# Patient Record
Sex: MALE | Race: BLACK OR AFRICAN AMERICAN | NOT HISPANIC OR LATINO | Employment: UNEMPLOYED | URBAN - METROPOLITAN AREA
[De-identification: names, ages, dates, MRNs, and addresses within clinical notes are randomized per-mention and may not be internally consistent; named-entity substitution may affect disease eponyms.]

---

## 2023-10-31 PROBLEM — R14.0 ABDOMINAL BLOATING: Status: ACTIVE | Noted: 2023-10-31

## 2023-10-31 PROBLEM — N52.9 MALE ERECTILE DISORDER OF ORGANIC ORIGIN: Status: ACTIVE | Noted: 2023-10-31

## 2023-11-01 ENCOUNTER — CLINICAL SUPPORT (OUTPATIENT)
Dept: ORTHOPEDIC SURGERY | Facility: CLINIC | Age: 53
End: 2023-11-01
Payer: COMMERCIAL

## 2023-11-01 ENCOUNTER — OFFICE VISIT (OUTPATIENT)
Dept: ORTHOPEDIC SURGERY | Facility: CLINIC | Age: 53
End: 2023-11-01
Payer: COMMERCIAL

## 2023-11-01 VITALS — BODY MASS INDEX: 34.16 KG/M2 | WEIGHT: 205 LBS | HEIGHT: 65 IN

## 2023-11-01 DIAGNOSIS — M25.552 BILATERAL HIP PAIN: ICD-10-CM

## 2023-11-01 DIAGNOSIS — M16.0 PRIMARY OSTEOARTHRITIS OF BOTH HIPS: Primary | ICD-10-CM

## 2023-11-01 DIAGNOSIS — M25.551 BILATERAL HIP PAIN: ICD-10-CM

## 2023-11-01 PROCEDURE — 73521 X-RAY EXAM HIPS BI 2 VIEWS: CPT | Mod: BILATERAL PROCEDURE | Performed by: STUDENT IN AN ORGANIZED HEALTH CARE EDUCATION/TRAINING PROGRAM

## 2023-11-01 PROCEDURE — 99204 OFFICE O/P NEW MOD 45 MIN: CPT | Performed by: STUDENT IN AN ORGANIZED HEALTH CARE EDUCATION/TRAINING PROGRAM

## 2023-11-01 RX ORDER — NAPROXEN 500 MG/1
500 TABLET ORAL 2 TIMES DAILY
Qty: 60 TABLET | Refills: 1 | Status: SHIPPED | OUTPATIENT
Start: 2023-11-01 | End: 2023-12-31

## 2023-11-01 ASSESSMENT — PAIN SCALES - GENERAL: PAINLEVEL_OUTOF10: 7

## 2023-11-01 ASSESSMENT — PAIN - FUNCTIONAL ASSESSMENT: PAIN_FUNCTIONAL_ASSESSMENT: 0-10

## 2023-11-01 NOTE — PROGRESS NOTES
History of Present Illness  Chief Complaint   Patient presents with    Right Hip - New Patient Visit     1. Bilateral Hip pain x 3 months   DOI- Fell in bather tub about a year ago   X-Rays today        Patient presents with side: bilateral hip pain for one year.  It has been worsening over the past  6 months.  The patient localizes the pain predominantly in the groin.  Recently there has been concern for falls and instability.  There is increasing difficulty with activities of daily living and significant disability related to the hip pain. He denies any past treatment methods for this.   Pain is described as aching.  Better with rest, worse with activity.   Numbness or tingling radiating to the lower extremities: No    History reviewed. No pertinent past medical history.    Medication Documentation Review Audit       Reviewed by Teresa Mcarthur MA (Medical Assistant) on 11/01/23 at 1429      Medication Order Taking? Sig Documenting Provider Last Dose Status            No Medications to Display                                   No Known Allergies    History reviewed. No pertinent surgical history.    No images are attached to the encounter.       Review of Systems   GENERAL: Negative for malaise, significant weight loss, fever  MUSCULOSKELETAL: see HPI  NEURO:  Negative     Exam  side: bilateral Hip:  Antalgic gait  Negative Trendelenburg sign  Skin healthy and intact  No tenderness to palpation over lumbar spine  Minimal tenderness over greater trochanter  Range of motion:  Flexion: 110 internal rotation: 40 external rotation: 30   Pain with: activity, climbing stairs, walking, and putting on shoes and socks  No weakness with resisted hip flexion, abduction or adduction  Negative straight leg raise  Neurovascular exam normal distally     Radiographs  XR hips bilateral 2 VW w or wo pelvis    Result Date: 11/1/2023  Interpreted By:  Vaughn Adler III, STUDY: XR HIPS BILATERAL 2 VW WITH OR WITHOUT PELVIS; ;   11/1/2023 2:44 pm   INDICATION: Signs/Symptoms:pain.   COMPARISON: None.   ACCESSION NUMBER(S): [id number]   ORDERING CLINICIAN: JAKE GRIGGS   FINDINGS: Bilateral hip x-rays: Severe bilateral joint space narrowing about the hip articulations there is osteophyte formation sclerosis subchondral cystic change consistent with severe hip arthritis.       Bilateral severe hip arthritis     MACRO: None   Signed by: Jake Griggs III 11/1/2023 3:10 PM Dictation workstation:   [id number]            Assessment  52-year-old male with severe Osteoarthrosis side: bilateral hip     Plan  We discussed with the patient the diagnosis of severe degenerative joint disease of the hip.  We reviewed an evidence-based approach to osteoarthritis of the hip.  We strongly encouraged low-impact aerobic activity and non-opioid analgesics.  We discussed the role of physical therapy to aid in strengthening and gait training.  We discussed temporary pain relief with corticosteroid injections and the associated risks.      The patient elected for oral Naproxen therapy    We discussed the use of nonsteroidal anti-inflammatory drugs as part of a treatment plan. We discussed that these may result in GI upset and/or bleeding. Any stomach pain or dark hard stools would be reason to discontinue use. We discussed that when used over the long-term these medications can result in altered renal or hepatic function, and that routine use beyond 3 months requires follow-up with their primary provider for monitoring.  They expressed their understanding and agree with the plan.        In a face to face encounter, I evaluated the patient and performed a physical examination, discussed pertinent diagnostic studies if indicated and discussed diagnosis and management strategies with both the patient and physician assistant / nurse practitioner.  I reviewed the PA/NP's note and agree with the documented findings and plan of care.     In summary 52-year-old male  with bilateral severe hip arthritis.  He has not attempted any forms of conservative treatment at this point time.  We will proceed with oral anti-inflammatory for the next 6 weeks and see if this provides him any relief.  Discussed that ultimate treatment would be a total hip replacement.  At next visit we will further discuss injection therapy versus hip replacement.  He was provided with a handout regarding a total hip replacement.    Vaughn Adler III, MD

## 2023-12-15 ENCOUNTER — OFFICE VISIT (OUTPATIENT)
Dept: ORTHOPEDIC SURGERY | Facility: CLINIC | Age: 53
End: 2023-12-15
Payer: COMMERCIAL

## 2023-12-15 DIAGNOSIS — M25.552 BILATERAL HIP PAIN: Primary | ICD-10-CM

## 2023-12-15 DIAGNOSIS — M25.551 BILATERAL HIP PAIN: Primary | ICD-10-CM

## 2023-12-15 PROCEDURE — 99213 OFFICE O/P EST LOW 20 MIN: CPT | Performed by: STUDENT IN AN ORGANIZED HEALTH CARE EDUCATION/TRAINING PROGRAM

## 2023-12-15 NOTE — PROGRESS NOTES
History of Present Illness  Patient presents with bilateral groin pain for several years.  The patient localizes the pain predominantly anterior.  Recently there has been concern for falls and instability.  There is increasing difficulty with activities of daily living and significant disability related to the hip pain.  The patient endorses the following failed non-operative treatments: Naproxen.   There is increasing frustration with persistent pain and discomfor and decreasing distance of ambulation.  Pain is moderate, achy, diffuse.  Better with rest, worse with activity.      Naproxen provided him short course of relief.  Of note though he does have upcoming dental surgery to remove some rotten teeth.  This is scheduled for 1/11/2024 patient is tired with the way he is feeling wishes to proceed likely with total hip r arthroplasty sometime this spring     Review of Systems   GENERAL: Negative for malaise, significant weight loss, fever  MUSCULOSKELETAL: see HPI  NEURO:  Negative     Exam  Bilateral hip:  Antalgic gait  Negative Trendelenburg sign  Skin healthy and intact  No tenderness to palpation over lumbar spine  Minimal tenderness over greater trochanter     Mild pain with extreme of forward flexion   Rotation: Moderate discomfort  No weakness with resisted hip flexion, abduction or adduction     Positive flexion/adduction/internal rotation  Negative flexion/abduction/external rotation test  Negative straight leg raise  Neurovascular exam normal distally     Radiographs  No results found.       Assessment  Patient with severe bilateral hip osteoarthritis     Plan  We discussed with the patient the diagnosis of severe bilateral degenerative joint disease of the hip.  We reviewed an evidence-based approach to osteoarthritis of the hip.  We strongly encouraged low-impact aerobic activity and non-opioid analgesics.     We discussed temporary pain relief with corticosteroid injections and the associated  risks.      The patient elected for continue use of naproxen will give him morphine refill for this today  Plan at this point in time is likely to proceed with total hip arthroplasty sometime this spring.  Discussed with patient that we need to have this dental work done and completely resolved prior to discussion of any type of total hip replacement.  Patient will return later January after this was addressed..

## 2024-01-24 ENCOUNTER — TELEPHONE (OUTPATIENT)
Dept: ORTHOPEDIC SURGERY | Facility: CLINIC | Age: 54
End: 2024-01-24
Payer: COMMERCIAL

## 2024-01-24 DIAGNOSIS — M16.0 PRIMARY OSTEOARTHRITIS OF BOTH HIPS: Primary | ICD-10-CM

## 2024-01-24 RX ORDER — NAPROXEN 500 MG/1
500 TABLET ORAL 2 TIMES DAILY
Qty: 60 TABLET | Refills: 1 | Status: SHIPPED | OUTPATIENT
Start: 2024-01-24 | End: 2024-03-29 | Stop reason: HOSPADM

## 2024-01-31 ENCOUNTER — OFFICE VISIT (OUTPATIENT)
Dept: ORTHOPEDIC SURGERY | Facility: CLINIC | Age: 54
End: 2024-01-31
Payer: COMMERCIAL

## 2024-01-31 VITALS — HEIGHT: 65 IN | BODY MASS INDEX: 33.32 KG/M2 | WEIGHT: 200 LBS

## 2024-01-31 DIAGNOSIS — M16.11 PRIMARY OSTEOARTHRITIS OF RIGHT HIP: Primary | ICD-10-CM

## 2024-01-31 DIAGNOSIS — M25.551 BILATERAL HIP PAIN: ICD-10-CM

## 2024-01-31 DIAGNOSIS — M25.552 BILATERAL HIP PAIN: ICD-10-CM

## 2024-01-31 PROCEDURE — 99214 OFFICE O/P EST MOD 30 MIN: CPT | Performed by: STUDENT IN AN ORGANIZED HEALTH CARE EDUCATION/TRAINING PROGRAM

## 2024-01-31 PROCEDURE — 4004F PT TOBACCO SCREEN RCVD TLK: CPT | Performed by: STUDENT IN AN ORGANIZED HEALTH CARE EDUCATION/TRAINING PROGRAM

## 2024-01-31 PROCEDURE — E0143 WALKER FOLDING WHEELED W/O S: HCPCS | Performed by: STUDENT IN AN ORGANIZED HEALTH CARE EDUCATION/TRAINING PROGRAM

## 2024-01-31 ASSESSMENT — PAIN SCALES - GENERAL: PAINLEVEL_OUTOF10: 8

## 2024-01-31 ASSESSMENT — PAIN - FUNCTIONAL ASSESSMENT: PAIN_FUNCTIONAL_ASSESSMENT: 0-10

## 2024-02-01 NOTE — PROGRESS NOTES
Chief Complaint   Patient presents with    Left Hip - Pain     B/L HIP OA  X-RAY TODAY    Right Hip - Follow-up, Pain       The patient is here for follow-up of their hip arthritis.  They complain of severe hip pain.  Thus far the patient has tried Rest, ice, elevation, Tylenol, NSAIDS, Injections, and physician directed exercises, exercise modifications .  The patient denies any recent trauma.  The patient denies any back pain, numbness or tingling in the lower extremity.  States that he has debilitating pain about the hip right.    History reviewed. No pertinent past medical history.    Medication Documentation Review Audit       Reviewed by Teresa Mcarthur MA (Medical Assistant) on 01/31/24 at 1532      Medication Order Taking? Sig Documenting Provider Last Dose Status   naproxen (Naprosyn) 500 mg tablet [id number]  Take 1 tablet (500 mg) by mouth 2 times a day. Vaughn Adler MD  Active                    No Known Allergies    Social History     Socioeconomic History    Marital status:      Spouse name: Not on file    Number of children: Not on file    Years of education: Not on file    Highest education level: Not on file   Occupational History    Not on file   Tobacco Use    Smoking status: Every Day     Packs/day: 1     Types: Cigarettes    Smokeless tobacco: Never   Substance and Sexual Activity    Alcohol use: Not on file    Drug use: Not on file    Sexual activity: Not on file   Other Topics Concern    Not on file   Social History Narrative    Not on file     Social Determinants of Health     Financial Resource Strain: Not on file   Food Insecurity: Not on file   Transportation Needs: Not on file   Physical Activity: Not on file   Stress: Not on file   Social Connections: Not on file   Intimate Partner Violence: Not on file   Housing Stability: Not on file       History reviewed. No pertinent surgical history.    Body mass index is 33.28 kg/m².    Physical examination  Right hip:  Antalgic  gait  Negative Trendelenburg sign  Skin healthy and intact  No tenderness to palpation over lumbar spine  Minimal tenderness over greater trochanter     Mild pain with extreme of forward flexion   Rotation: Pain with internal and external rotation  No weakness with resisted hip flexion, abduction or adduction     Positive flexion/adduction/internal rotation  Negative flexion/abduction/external rotation test  Negative straight leg raise  Neurovascular exam normal distally    Imaging:  XR hips bilateral 2 VW w or wo pelvis  Narrative: Interpreted By:  Jake Griggs III,   STUDY:  XR HIPS BILATERAL 2 VW WITH OR WITHOUT PELVIS; ;  11/1/2023 2:44 pm      INDICATION:  Signs/Symptoms:pain.      COMPARISON:  None.      ACCESSION NUMBER(S):  [id number]      ORDERING CLINICIAN:  JAKE GRIGGS      FINDINGS:  Bilateral hip x-rays: Severe bilateral joint space narrowing about  the hip articulations there is osteophyte formation sclerosis  subchondral cystic change consistent with severe hip arthritis.      Impression: Bilateral severe hip arthritis          MACRO:  None      Signed by: Jake Griggs III 11/1/2023 3:10 PM  Dictation workstation:   [id number]      Impression:  3-year-old male with bilateral hip arthritis right greater than left    Plan:  X-ray findings again discussed with patient in detail  Discussed importance of low impact aerobic type exercise  Discussed importance of nutrition and healthy diet to help offload the hip joint  Discussed activities to avoid  Patient wishes to proceed with total hip arthroplasty    History and physical exam as well as imaging findings discussed with patient in detail.  Patient has longstanding hip pain that has failed to improve with conservative treatment to include injections, activity modifications and anti-inflammatories.  Risk benefits and alternatives of treatment were discussed with the patient in detail.  Using shared informed decision making patient wishes to  proceed with surgical intervention to include right total hip arthroplasty with Dell assist.  Risks of the surgery which include but are not limited to infection, need for revision surgery, neurovascular compromise, instability, infection, periprosthetic fracture, continued pain and stiffness were discussed with this patient in detail.  They do wish to proceed.      We will obtain medical clearance prior to proceeding.  We will also obtain a CT scan for preoperative planning.  Patient will return to clinic after these are completed for presurgical visit and further discussion of details regarding surgery and day of surgery.    I have personally reviewed the OARRS report for this patient. This report is scanned into the electronic medical record. I have considered the risks of abuse, dependence, addiction, and diversion. They currently report a pain of 8.     The risks of surgery were discussed including but not limited to the risks of medications given for surgery, the risk of blood loss during and after surgery that can lead to the need for blood products in certain situations, infection, damage to normal structures that can lead to long term problems of pain or dysfunction, wound healing complications, the possibility of nonunion/malunion of any osteotomies and late or chronic pain as a result of the surgical intervention.  In addition potentially life threatening complications that can occur at the time of surgery and after surgery were discussed including but not limited to deep vein thrombosis, pulmonary embolism, myocardial infarction, stroke and death.

## 2024-02-28 ENCOUNTER — TELEPHONE (OUTPATIENT)
Dept: ORTHOPEDIC SURGERY | Facility: CLINIC | Age: 54
End: 2024-02-28
Payer: COMMERCIAL

## 2024-02-28 DIAGNOSIS — M16.0 PRIMARY OSTEOARTHRITIS OF BOTH HIPS: ICD-10-CM

## 2024-02-28 NOTE — TELEPHONE ENCOUNTER
Spoke with Dr. Adler, he said we can send in a steroid back, but he hold off on any narcotic medications for after surgery.  He also said we can give arlin  placard for 3 months.    Sent [employer] message to patient notifying of this.

## 2024-03-01 ENCOUNTER — TELEPHONE (OUTPATIENT)
Dept: ORTHOPEDIC SURGERY | Facility: CLINIC | Age: 54
End: 2024-03-01
Payer: COMMERCIAL

## 2024-03-01 PROBLEM — M16.11 UNILATERAL PRIMARY OSTEOARTHRITIS, RIGHT HIP: Status: ACTIVE | Noted: 2024-03-28

## 2024-03-01 RX ORDER — METHYLPREDNISOLONE 4 MG/1
TABLET ORAL
Qty: 21 TABLET | Refills: 0 | Status: SHIPPED | OUTPATIENT
Start: 2024-03-01 | End: 2024-03-14 | Stop reason: ENTERED-IN-ERROR

## 2024-03-01 NOTE — TELEPHONE ENCOUNTER
----- Message from Teresita Krause sent at 2/29/2024  6:06 AM EST -----  Regarding: RE: AXEL III surgery  Prior Authorization: APPROVED     Insurance Provider: [facility]     Test-Procedure-Medication-Equipment: CT TRISTAN RT HIP WO CONTRAST    Name of Insurance Personnel conferred with: ONLINE       Prior Authorization Number: [id number]        Expiration Date: 4/13/24     Comments: SCHEDULE PT AT [facility]    ----- Message -----  From: Laury Phillip  Sent: 2/28/2024  10:38 AM EST  To: Teresita Krause  Subject: AXEL III surgery                                   AXEL PT  CT SCAN   RIGHT HIP  SX 03/28/2024 AT [facility]

## 2024-03-04 ENCOUNTER — TRANSCRIBE ORDERS (OUTPATIENT)
Dept: ORTHOPEDIC SURGERY | Facility: CLINIC | Age: 54
End: 2024-03-04
Payer: COMMERCIAL

## 2024-03-04 ENCOUNTER — TELEPHONE (OUTPATIENT)
Dept: ORTHOPEDIC SURGERY | Facility: CLINIC | Age: 54
End: 2024-03-04
Payer: COMMERCIAL

## 2024-03-04 DIAGNOSIS — M16.11 PRIMARY OSTEOARTHRITIS OF RIGHT HIP: ICD-10-CM

## 2024-03-04 NOTE — TELEPHONE ENCOUNTER
CALLED AND MADE APPT FOR 03/14/2024 AT 8:15AM, CALLED PT AND MADE HIM AWARE OF THE APPT. SO THAT THIS GOES ALONG WITH PAT APPT     ----- Message from Teresita Krause sent at 2/29/2024  6:06 AM EST -----  Regarding: RE: AXEL III surgery  Prior Authorization: APPROVED     Insurance Provider: [facility]     Test-Procedure-Medication-Equipment: CT TRISTAN RT HIP WO CONTRAST    Name of Insurance Personnel conferred with: ONLINE       Prior Authorization Number: [id number]        Expiration Date: 4/13/24     Comments: SCHEDULE PT AT [facility]    ----- Message -----  From: Laury Phillip  Sent: 2/28/2024  10:38 AM EST  To: Teresita Krause  Subject: AXEL III surgery                                   AXEL PT  CT SCAN   RIGHT HIP  SX 03/28/2024 AT [facility]

## 2024-03-08 DIAGNOSIS — M16.0 PRIMARY OSTEOARTHRITIS OF BOTH HIPS: ICD-10-CM

## 2024-03-12 ENCOUNTER — OFFICE VISIT (OUTPATIENT)
Dept: PRIMARY CARE | Facility: CLINIC | Age: 54
End: 2024-03-12
Payer: COMMERCIAL

## 2024-03-12 VITALS
HEART RATE: 76 BPM | SYSTOLIC BLOOD PRESSURE: 134 MMHG | BODY MASS INDEX: 33.32 KG/M2 | TEMPERATURE: 98 F | HEIGHT: 65 IN | DIASTOLIC BLOOD PRESSURE: 80 MMHG | WEIGHT: 200 LBS

## 2024-03-12 DIAGNOSIS — F17.200 SMOKER: ICD-10-CM

## 2024-03-12 DIAGNOSIS — M16.11 UNILATERAL PRIMARY OSTEOARTHRITIS, RIGHT HIP: ICD-10-CM

## 2024-03-12 DIAGNOSIS — Z01.818 PREOP EXAMINATION: Primary | ICD-10-CM

## 2024-03-12 PROCEDURE — 99214 OFFICE O/P EST MOD 30 MIN: CPT | Performed by: INTERNAL MEDICINE

## 2024-03-12 ASSESSMENT — ENCOUNTER SYMPTOMS
CONSTITUTIONAL NEGATIVE: 1
HEMATOLOGIC/LYMPHATIC NEGATIVE: 1
EYES NEGATIVE: 1
PSYCHIATRIC NEGATIVE: 1
NEUROLOGICAL NEGATIVE: 1
RESPIRATORY NEGATIVE: 1
ALLERGIC/IMMUNOLOGIC NEGATIVE: 1
GASTROINTESTINAL NEGATIVE: 1
CARDIOVASCULAR NEGATIVE: 1

## 2024-03-12 NOTE — H&P (VIEW-ONLY)
Subjective   Patient ID: Farhat Julian is a 53 y.o. male who presents for No chief complaint on file..    HPI patient has not been here for a while now see here for preop clearance for his right hip he is limping and is in a lot of pain he never had any general anesthesia in the past he does not have diabetes CHF he does smoke half pack of cigarettes daily    Review of Systems   Constitutional: Negative.    HENT: Negative.     Eyes: Negative.    Respiratory: Negative.     Cardiovascular: Negative.    Gastrointestinal: Negative.    Genitourinary: Negative.    Musculoskeletal:         Rt hip arthritis and left hip arthritis   Skin: Negative.    Allergic/Immunologic: Negative.    Neurological: Negative.    Hematological: Negative.    Psychiatric/Behavioral: Negative.     All other systems reviewed and are negative.      Objective   There were no vitals taken for this visit.    Physical Exam  Vitals reviewed.   Constitutional:       Comments: Limping with the right leg and moderate pain   HENT:      Head: Normocephalic.      Nose: Nose normal.   Cardiovascular:      Rate and Rhythm: Normal rate and regular rhythm.   Pulmonary:      Effort: Pulmonary effort is normal.      Breath sounds: Normal breath sounds.   Musculoskeletal:      Right lower leg: No edema.      Left lower leg: No edema.      Comments: Limping with the right leg   Neurological:      General: No focal deficit present.      Mental Status: He is alert. Mental status is at baseline.   Psychiatric:         Mood and Affect: Mood normal.         Thought Content: Thought content normal.         Assessment/Plan   Problem List Items Addressed This Visit             ICD-10-CM    Unilateral primary osteoarthritis, right hip M16.11    Smoker F17.200     Other Visit Diagnoses         Codes    Preop examination    -  Primary Z01.818        Patient medically cleared for hip replacement to go for PAD on 14th.  Note regarding clearance will be shared with Dr. Adler  the requesting orthopedics via EMR.  Patient advised to quit smoking.  Also advised strongly to come back for wellness exam and advised about getting a screening colonoscopy done and LDCT for cancer screening.

## 2024-03-13 RX ORDER — ACETAMINOPHEN 500 MG
500 TABLET ORAL EVERY 6 HOURS PRN
COMMUNITY
End: 2024-03-14 | Stop reason: ENTERED-IN-ERROR

## 2024-03-13 ASSESSMENT — DUKE ACTIVITY SCORE INDEX (DASI)
CAN YOU DO MODERATE WORK AROUND THE HOUSE LIKE VACUUMING, SWEEPING FLOORS OR CARRYING GROCERIES: YES
CAN YOU WALK INDOORS, SUCH AS AROUND YOUR HOUSE: YES
CAN YOU PARTICIPATE IN MODERATE RECREATIONAL ACTIVITIES LIKE GOLF, BOWLING, DANCING, DOUBLES TENNIS OR THROWING A BASEBALL OR FOOTBALL: NO
CAN YOU DO HEAVY WORK AROUND THE HOUSE LIKE SCRUBBING FLOORS OR LIFTING AND MOVING HEAVY FURNITURE: NO
CAN YOU HAVE SEXUAL RELATIONS: YES
DASI METS SCORE: 5.7
CAN YOU PARTICIPATE IN STRENOUS SPORTS LIKE SWIMMING, SINGLES TENNIS, FOOTBALL, BASKETBALL, OR SKIING: NO
CAN YOU WALK A BLOCK OR TWO ON LEVEL GROUND: YES
CAN YOU RUN A SHORT DISTANCE: NO
TOTAL_SCORE: 24.2
CAN YOU CLIMB A FLIGHT OF STAIRS OR WALK UP A HILL: YES
CAN YOU DO LIGHT WORK AROUND THE HOUSE LIKE DUSTING OR WASHING DISHES: YES
CAN YOU DO YARD WORK LIKE RAKING LEAVES, WEEDING OR PUSHING A MOWER: NO
CAN YOU TAKE CARE OF YOURSELF (EAT, DRESS, BATHE, OR USE TOILET): YES

## 2024-03-13 ASSESSMENT — CHADS2 SCORE
PRIOR STROKE OR TIA OR THROMBOEMBOLISM: NO
CHF: NO
AGE GREATER THAN OR EQUAL TO 75: NO
DIABETES: NO
CHADS2 SCORE: 0
HYPERTENSION: NO

## 2024-03-13 ASSESSMENT — LIFESTYLE VARIABLES: SMOKING_STATUS: SMOKER

## 2024-03-13 ASSESSMENT — ACTIVITIES OF DAILY LIVING (ADL): ADL_SCORE: 1

## 2024-03-14 ENCOUNTER — PRE-ADMISSION TESTING (OUTPATIENT)
Dept: PREADMISSION TESTING | Facility: HOSPITAL | Age: 54
End: 2024-03-14
Payer: COMMERCIAL

## 2024-03-14 ENCOUNTER — APPOINTMENT (OUTPATIENT)
Dept: RADIOLOGY | Facility: HOSPITAL | Age: 54
End: 2024-03-14
Payer: COMMERCIAL

## 2024-03-14 ENCOUNTER — LAB (OUTPATIENT)
Dept: LAB | Facility: LAB | Age: 54
End: 2024-03-14
Payer: COMMERCIAL

## 2024-03-14 VITALS
WEIGHT: 198.85 LBS | BODY MASS INDEX: 33.13 KG/M2 | HEIGHT: 65 IN | OXYGEN SATURATION: 96 % | SYSTOLIC BLOOD PRESSURE: 173 MMHG | RESPIRATION RATE: 16 BRPM | DIASTOLIC BLOOD PRESSURE: 97 MMHG | HEART RATE: 85 BPM | TEMPERATURE: 98.2 F

## 2024-03-14 DIAGNOSIS — Z01.818 PRE-OP EXAMINATION: ICD-10-CM

## 2024-03-14 DIAGNOSIS — Z01.818 PRE-OP TESTING: Primary | ICD-10-CM

## 2024-03-14 DIAGNOSIS — Z01.818 PRE-OP TESTING: ICD-10-CM

## 2024-03-14 LAB
ABO GROUP (TYPE) IN BLOOD: NORMAL
ALBUMIN SERPL BCP-MCNC: 4.1 G/DL (ref 3.4–5)
ALP SERPL-CCNC: 82 U/L (ref 33–120)
ALT SERPL W P-5'-P-CCNC: 54 U/L (ref 10–52)
ANION GAP SERPL CALC-SCNC: 12 MMOL/L (ref 10–20)
ANTIBODY SCREEN: NORMAL
APTT PPP: 32 SECONDS (ref 27–38)
AST SERPL W P-5'-P-CCNC: 33 U/L (ref 9–39)
ATRIAL RATE: 79 BPM
BASOPHILS # BLD AUTO: 0.1 X10*3/UL (ref 0–0.1)
BASOPHILS NFR BLD AUTO: 1.3 %
BILIRUB SERPL-MCNC: 0.5 MG/DL (ref 0–1.2)
BUN SERPL-MCNC: 12 MG/DL (ref 6–23)
CALCIUM SERPL-MCNC: 9.3 MG/DL (ref 8.6–10.3)
CHLORIDE SERPL-SCNC: 108 MMOL/L (ref 98–107)
CO2 SERPL-SCNC: 26 MMOL/L (ref 21–32)
CREAT SERPL-MCNC: 0.85 MG/DL (ref 0.5–1.3)
EGFRCR SERPLBLD CKD-EPI 2021: >90 ML/MIN/1.73M*2
EOSINOPHIL # BLD AUTO: 0.38 X10*3/UL (ref 0–0.7)
EOSINOPHIL NFR BLD AUTO: 4.9 %
ERYTHROCYTE [DISTWIDTH] IN BLOOD BY AUTOMATED COUNT: 12 % (ref 11.5–14.5)
EST. AVERAGE GLUCOSE BLD GHB EST-MCNC: 111 MG/DL
GLUCOSE SERPL-MCNC: 114 MG/DL (ref 74–99)
HBA1C MFR BLD: 5.5 %
HCT VFR BLD AUTO: 38 % (ref 41–52)
HGB BLD-MCNC: 13.3 G/DL (ref 13.5–17.5)
IMM GRANULOCYTES # BLD AUTO: 0.02 X10*3/UL (ref 0–0.7)
IMM GRANULOCYTES NFR BLD AUTO: 0.3 % (ref 0–0.9)
INR PPP: 1 (ref 0.9–1.1)
LYMPHOCYTES # BLD AUTO: 1.74 X10*3/UL (ref 1.2–4.8)
LYMPHOCYTES NFR BLD AUTO: 22.4 %
MCH RBC QN AUTO: 32.4 PG (ref 26–34)
MCHC RBC AUTO-ENTMCNC: 35 G/DL (ref 32–36)
MCV RBC AUTO: 93 FL (ref 80–100)
MONOCYTES # BLD AUTO: 0.81 X10*3/UL (ref 0.1–1)
MONOCYTES NFR BLD AUTO: 10.4 %
NEUTROPHILS # BLD AUTO: 4.73 X10*3/UL (ref 1.2–7.7)
NEUTROPHILS NFR BLD AUTO: 60.7 %
NRBC BLD-RTO: 0 /100 WBCS (ref 0–0)
P AXIS: 41 DEGREES
P OFFSET: 199 MS
P ONSET: 142 MS
PLATELET # BLD AUTO: 311 X10*3/UL (ref 150–450)
POTASSIUM SERPL-SCNC: 4.1 MMOL/L (ref 3.5–5.3)
PR INTERVAL: 164 MS
PROT SERPL-MCNC: 7.3 G/DL (ref 6.4–8.2)
PROTHROMBIN TIME: 11.1 SECONDS (ref 9.8–12.8)
Q ONSET: 224 MS
QRS COUNT: 13 BEATS
QRS DURATION: 92 MS
QT INTERVAL: 380 MS
QTC CALCULATION(BAZETT): 435 MS
QTC FREDERICIA: 416 MS
R AXIS: 3 DEGREES
RBC # BLD AUTO: 4.1 X10*6/UL (ref 4.5–5.9)
RH FACTOR (ANTIGEN D): NORMAL
SODIUM SERPL-SCNC: 142 MMOL/L (ref 136–145)
T AXIS: 10 DEGREES
T OFFSET: 414 MS
VENTRICULAR RATE: 79 BPM
WBC # BLD AUTO: 7.8 X10*3/UL (ref 4.4–11.3)

## 2024-03-14 PROCEDURE — 80053 COMPREHEN METABOLIC PANEL: CPT

## 2024-03-14 PROCEDURE — 85610 PROTHROMBIN TIME: CPT

## 2024-03-14 PROCEDURE — 36415 COLL VENOUS BLD VENIPUNCTURE: CPT

## 2024-03-14 PROCEDURE — 85025 COMPLETE CBC W/AUTO DIFF WBC: CPT

## 2024-03-14 PROCEDURE — 87081 CULTURE SCREEN ONLY: CPT | Mod: STJLAB

## 2024-03-14 PROCEDURE — 85730 THROMBOPLASTIN TIME PARTIAL: CPT

## 2024-03-14 PROCEDURE — 83036 HEMOGLOBIN GLYCOSYLATED A1C: CPT

## 2024-03-14 PROCEDURE — 99204 OFFICE O/P NEW MOD 45 MIN: CPT | Performed by: NURSE PRACTITIONER

## 2024-03-14 PROCEDURE — 86901 BLOOD TYPING SEROLOGIC RH(D): CPT

## 2024-03-14 PROCEDURE — 86900 BLOOD TYPING SEROLOGIC ABO: CPT

## 2024-03-14 PROCEDURE — 86850 RBC ANTIBODY SCREEN: CPT

## 2024-03-14 PROCEDURE — 93005 ELECTROCARDIOGRAM TRACING: CPT

## 2024-03-14 RX ORDER — CHLORHEXIDINE GLUCONATE ORAL RINSE 1.2 MG/ML
SOLUTION DENTAL
Qty: 473 ML | Refills: 0 | Status: SHIPPED | OUTPATIENT
Start: 2024-03-14 | End: 2024-03-29 | Stop reason: HOSPADM

## 2024-03-14 RX ORDER — BISMUTH SUBSALICYLATE 262 MG
1 TABLET,CHEWABLE ORAL DAILY
COMMUNITY

## 2024-03-14 NOTE — PREPROCEDURE INSTRUCTIONS
Medication List            Accurate as of March 14, 2024 10:44 AM. Always use your most recent med list.                chlorhexidine 0.12 % solution  Commonly known as: Peridex  Swish and spit 15 ml for 2 doses, 15mL the night before surgery and 15 ml morning of surgery - swish for 30 seconds -DO NOT SWALLOW, SPIT OUT  Medication Adjustments for Surgery: Other (Comment)  Notes to patient: As indicated     [facility] ORAL  Medication Adjustments for Surgery: Stop 7 days before surgery     multivitamin tablet  Medication Adjustments for Surgery: Stop 7 days before surgery     naproxen 500 mg tablet  Commonly known as: Naprosyn  Take 1 tablet (500 mg) by mouth 2 times a day.  Medication Adjustments for Surgery: Stop 7 days before surgery                       PRE-OPERATIVE INSTRUCTIONS    You will receive notification one business day prior to your procedure to confirm your arrival time. It is important that you answer your phone and/or check your messages during this time. If you do not hear from the surgery center by 5 pm. the day before your procedure, please call [phone].     Please enter the building through the Outpatient entrance and take the elevator off the lobby to the 2nd floor then check in at the Outpatient Surgery desk on the 2nd floor.    INSTRUCTIONS:  Talk to your surgeon for instructions if you should stop your aspirin, blood thinner, or diabetes medicines.  DO NOT take any multivitamins or over the counter supplements for 7-10 days before surgery.  If not being admitted, you must have an adult immediately available to drive you home after surgery. We also highly recommend you have someone stay with you for the entire day and night of your surgery.  For children having surgery, a parent or legal guardian must accompany them to the surgery center. If this is not possible, please call [phone] to make additional arrangements.  For adults who are unable to consent or make  medical decisions for themselves, a legal guardian or Power of  must accompany them to the surgery center. If this is not possible, please call [phone] to make additional arrangements.  Wear comfortable, loose fitting clothing.  All jewelry and piercings must be removed. If you are unable to remove an item or have a dermal piercing, please be sure to tell the nurse when you arrive for surgery.  Nail polish and make-up must be removed.  Avoid smoking or consuming alcohol for 24 hours before surgery.  To help prevent infection, please take a shower/bath and wash your hair the night before and/or morning of surgery (or follow other specific bathing instructions provided).    Preoperative Fasting Guidelines    Why must I stop eating and drinking near surgery time?  With sedation, food or liquid in your stomach can enter your lungs causing serious complications  Increases nausea and vomiting    When do I need to stop eating and drinking before my surgery?  Do not eat any solid food after midnight the night before your surgery/procedure.  You may have up to TEN ounces of clear liquid until TWO hours before your instructed arrival time to the hospital.  This includes water, black tea/coffee, (no milk or cream) apple juice, and electrolyte drinks (Gatorade).   You may chew gum until TWO hours before your surgery/procedure    If you have any questions or concerns, please call Pre-Admission Testing at [phone].

## 2024-03-14 NOTE — CPM/PAT H&P
CPM/PAT Evaluation       Name: Farhat Julian (Farhat Julian)  /Age: 1970/53 y.o.     In-Person       Chief Complaint: right hip pains    HPI    PARADISE is a 54 yo male who has been having bilateral hip pains for few years now and right feels worse than left. Imaging has shown bilateral OA- subsequently scheduled for right THR first, left may follow in the future. Denies any recent steroids- hd did note take recent oral medrol dose pack. Skin intact to surgical limb. No recent falls either. Otherwise denies any recent illness, fever/chills, chest pains or shortness of breath.     Past Medical History:   Diagnosis Date    Arthritis     Dental disease     Joint pain     OA (osteoarthritis)     Smoker     Smoker      PCP: Dr. Miller (med clearance on 3/12/2024)    Past Surgical History:   Procedure Laterality Date    WISDOM TOOTH EXTRACTION         Patient  reports being sexually active.    Family History   Problem Relation Name Age of Onset    Heart failure Mother      Cancer Sibling         No Known Allergies    Prior to Admission medications    Medication Sig Start Date End Date Taking? Authorizing Provider   naproxen (Naprosyn) 500 mg tablet Take 1 tablet (500 mg) by mouth 2 times a day. 1/24/24 3/24/24 Yes Vaughn Adler MD   acetaminophen (Tylenol) 500 mg tablet Take 1 tablet (500 mg) by mouth every 6 hours if needed for mild pain (1 - 3).    Historical Provider, MD   chlorhexidine (Peridex) 0.12 % solution Swish and spit 15 ml for 2 doses, 15mL the night before surgery and 15 ml morning of surgery - swish for 30 seconds -DO NOT SWALLOW, SPIT OUT 3/14/24   SAKSHI Bermudez-CNP   methylPREDNISolone (Medrol Dospak) 4 mg tablets Use as directed by package instructions 3/1/24   Vaughn Adler MD        PAT ROS   Constitutional: Negative for fever, chills, or sweats     ENMT: Negative for nasal discharge, congestion, ear pain, mouth pain, throat pain; positive for recent gum laser procedure- denies  pain of drainage     Respiratory: Negative for cough, wheezing, shortness of breath   Cardiac: Negative for chest pain, dyspnea on exertion, palpitations   Gastrointestinal: Negative for nausea, vomiting, diarrhea, constipation, abdominal pain  Genitourinary: Negative for dysuria, flank pain, frequency, hematuria   Musculoskeletal: Positive for decreased ROM, pain, weakness in bilateral hips- soreness in right groin    Neurological: Negative for dizziness, confusion, headache  Psychiatric: Negative for mood changes   Skin: Negative for itching, rash, ulcer    Hematologic/Lymph: Negative for bruising, easy bleeding  Allergic/Immunologic: Negative itching, sneezing, swelling      Physical Exam  HENT:      Head: Normocephalic.      Mouth/Throat:      Mouth: Mucous membranes are moist.   Eyes:      Extraocular Movements: Extraocular movements intact.   Cardiovascular:      Rate and Rhythm: Normal rate and regular rhythm.   Pulmonary:      Effort: Pulmonary effort is normal.      Breath sounds: Normal breath sounds.   Abdominal:      General: Abdomen is flat.      Palpations: Abdomen is soft.   Musculoskeletal:      Cervical back: Normal range of motion.      Right hip: Decreased range of motion.      Left hip: Decreased range of motion.   Skin:     General: Skin is warm and dry.   Neurological:      General: No focal deficit present.      Mental Status: He is alert.   Psychiatric:         Mood and Affect: Mood normal.          PAT AIRWAY:   Airway:     Neck ROM::  Full   One missing tooth from extraction and wisdom teeth missing    Anesthesia:  Patient has not been under general anesthesia.    -recent gum laser procedure- no swelling or drainge    Visit Vitals  BP (!) 173/97   Pulse 85   Temp 36.8 °C (98.2 °F) (Temporal)   Resp 16       DASI Risk Score      Flowsheet Row Most Recent Value   DASI SCORE 24.2   METS Score (Will be calculated only when all the questions are answered) 5.7          Caprini DVT Assessment       Flowsheet Row Most Recent Value   DVT Score 11   Current Status Major surgery planned, including arthroscopic and laproscopic (1-2 hours), Elective major lower extremity arthroplasty   Age 40-59 years   BMI 31-40 (Obesity)          Modified Frailty Index      Flowsheet Row Most Recent Value   Modified Frailty Index Calculator 0          CHADS2 Stroke Risk         N/A 3 - 100%: High Risk   2 - 3%: Medium Risk   0 - 2%: Low Risk     Last Change: N/A          This score determines the patient's risk of having a stroke if the patient has atrial fibrillation.        This score is not applicable to this patient. Components are not calculated.          Revised Cardiac Risk Index    No data to display       Apfel Simplified Score    No data to display       Risk Analysis Index Results This Encounter         3/13/2024  1116             AMIN Cancer History: Patient does not indicate history of cancer    Total Risk Analysis Index Score Without Cancer: 18    Total Risk Analysis Index Score: 18          Stop Bang Score      Flowsheet Row Most Recent Value   Do you snore loudly? 1   Do you often feel tired or fatigued after your sleep? 0   Has anyone ever observed you stop breathing in your sleep? 1   Do you have or are you being treated for high blood pressure? 0   Recent BMI (Calculated) 33.3   Is BMI greater than 35 kg/m2? 0=No   Age older than 50 years old? 1=Yes   Is your neck circumference greater than 17 inches (Male) or 16 inches (Female)? 0   Gender - Male 1=Yes   STOP-BANG Total Score 4            Assessment and Plan:     54 yo male scheduled for right total hip replacement on 3/28/2024 with Dr. Adler.  Blood work with MRSA ordered-oral chlorhexidine prescribed. EKG shows normal sinus rhythm with ventricular rate 79 bpm, T wave abnormalities in inferior and anterolateral leads-no comparable EKG on file- admits that he has never had one.  Considering that patient was medically cleared by PCP this EKG will be  forwarded to primary care for review.  Patient is overall healthy and high functioning other than hip pains and  being a smoker- plans to quit in the future and understanding the importance of smoking cessation. Otherwise no further orders indicated.     Medical clearance from Dr. Miller on 3/12/2024 on file- will be sent EKG for review. Patient is asymptomatic of chest pains and shortness of breath.   CHADS2 0  METS 5.7    See risk scores as previously documented.

## 2024-03-15 ENCOUNTER — HOSPITAL ENCOUNTER (OUTPATIENT)
Dept: RADIOLOGY | Facility: HOSPITAL | Age: 54
Discharge: HOME | End: 2024-03-15
Payer: COMMERCIAL

## 2024-03-15 ENCOUNTER — APPOINTMENT (OUTPATIENT)
Dept: RADIOLOGY | Facility: HOSPITAL | Age: 54
End: 2024-03-15
Payer: COMMERCIAL

## 2024-03-15 DIAGNOSIS — M16.11 PRIMARY OSTEOARTHRITIS OF RIGHT HIP: ICD-10-CM

## 2024-03-15 PROCEDURE — 73700 CT LOWER EXTREMITY W/O DYE: CPT | Mod: RT

## 2024-03-16 LAB — STAPHYLOCOCCUS SPEC CULT: NORMAL

## 2024-03-21 ENCOUNTER — TELEPHONE (OUTPATIENT)
Dept: ORTHOPEDIC SURGERY | Facility: CLINIC | Age: 54
End: 2024-03-21
Payer: COMMERCIAL

## 2024-03-21 NOTE — TELEPHONE ENCOUNTER
03/21/24  Spoke w/ pt and he does have a wheeled walker to use following sx.  Instructed to take it with him to hospital so it can be used with PT.

## 2024-03-25 ENCOUNTER — OFFICE VISIT (OUTPATIENT)
Dept: ORTHOPEDIC SURGERY | Facility: CLINIC | Age: 54
End: 2024-03-25
Payer: COMMERCIAL

## 2024-03-25 DIAGNOSIS — M16.11 PRIMARY OSTEOARTHRITIS OF RIGHT HIP: Primary | ICD-10-CM

## 2024-03-25 PROCEDURE — 4004F PT TOBACCO SCREEN RCVD TLK: CPT | Performed by: STUDENT IN AN ORGANIZED HEALTH CARE EDUCATION/TRAINING PROGRAM

## 2024-03-25 PROCEDURE — 99024 POSTOP FOLLOW-UP VISIT: CPT | Performed by: STUDENT IN AN ORGANIZED HEALTH CARE EDUCATION/TRAINING PROGRAM

## 2024-03-26 NOTE — PROGRESS NOTES
This patient has pain in the hip that is increased with activity and weightbearing, the patient walks with an antalgic gait at this time.  The pain is interfering with activities of daily living.  The patient has limited range of motion of the hip and pain with passive range of motion.  This x-ray demonstrates joint space narrowing, subchondral sclerosis, and osteophyte formation.  The patient has failed to respond to conservative treatment with medication therapy and supportive devices for period of at least 3 months.     This is the preop visit to discuss the risks and benefits of the total hip replacement surgery.  These risks were fully explained to the patient.  With this, and as any surgery, infection is a risk.  The risk for infection is usually between 1 and 2%.  This risk is even higher in diabetics, persons with rheumatoid arthritis, patients with previous surgery, patients on oral steroid medication, and obesity.  All of these issues were properly discussed.  We always assure all sterile techniques will be followed during the procedure.  Patient is also need to be on antibiotics for the next 2 years for any minor surgical procedure, even dental work.  For high risk patients this will be for lifetime.  Severe infections may require removal of the prosthesis.     It was also explained to the patient that there will be some blood loss during the procedure.  Transfusions although rare will only be given when absolutely medically necessary.     Pulmonary embolism and blood clots were also discussed with the patient.  We discussed that these can be fatal complications of the procedure.  Is explained to the patient that the use of thromboembolic stockings, foot pumps, incentive spirometer, early mobility, and the use of blood thinners for a period of time is the standard of care.     Dislocations are discussed with the patient.  It is explained that the highest risk for dislocating the hip happens during the  first 3 months after surgery.  These risks tend to decrease with time.  This risk is higher and revisions.  It is explained to the patient that hip precautions are very important and that these will be carried out throughout the lifetime with her prosthesis.  Intraoperatively, we will adjust the length of the leg to tighten the joint to help prevent dislocation.  This leg lengthening to stabilize the joint is usually no more than 1/4 inch but may be more or less to improve stability.     Loosening and wear of the prosthesis is also discussed.  The prosthesis normally lasts between 12 and 15 years on the average.  Revisions may be more complicated.     Fractures, though rare, they also occur intraoperatively.  These fractures may occur in the pelvis or the femur.  There may be nerve or arterial injuries as well and these are discussed in detail.  Lastly the benefits of spinal anesthesia were explained to the patient.     All of the patient's questions and concerns were answered in detail.     This note was prepared using voice recognition software.  The details of this note are correct and have been reviewed, and corrected to the best of my ability.  Some grammatical areas may persist related to the Dragon software

## 2024-03-28 ENCOUNTER — HOSPITAL ENCOUNTER (OUTPATIENT)
Facility: HOSPITAL | Age: 54
Discharge: HOME | End: 2024-03-29
Attending: STUDENT IN AN ORGANIZED HEALTH CARE EDUCATION/TRAINING PROGRAM | Admitting: STUDENT IN AN ORGANIZED HEALTH CARE EDUCATION/TRAINING PROGRAM
Payer: COMMERCIAL

## 2024-03-28 ENCOUNTER — ANESTHESIA EVENT (OUTPATIENT)
Dept: OPERATING ROOM | Facility: HOSPITAL | Age: 54
End: 2024-03-28
Payer: COMMERCIAL

## 2024-03-28 ENCOUNTER — APPOINTMENT (OUTPATIENT)
Dept: RADIOLOGY | Facility: HOSPITAL | Age: 54
End: 2024-03-28
Payer: COMMERCIAL

## 2024-03-28 ENCOUNTER — ANESTHESIA (OUTPATIENT)
Dept: OPERATING ROOM | Facility: HOSPITAL | Age: 54
End: 2024-03-28
Payer: COMMERCIAL

## 2024-03-28 DIAGNOSIS — Z96.641 S/P TOTAL RIGHT HIP ARTHROPLASTY: ICD-10-CM

## 2024-03-28 DIAGNOSIS — M16.0 PRIMARY OSTEOARTHRITIS OF BOTH HIPS: ICD-10-CM

## 2024-03-28 DIAGNOSIS — M16.11 UNILATERAL PRIMARY OSTEOARTHRITIS, RIGHT HIP: Primary | ICD-10-CM

## 2024-03-28 PROCEDURE — 7100000001 HC RECOVERY ROOM TIME - INITIAL BASE CHARGE: Performed by: STUDENT IN AN ORGANIZED HEALTH CARE EDUCATION/TRAINING PROGRAM

## 2024-03-28 PROCEDURE — A27130 PR TOTAL HIP ARTHROPLASTY: Performed by: ANESTHESIOLOGY

## 2024-03-28 PROCEDURE — 27130 TOTAL HIP ARTHROPLASTY: CPT | Performed by: STUDENT IN AN ORGANIZED HEALTH CARE EDUCATION/TRAINING PROGRAM

## 2024-03-28 PROCEDURE — 3700000002 HC GENERAL ANESTHESIA TIME - EACH INCREMENTAL 1 MINUTE: Performed by: STUDENT IN AN ORGANIZED HEALTH CARE EDUCATION/TRAINING PROGRAM

## 2024-03-28 PROCEDURE — A27130 PR TOTAL HIP ARTHROPLASTY: Performed by: ANESTHESIOLOGIST ASSISTANT

## 2024-03-28 PROCEDURE — 2500000002 HC RX 250 W HCPCS SELF ADMINISTERED DRUGS (ALT 637 FOR MEDICARE OP, ALT 636 FOR OP/ED): Performed by: STUDENT IN AN ORGANIZED HEALTH CARE EDUCATION/TRAINING PROGRAM

## 2024-03-28 PROCEDURE — 7100000002 HC RECOVERY ROOM TIME - EACH INCREMENTAL 1 MINUTE: Performed by: STUDENT IN AN ORGANIZED HEALTH CARE EDUCATION/TRAINING PROGRAM

## 2024-03-28 PROCEDURE — 2500000004 HC RX 250 GENERAL PHARMACY W/ HCPCS (ALT 636 FOR OP/ED): Performed by: ANESTHESIOLOGIST ASSISTANT

## 2024-03-28 PROCEDURE — 2500000005 HC RX 250 GENERAL PHARMACY W/O HCPCS: Performed by: STUDENT IN AN ORGANIZED HEALTH CARE EDUCATION/TRAINING PROGRAM

## 2024-03-28 PROCEDURE — 2500000001 HC RX 250 WO HCPCS SELF ADMINISTERED DRUGS (ALT 637 FOR MEDICARE OP): Performed by: STUDENT IN AN ORGANIZED HEALTH CARE EDUCATION/TRAINING PROGRAM

## 2024-03-28 PROCEDURE — 2500000004 HC RX 250 GENERAL PHARMACY W/ HCPCS (ALT 636 FOR OP/ED): Performed by: STUDENT IN AN ORGANIZED HEALTH CARE EDUCATION/TRAINING PROGRAM

## 2024-03-28 PROCEDURE — 3700000001 HC GENERAL ANESTHESIA TIME - INITIAL BASE CHARGE: Performed by: STUDENT IN AN ORGANIZED HEALTH CARE EDUCATION/TRAINING PROGRAM

## 2024-03-28 PROCEDURE — 2780000003 HC OR 278 NO HCPCS: Performed by: STUDENT IN AN ORGANIZED HEALTH CARE EDUCATION/TRAINING PROGRAM

## 2024-03-28 PROCEDURE — 7100000011 HC EXTENDED STAY RECOVERY HOURLY - NURSING UNIT

## 2024-03-28 PROCEDURE — 2720000007 HC OR 272 NO HCPCS: Performed by: STUDENT IN AN ORGANIZED HEALTH CARE EDUCATION/TRAINING PROGRAM

## 2024-03-28 PROCEDURE — A4217 STERILE WATER/SALINE, 500 ML: HCPCS | Performed by: STUDENT IN AN ORGANIZED HEALTH CARE EDUCATION/TRAINING PROGRAM

## 2024-03-28 PROCEDURE — C1776 JOINT DEVICE (IMPLANTABLE): HCPCS | Performed by: STUDENT IN AN ORGANIZED HEALTH CARE EDUCATION/TRAINING PROGRAM

## 2024-03-28 PROCEDURE — 73501 X-RAY EXAM HIP UNI 1 VIEW: CPT | Mod: RT

## 2024-03-28 PROCEDURE — 3600000017 HC OR TIME - EACH INCREMENTAL 1 MINUTE - PROCEDURE LEVEL SIX: Performed by: STUDENT IN AN ORGANIZED HEALTH CARE EDUCATION/TRAINING PROGRAM

## 2024-03-28 PROCEDURE — C1713 ANCHOR/SCREW BN/BN,TIS/BN: HCPCS | Performed by: STUDENT IN AN ORGANIZED HEALTH CARE EDUCATION/TRAINING PROGRAM

## 2024-03-28 PROCEDURE — 3600000018 HC OR TIME - INITIAL BASE CHARGE - PROCEDURE LEVEL SIX: Performed by: STUDENT IN AN ORGANIZED HEALTH CARE EDUCATION/TRAINING PROGRAM

## 2024-03-28 PROCEDURE — 73502 X-RAY EXAM HIP UNI 2-3 VIEWS: CPT | Mod: RIGHT SIDE | Performed by: RADIOLOGY

## 2024-03-28 PROCEDURE — 2500000005 HC RX 250 GENERAL PHARMACY W/O HCPCS: Performed by: ANESTHESIOLOGIST ASSISTANT

## 2024-03-28 DEVICE — 6.5MM LOW PROFILE HEX SCREW 20MM
Type: IMPLANTABLE DEVICE | Site: HIP | Status: FUNCTIONAL
Brand: TRIDENT II

## 2024-03-28 DEVICE — 6.5MM LOW PROFILE HEX SCREW 25MM
Type: IMPLANTABLE DEVICE | Site: HIP | Status: NON-FUNCTIONAL
Brand: TRIDENT II

## 2024-03-28 DEVICE — KNOTLESS TISSUE CONTROL DEVICE, UNDYED UNIDIRECTIONAL (ANTIBACTERIAL) SYNTHETIC ABSORBABLE DEVICE
Type: IMPLANTABLE DEVICE | Site: HIP | Status: NON-FUNCTIONAL
Brand: STRATAFIX

## 2024-03-28 DEVICE — 127 DEGREE NECK ANGLE HIP STEM
Type: IMPLANTABLE DEVICE | Site: HIP | Status: FUNCTIONAL
Brand: ACCOLADE

## 2024-03-28 DEVICE — TRIDENT X3 0 DEGREE POLYETHYLENE INSERT
Type: IMPLANTABLE DEVICE | Site: HIP | Status: FUNCTIONAL
Brand: TRIDENT X3 INSERT

## 2024-03-28 DEVICE — TRIDENT II TRITANIUM CLUSTER 50D
Type: IMPLANTABLE DEVICE | Site: HIP | Status: FUNCTIONAL
Brand: TRIDENT II

## 2024-03-28 DEVICE — CERAMIC V40 FEMORAL HEAD
Type: IMPLANTABLE DEVICE | Site: HIP | Status: FUNCTIONAL
Brand: BIOLOX

## 2024-03-28 RX ORDER — CYCLOBENZAPRINE HCL 10 MG
10 TABLET ORAL 3 TIMES DAILY PRN
Status: DISCONTINUED | OUTPATIENT
Start: 2024-03-28 | End: 2024-03-29 | Stop reason: HOSPADM

## 2024-03-28 RX ORDER — LIDOCAINE HYDROCHLORIDE 20 MG/ML
INJECTION, SOLUTION EPIDURAL; INFILTRATION; INTRACAUDAL; PERINEURAL AS NEEDED
Status: DISCONTINUED | OUTPATIENT
Start: 2024-03-28 | End: 2024-03-28

## 2024-03-28 RX ORDER — BISACODYL 5 MG
10 TABLET, DELAYED RELEASE (ENTERIC COATED) ORAL DAILY PRN
Status: DISCONTINUED | OUTPATIENT
Start: 2024-03-28 | End: 2024-03-29 | Stop reason: HOSPADM

## 2024-03-28 RX ORDER — HYDROMORPHONE HYDROCHLORIDE 1 MG/ML
1 INJECTION, SOLUTION INTRAMUSCULAR; INTRAVENOUS; SUBCUTANEOUS EVERY 5 MIN PRN
Status: DISCONTINUED | OUTPATIENT
Start: 2024-03-28 | End: 2024-03-28 | Stop reason: HOSPADM

## 2024-03-28 RX ORDER — CELECOXIB 200 MG/1
400 CAPSULE ORAL ONCE
Status: COMPLETED | OUTPATIENT
Start: 2024-03-28 | End: 2024-03-28

## 2024-03-28 RX ORDER — SODIUM CHLORIDE, SODIUM LACTATE, POTASSIUM CHLORIDE, CALCIUM CHLORIDE 600; 310; 30; 20 MG/100ML; MG/100ML; MG/100ML; MG/100ML
100 INJECTION, SOLUTION INTRAVENOUS CONTINUOUS
Status: DISCONTINUED | OUTPATIENT
Start: 2024-03-28 | End: 2024-03-29 | Stop reason: HOSPADM

## 2024-03-28 RX ORDER — SODIUM CHLORIDE, SODIUM LACTATE, POTASSIUM CHLORIDE, CALCIUM CHLORIDE 600; 310; 30; 20 MG/100ML; MG/100ML; MG/100ML; MG/100ML
50 INJECTION, SOLUTION INTRAVENOUS CONTINUOUS
Status: DISCONTINUED | OUTPATIENT
Start: 2024-03-28 | End: 2024-03-29 | Stop reason: HOSPADM

## 2024-03-28 RX ORDER — ALBUTEROL SULFATE 0.83 MG/ML
2.5 SOLUTION RESPIRATORY (INHALATION)
Status: DISCONTINUED | OUTPATIENT
Start: 2024-03-28 | End: 2024-03-28 | Stop reason: HOSPADM

## 2024-03-28 RX ORDER — TRANEXAMIC ACID 650 MG/1
1950 TABLET ORAL ONCE
Status: COMPLETED | OUTPATIENT
Start: 2024-03-28 | End: 2024-03-28

## 2024-03-28 RX ORDER — DIPHENHYDRAMINE HYDROCHLORIDE 50 MG/ML
12.5 INJECTION INTRAMUSCULAR; INTRAVENOUS ONCE AS NEEDED
Status: DISCONTINUED | OUTPATIENT
Start: 2024-03-28 | End: 2024-03-28 | Stop reason: HOSPADM

## 2024-03-28 RX ORDER — HYDRALAZINE HYDROCHLORIDE 20 MG/ML
5 INJECTION INTRAMUSCULAR; INTRAVENOUS EVERY 30 MIN PRN
Status: DISCONTINUED | OUTPATIENT
Start: 2024-03-28 | End: 2024-03-28 | Stop reason: HOSPADM

## 2024-03-28 RX ORDER — ROPIVACAINE/EPI/CLONIDINE/KET 2.46-0.005
SYRINGE (ML) INJECTION AS NEEDED
Status: DISCONTINUED | OUTPATIENT
Start: 2024-03-28 | End: 2024-03-28 | Stop reason: HOSPADM

## 2024-03-28 RX ORDER — PROPOFOL 10 MG/ML
INJECTION, EMULSION INTRAVENOUS AS NEEDED
Status: DISCONTINUED | OUTPATIENT
Start: 2024-03-28 | End: 2024-03-28

## 2024-03-28 RX ORDER — ASPIRIN 81 MG/1
81 TABLET ORAL 2 TIMES DAILY
Status: DISCONTINUED | OUTPATIENT
Start: 2024-03-28 | End: 2024-03-29 | Stop reason: HOSPADM

## 2024-03-28 RX ORDER — MIDAZOLAM HYDROCHLORIDE 1 MG/ML
INJECTION, SOLUTION INTRAMUSCULAR; INTRAVENOUS AS NEEDED
Status: DISCONTINUED | OUTPATIENT
Start: 2024-03-28 | End: 2024-03-28

## 2024-03-28 RX ORDER — POVIDONE-IODINE 5 %
SOLUTION, NON-ORAL OPHTHALMIC (EYE) AS NEEDED
Status: DISCONTINUED | OUTPATIENT
Start: 2024-03-28 | End: 2024-03-28 | Stop reason: HOSPADM

## 2024-03-28 RX ORDER — PROCHLORPERAZINE 25 MG/1
25 SUPPOSITORY RECTAL EVERY 12 HOURS PRN
Status: DISCONTINUED | OUTPATIENT
Start: 2024-03-28 | End: 2024-03-29 | Stop reason: HOSPADM

## 2024-03-28 RX ORDER — CEFAZOLIN SODIUM 2 G/100ML
2 INJECTION, SOLUTION INTRAVENOUS EVERY 8 HOURS
Status: COMPLETED | OUTPATIENT
Start: 2024-03-28 | End: 2024-03-29

## 2024-03-28 RX ORDER — SODIUM CHLORIDE, SODIUM LACTATE, POTASSIUM CHLORIDE, CALCIUM CHLORIDE 600; 310; 30; 20 MG/100ML; MG/100ML; MG/100ML; MG/100ML
100 INJECTION, SOLUTION INTRAVENOUS CONTINUOUS
Status: DISCONTINUED | OUTPATIENT
Start: 2024-03-28 | End: 2024-03-28 | Stop reason: HOSPADM

## 2024-03-28 RX ORDER — DEXAMETHASONE SODIUM PHOSPHATE 100 MG/10ML
INJECTION INTRAMUSCULAR; INTRAVENOUS AS NEEDED
Status: DISCONTINUED | OUTPATIENT
Start: 2024-03-28 | End: 2024-03-28

## 2024-03-28 RX ORDER — HYDROCODONE BITARTRATE AND ACETAMINOPHEN 5; 325 MG/1; MG/1
1 TABLET ORAL EVERY 4 HOURS PRN
Status: DISCONTINUED | OUTPATIENT
Start: 2024-03-28 | End: 2024-03-28 | Stop reason: HOSPADM

## 2024-03-28 RX ORDER — ONDANSETRON HYDROCHLORIDE 2 MG/ML
4 INJECTION, SOLUTION INTRAVENOUS EVERY 8 HOURS PRN
Status: DISCONTINUED | OUTPATIENT
Start: 2024-03-28 | End: 2024-03-29 | Stop reason: HOSPADM

## 2024-03-28 RX ORDER — DIPHENHYDRAMINE HCL 12.5MG/5ML
12.5 LIQUID (ML) ORAL EVERY 6 HOURS PRN
Status: DISCONTINUED | OUTPATIENT
Start: 2024-03-28 | End: 2024-03-29 | Stop reason: HOSPADM

## 2024-03-28 RX ORDER — METOCLOPRAMIDE HYDROCHLORIDE 5 MG/ML
10 INJECTION INTRAMUSCULAR; INTRAVENOUS ONCE AS NEEDED
Status: DISCONTINUED | OUTPATIENT
Start: 2024-03-28 | End: 2024-03-28 | Stop reason: HOSPADM

## 2024-03-28 RX ORDER — PROCHLORPERAZINE EDISYLATE 5 MG/ML
10 INJECTION INTRAMUSCULAR; INTRAVENOUS EVERY 6 HOURS PRN
Status: DISCONTINUED | OUTPATIENT
Start: 2024-03-28 | End: 2024-03-29 | Stop reason: HOSPADM

## 2024-03-28 RX ORDER — GLYCOPYRROLATE 0.2 MG/ML
INJECTION INTRAMUSCULAR; INTRAVENOUS AS NEEDED
Status: DISCONTINUED | OUTPATIENT
Start: 2024-03-28 | End: 2024-03-28

## 2024-03-28 RX ORDER — ACETAMINOPHEN 325 MG/1
650 TABLET ORAL EVERY 6 HOURS SCHEDULED
Status: DISCONTINUED | OUTPATIENT
Start: 2024-03-28 | End: 2024-03-29 | Stop reason: HOSPADM

## 2024-03-28 RX ORDER — HYDROMORPHONE HYDROCHLORIDE 1 MG/ML
INJECTION, SOLUTION INTRAMUSCULAR; INTRAVENOUS; SUBCUTANEOUS AS NEEDED
Status: DISCONTINUED | OUTPATIENT
Start: 2024-03-28 | End: 2024-03-28

## 2024-03-28 RX ORDER — ONDANSETRON 4 MG/1
4 TABLET, ORALLY DISINTEGRATING ORAL EVERY 8 HOURS PRN
Status: DISCONTINUED | OUTPATIENT
Start: 2024-03-28 | End: 2024-03-29 | Stop reason: HOSPADM

## 2024-03-28 RX ORDER — TRANEXAMIC ACID 650 MG/1
1950 TABLET ORAL ONCE
Status: COMPLETED | OUTPATIENT
Start: 2024-03-29 | End: 2024-03-29

## 2024-03-28 RX ORDER — MORPHINE SULFATE 2 MG/ML
2 INJECTION, SOLUTION INTRAMUSCULAR; INTRAVENOUS EVERY 2 HOUR PRN
Status: DISCONTINUED | OUTPATIENT
Start: 2024-03-28 | End: 2024-03-29 | Stop reason: HOSPADM

## 2024-03-28 RX ORDER — FENTANYL CITRATE 50 UG/ML
INJECTION, SOLUTION INTRAMUSCULAR; INTRAVENOUS AS NEEDED
Status: DISCONTINUED | OUTPATIENT
Start: 2024-03-28 | End: 2024-03-28

## 2024-03-28 RX ORDER — CEFAZOLIN SODIUM 2 G/100ML
2 INJECTION, SOLUTION INTRAVENOUS ONCE
Status: COMPLETED | OUTPATIENT
Start: 2024-03-28 | End: 2024-03-28

## 2024-03-28 RX ORDER — ACETAMINOPHEN 325 MG/1
975 TABLET ORAL ONCE
Status: COMPLETED | OUTPATIENT
Start: 2024-03-28 | End: 2024-03-28

## 2024-03-28 RX ORDER — GABAPENTIN 600 MG/1
600 TABLET ORAL ONCE
Status: COMPLETED | OUTPATIENT
Start: 2024-03-28 | End: 2024-03-28

## 2024-03-28 RX ORDER — OXYCODONE HYDROCHLORIDE 10 MG/1
10 TABLET ORAL EVERY 4 HOURS PRN
Status: DISCONTINUED | OUTPATIENT
Start: 2024-03-28 | End: 2024-03-29 | Stop reason: HOSPADM

## 2024-03-28 RX ORDER — ROCURONIUM BROMIDE 10 MG/ML
INJECTION, SOLUTION INTRAVENOUS AS NEEDED
Status: DISCONTINUED | OUTPATIENT
Start: 2024-03-28 | End: 2024-03-28

## 2024-03-28 RX ORDER — SODIUM CHLORIDE 0.9 G/100ML
IRRIGANT IRRIGATION AS NEEDED
Status: DISCONTINUED | OUTPATIENT
Start: 2024-03-28 | End: 2024-03-28 | Stop reason: HOSPADM

## 2024-03-28 RX ORDER — OXYCODONE HYDROCHLORIDE 5 MG/1
5 TABLET ORAL EVERY 4 HOURS PRN
Status: DISCONTINUED | OUTPATIENT
Start: 2024-03-28 | End: 2024-03-29 | Stop reason: HOSPADM

## 2024-03-28 RX ORDER — ONDANSETRON HYDROCHLORIDE 2 MG/ML
INJECTION, SOLUTION INTRAVENOUS AS NEEDED
Status: DISCONTINUED | OUTPATIENT
Start: 2024-03-28 | End: 2024-03-28

## 2024-03-28 RX ORDER — KETOROLAC TROMETHAMINE 15 MG/ML
15 INJECTION, SOLUTION INTRAMUSCULAR; INTRAVENOUS EVERY 6 HOURS
Status: DISCONTINUED | OUTPATIENT
Start: 2024-03-28 | End: 2024-03-29 | Stop reason: HOSPADM

## 2024-03-28 RX ORDER — MIDAZOLAM HYDROCHLORIDE 1 MG/ML
1 INJECTION, SOLUTION INTRAMUSCULAR; INTRAVENOUS ONCE AS NEEDED
Status: DISCONTINUED | OUTPATIENT
Start: 2024-03-28 | End: 2024-03-28 | Stop reason: HOSPADM

## 2024-03-28 RX ORDER — LABETALOL HYDROCHLORIDE 5 MG/ML
5 INJECTION, SOLUTION INTRAVENOUS
Status: DISCONTINUED | OUTPATIENT
Start: 2024-03-28 | End: 2024-03-28 | Stop reason: HOSPADM

## 2024-03-28 RX ORDER — PROCHLORPERAZINE MALEATE 10 MG
10 TABLET ORAL EVERY 6 HOURS PRN
Status: DISCONTINUED | OUTPATIENT
Start: 2024-03-28 | End: 2024-03-29 | Stop reason: HOSPADM

## 2024-03-28 RX ORDER — DOCUSATE SODIUM 100 MG/1
100 CAPSULE, LIQUID FILLED ORAL 2 TIMES DAILY
Status: DISCONTINUED | OUTPATIENT
Start: 2024-03-28 | End: 2024-03-29 | Stop reason: HOSPADM

## 2024-03-28 RX ADMIN — SODIUM CHLORIDE, POTASSIUM CHLORIDE, SODIUM LACTATE AND CALCIUM CHLORIDE: 600; 310; 30; 20 INJECTION, SOLUTION INTRAVENOUS at 15:03

## 2024-03-28 RX ADMIN — SUGAMMADEX 200 MG: 100 INJECTION, SOLUTION INTRAVENOUS at 16:12

## 2024-03-28 RX ADMIN — TRANEXAMIC ACID 1950 MG: 650 TABLET ORAL at 13:25

## 2024-03-28 RX ADMIN — ROCURONIUM BROMIDE 50 MG: 10 INJECTION INTRAVENOUS at 14:05

## 2024-03-28 RX ADMIN — HYDROMORPHONE HYDROCHLORIDE 0.5 MG: 1 INJECTION, SOLUTION INTRAMUSCULAR; INTRAVENOUS; SUBCUTANEOUS at 15:05

## 2024-03-28 RX ADMIN — KETOROLAC TROMETHAMINE 15 MG: 15 INJECTION, SOLUTION INTRAMUSCULAR; INTRAVENOUS at 21:20

## 2024-03-28 RX ADMIN — POVIDONE-IODINE 1 APPLICATION: 5 SOLUTION TOPICAL at 13:29

## 2024-03-28 RX ADMIN — ROCURONIUM BROMIDE 10 MG: 10 INJECTION INTRAVENOUS at 15:11

## 2024-03-28 RX ADMIN — GLYCOPYRROLATE 0.2 MG: 0.2 INJECTION, SOLUTION INTRAMUSCULAR; INTRAVENOUS at 13:59

## 2024-03-28 RX ADMIN — SODIUM CHLORIDE, POTASSIUM CHLORIDE, SODIUM LACTATE AND CALCIUM CHLORIDE: 600; 310; 30; 20 INJECTION, SOLUTION INTRAVENOUS at 13:59

## 2024-03-28 RX ADMIN — MIDAZOLAM 2 MG: 1 INJECTION INTRAMUSCULAR; INTRAVENOUS at 13:58

## 2024-03-28 RX ADMIN — ACETAMINOPHEN 650 MG: 325 TABLET ORAL at 21:21

## 2024-03-28 RX ADMIN — ROCURONIUM BROMIDE 10 MG: 10 INJECTION INTRAVENOUS at 15:36

## 2024-03-28 RX ADMIN — ASPIRIN 81 MG: 81 TABLET, COATED ORAL at 21:21

## 2024-03-28 RX ADMIN — CEFAZOLIN SODIUM 2 G: 2 INJECTION, SOLUTION INTRAVENOUS at 21:21

## 2024-03-28 RX ADMIN — CEFAZOLIN SODIUM 2 G: 2 INJECTION, SOLUTION INTRAVENOUS at 14:13

## 2024-03-28 RX ADMIN — PROPOFOL 300 MG: 10 INJECTION, EMULSION INTRAVENOUS at 14:12

## 2024-03-28 RX ADMIN — LIDOCAINE HYDROCHLORIDE 100 MG: 20 INJECTION, SOLUTION EPIDURAL; INFILTRATION; INTRACAUDAL; PERINEURAL at 14:05

## 2024-03-28 RX ADMIN — SODIUM CHLORIDE, POTASSIUM CHLORIDE, SODIUM LACTATE AND CALCIUM CHLORIDE 100 ML/HR: 600; 310; 30; 20 INJECTION, SOLUTION INTRAVENOUS at 13:29

## 2024-03-28 RX ADMIN — ROCURONIUM BROMIDE 20 MG: 10 INJECTION INTRAVENOUS at 14:41

## 2024-03-28 RX ADMIN — PROPOFOL 200 MG: 10 INJECTION, EMULSION INTRAVENOUS at 14:05

## 2024-03-28 RX ADMIN — ONDANSETRON 4 MG: 2 INJECTION, SOLUTION INTRAMUSCULAR; INTRAVENOUS at 16:09

## 2024-03-28 RX ADMIN — ACETAMINOPHEN 975 MG: 325 TABLET ORAL at 13:25

## 2024-03-28 RX ADMIN — SODIUM CHLORIDE, POTASSIUM CHLORIDE, SODIUM LACTATE AND CALCIUM CHLORIDE 50 ML/HR: 600; 310; 30; 20 INJECTION, SOLUTION INTRAVENOUS at 18:25

## 2024-03-28 RX ADMIN — DEXAMETHASONE SODIUM PHOSPHATE 8 MG: 10 INJECTION INTRAMUSCULAR; INTRAVENOUS at 14:13

## 2024-03-28 RX ADMIN — TRANEXAMIC ACID 1950 MG: 650 TABLET ORAL at 21:30

## 2024-03-28 RX ADMIN — HYDROMORPHONE HYDROCHLORIDE 0.5 MG: 1 INJECTION, SOLUTION INTRAMUSCULAR; INTRAVENOUS; SUBCUTANEOUS at 14:36

## 2024-03-28 RX ADMIN — HYDROMORPHONE HYDROCHLORIDE 0.5 MG: 1 INJECTION, SOLUTION INTRAMUSCULAR; INTRAVENOUS; SUBCUTANEOUS at 14:41

## 2024-03-28 RX ADMIN — FENTANYL CITRATE 50 MCG: 50 INJECTION, SOLUTION INTRAMUSCULAR; INTRAVENOUS at 14:17

## 2024-03-28 RX ADMIN — HYDROMORPHONE HYDROCHLORIDE 0.5 MG: 1 INJECTION, SOLUTION INTRAMUSCULAR; INTRAVENOUS; SUBCUTANEOUS at 16:12

## 2024-03-28 RX ADMIN — GABAPENTIN 600 MG: 600 TABLET, FILM COATED ORAL at 13:25

## 2024-03-28 RX ADMIN — CELECOXIB 400 MG: 200 CAPSULE ORAL at 13:25

## 2024-03-28 RX ADMIN — FENTANYL CITRATE 50 MCG: 50 INJECTION, SOLUTION INTRAMUSCULAR; INTRAVENOUS at 14:05

## 2024-03-28 SDOH — SOCIAL STABILITY: SOCIAL INSECURITY: DO YOU FEEL UNSAFE GOING BACK TO THE PLACE WHERE YOU ARE LIVING?: NO

## 2024-03-28 SDOH — SOCIAL STABILITY: SOCIAL INSECURITY: HAS ANYONE EVER THREATENED TO HURT YOUR FAMILY OR YOUR PETS?: NO

## 2024-03-28 SDOH — SOCIAL STABILITY: SOCIAL INSECURITY: DOES ANYONE TRY TO KEEP YOU FROM HAVING/CONTACTING OTHER FRIENDS OR DOING THINGS OUTSIDE YOUR HOME?: NO

## 2024-03-28 SDOH — SOCIAL STABILITY: SOCIAL INSECURITY: ABUSE: ADULT

## 2024-03-28 SDOH — HEALTH STABILITY: MENTAL HEALTH: CURRENT SMOKER: 1

## 2024-03-28 SDOH — SOCIAL STABILITY: SOCIAL INSECURITY: WERE YOU ABLE TO COMPLETE ALL THE BEHAVIORAL HEALTH SCREENINGS?: YES

## 2024-03-28 SDOH — SOCIAL STABILITY: SOCIAL INSECURITY: DO YOU FEEL ANYONE HAS EXPLOITED OR TAKEN ADVANTAGE OF YOU FINANCIALLY OR OF YOUR PERSONAL PROPERTY?: NO

## 2024-03-28 SDOH — SOCIAL STABILITY: SOCIAL INSECURITY: ARE THERE ANY APPARENT SIGNS OF INJURIES/BEHAVIORS THAT COULD BE RELATED TO ABUSE/NEGLECT?: NO

## 2024-03-28 SDOH — SOCIAL STABILITY: SOCIAL INSECURITY: HAVE YOU HAD THOUGHTS OF HARMING ANYONE ELSE?: NO

## 2024-03-28 SDOH — SOCIAL STABILITY: SOCIAL INSECURITY: ARE YOU OR HAVE YOU BEEN THREATENED OR ABUSED PHYSICALLY, EMOTIONALLY, OR SEXUALLY BY ANYONE?: NO

## 2024-03-28 ASSESSMENT — PATIENT HEALTH QUESTIONNAIRE - PHQ9
1. LITTLE INTEREST OR PLEASURE IN DOING THINGS: NOT AT ALL
2. FEELING DOWN, DEPRESSED OR HOPELESS: NOT AT ALL
SUM OF ALL RESPONSES TO PHQ9 QUESTIONS 1 & 2: 0

## 2024-03-28 ASSESSMENT — COLUMBIA-SUICIDE SEVERITY RATING SCALE - C-SSRS
6. HAVE YOU EVER DONE ANYTHING, STARTED TO DO ANYTHING, OR PREPARED TO DO ANYTHING TO END YOUR LIFE?: NO
1. IN THE PAST MONTH, HAVE YOU WISHED YOU WERE DEAD OR WISHED YOU COULD GO TO SLEEP AND NOT WAKE UP?: NO
1. IN THE PAST MONTH, HAVE YOU WISHED YOU WERE DEAD OR WISHED YOU COULD GO TO SLEEP AND NOT WAKE UP?: NO
2. HAVE YOU ACTUALLY HAD ANY THOUGHTS OF KILLING YOURSELF?: NO
6. HAVE YOU EVER DONE ANYTHING, STARTED TO DO ANYTHING, OR PREPARED TO DO ANYTHING TO END YOUR LIFE?: NO
2. HAVE YOU ACTUALLY HAD ANY THOUGHTS OF KILLING YOURSELF?: NO

## 2024-03-28 ASSESSMENT — COGNITIVE AND FUNCTIONAL STATUS - GENERAL
MOVING FROM LYING ON BACK TO SITTING ON SIDE OF FLAT BED WITH BEDRAILS: A LITTLE
MOBILITY SCORE: 18
WALKING IN HOSPITAL ROOM: A LITTLE
DRESSING REGULAR LOWER BODY CLOTHING: A LITTLE
MOVING FROM LYING ON BACK TO SITTING ON SIDE OF FLAT BED WITH BEDRAILS: A LITTLE
MOVING TO AND FROM BED TO CHAIR: A LITTLE
MOBILITY SCORE: 18
PATIENT BASELINE BEDBOUND: NO
CLIMB 3 TO 5 STEPS WITH RAILING: A LITTLE
PERSONAL GROOMING: A LITTLE
TURNING FROM BACK TO SIDE WHILE IN FLAT BAD: A LITTLE
DRESSING REGULAR UPPER BODY CLOTHING: A LITTLE
CLIMB 3 TO 5 STEPS WITH RAILING: A LITTLE
WALKING IN HOSPITAL ROOM: A LITTLE
STANDING UP FROM CHAIR USING ARMS: A LITTLE
TOILETING: A LITTLE
TURNING FROM BACK TO SIDE WHILE IN FLAT BAD: A LITTLE
HELP NEEDED FOR BATHING: A LITTLE
DRESSING REGULAR LOWER BODY CLOTHING: A LITTLE
HELP NEEDED FOR BATHING: A LITTLE
EATING MEALS: A LITTLE
DAILY ACTIVITIY SCORE: 18
STANDING UP FROM CHAIR USING ARMS: A LITTLE
MOVING TO AND FROM BED TO CHAIR: A LITTLE
DAILY ACTIVITIY SCORE: 21
TOILETING: A LITTLE

## 2024-03-28 ASSESSMENT — PAIN SCALES - GENERAL
PAINLEVEL_OUTOF10: 9
PAINLEVEL_OUTOF10: 0 - NO PAIN
PAIN_LEVEL: 0

## 2024-03-28 ASSESSMENT — ACTIVITIES OF DAILY LIVING (ADL)
GROOMING: INDEPENDENT
HEARING - RIGHT EAR: FUNCTIONAL
JUDGMENT_ADEQUATE_SAFELY_COMPLETE_DAILY_ACTIVITIES: YES
DRESSING YOURSELF: NEEDS ASSISTANCE
ADEQUATE_TO_COMPLETE_ADL: YES
BATHING: INDEPENDENT
HEARING - LEFT EAR: FUNCTIONAL
LACK_OF_TRANSPORTATION: PATIENT DECLINED
PATIENT'S MEMORY ADEQUATE TO SAFELY COMPLETE DAILY ACTIVITIES?: YES
LACK_OF_TRANSPORTATION: PATIENT DECLINED
WALKS IN HOME: INDEPENDENT
FEEDING YOURSELF: INDEPENDENT
ASSISTIVE_DEVICE: EYEGLASSES
TOILETING: INDEPENDENT

## 2024-03-28 ASSESSMENT — PAIN - FUNCTIONAL ASSESSMENT
PAIN_FUNCTIONAL_ASSESSMENT: 0-10
PAIN_FUNCTIONAL_ASSESSMENT: 0-10
PAIN_FUNCTIONAL_ASSESSMENT: UNABLE TO SELF-REPORT
PAIN_FUNCTIONAL_ASSESSMENT: 0-10

## 2024-03-28 ASSESSMENT — LIFESTYLE VARIABLES
HOW OFTEN DO YOU HAVE 6 OR MORE DRINKS ON ONE OCCASION: NEVER
PRESCIPTION_ABUSE_PAST_12_MONTHS: NO
AUDIT-C TOTAL SCORE: 4
AUDIT-C TOTAL SCORE: 4
HOW MANY STANDARD DRINKS CONTAINING ALCOHOL DO YOU HAVE ON A TYPICAL DAY: 1 OR 2
SUBSTANCE_ABUSE_PAST_12_MONTHS: NO
HOW OFTEN DO YOU HAVE A DRINK CONTAINING ALCOHOL: 4 OR MORE TIMES A WEEK
SKIP TO QUESTIONS 9-10: 1

## 2024-03-28 NOTE — ANESTHESIA PREPROCEDURE EVALUATION
Patient: Ramesh Julian    Procedure Information       Date/Time: 03/28/24 1430    Procedure: ARTHROPLASTY RIGHT HIP TRISTAN (Right: Hip) - 23 HR. OBS.BEDDED OP    Location: [facility] OR  / [facility] OR    Surgeons: Vaughn Adler MD            Relevant Problems   Musculoskeletal   (+) Unilateral primary osteoarthritis, right hip       Clinical information reviewed:   Tobacco  Allergies  Meds   Med Hx  Surg Hx   Fam Hx  Soc Hx        NPO Detail:  No data recorded     Physical Exam    Airway  Mallampati: III  TM distance: >3 FB  Neck ROM: full  Comments: Short neck   Cardiovascular - normal exam     Dental - normal exam     Pulmonary - normal exam     Abdominal - normal exam         There were no vitals filed for this visit.    Past Surgical History:   Procedure Laterality Date    WISDOM TOOTH EXTRACTION       Past Medical History:   Diagnosis Date    Arthritis     Dental disease     Joint pain     OA (osteoarthritis)     Smoker     Smoker        Current Facility-Administered Medications:     acetaminophen (Tylenol) tablet 975 mg, 975 mg, oral, Once, Vaughn Adler MD    ceFAZolin in dextrose (iso-os) (Ancef) IVPB 2 g, 2 g, intravenous, Once, Vaughn Adler MD    celecoxib (CeleBREX) capsule 400 mg, 400 mg, oral, Once, Vaughn Adler MD    gabapentin (Neurontin) tablet 600 mg, 600 mg, oral, Once, Vaughn Adler MD    lactated Ringer's infusion, 100 mL/hr, intravenous, Continuous, Vaughn Adler MD    povidone-iodine 5 % kit kit, , Topical, Once, Vaughn Adler MD    tranexamic acid (Lysteda) tablet 1,950 mg, 1,950 mg, oral, Once, Vaughn Adler MD  Prior to Admission medications    Medication Sig Start Date End Date Taking? Authorizing Provider   chlorhexidine (Peridex) 0.12 % solution Swish and spit 15 ml for 2 doses, 15mL the night before surgery and 15 ml morning of surgery - swish for 30 seconds -DO NOT SWALLOW, SPIT OUT 3/14/24   SAKSHI Bermudez-CNP   L. rhamnosus GG/inulin (CULTURELLE  "DIGESTIVE HEALTH ORAL) Take 1 tablet by mouth once daily.    Historical Provider, MD   multivitamin tablet Take 1 tablet by mouth once daily.    Historical Provider, MD   naproxen (Naprosyn) 500 mg tablet Take 1 tablet (500 mg) by mouth 2 times a day. 1/24/24 3/24/24  Vaughn Adler MD     No Known Allergies  Social History     Tobacco Use    Smoking status: Every Day     Packs/day: .5     Types: Cigarettes    Smokeless tobacco: Never   Substance Use Topics    Alcohol use: Yes     Comment: social         Chemistry    Lab Results   Component Value Date/Time     03/14/2024 1051    K 4.1 03/14/2024 1051     (H) 03/14/2024 1051    CO2 26 03/14/2024 1051    BUN 12 03/14/2024 1051    CREATININE 0.85 03/14/2024 1051    Lab Results   Component Value Date/Time    CALCIUM 9.3 03/14/2024 1051    ALKPHOS 82 03/14/2024 1051    AST 33 03/14/2024 1051    ALT 54 (H) 03/14/2024 1051    BILITOT 0.5 03/14/2024 1051          Lab Results   Component Value Date/Time    WBC 7.8 03/14/2024 1051    HGB 13.3 (L) 03/14/2024 1051    HCT 38.0 (L) 03/14/2024 1051     03/14/2024 1051     Lab Results   Component Value Date/Time    PROTIME 11.1 03/14/2024 1051    INR 1.0 03/14/2024 1051     Encounter Date: 03/14/24   ECG 12 Lead   Result Value    Ventricular Rate 79    Atrial Rate 79    CT Interval 164    QRS Duration 92    QT Interval 380    QTC Calculation(Bazett) 435    P Axis 41    R Axis 3    T Axis 10    QRS Count 13    Q Onset 224    P Onset 142    P Offset 199    T Offset 414    QTC Fredericia 416    Narrative    Normal sinus rhythm  T wave abnormality, consider anterolateral ischemia  Abnormal ECG  No previous ECGs available  Confirmed by Cipriano Buenrostro ([id number]) on 3/14/2024 2:57:21 PM        Anesthesia Plan    History of general anesthesia?: yes  History of complications of general anesthesia?: no    ASA 2     general   (Absolutely refuses spinal anesthetic.Concerned about \"needle in the back\".)  The patient is a current " smoker.  Patient did not smoke on day of procedure.    intravenous induction   Anesthetic plan and risks discussed with patient.    Plan discussed with CAA.

## 2024-03-28 NOTE — OP NOTE
ARTHROPLASTY RIGHT HIP TRISTAN (R) Operative Note      Date: 3/28/2024  OR Location: [facility] OR    Name: Ramesh Julian, : 1970, Age: 53 y.o., MRN: [id number], Sex: male    Diagnosis  Pre-op Diagnosis     * Unilateral primary osteoarthritis, right hip [M16.11] Post-op Diagnosis     * Unilateral primary osteoarthritis, right hip [M16.11]     Procedures  ARTHROPLASTY RIGHT HIP TRISTAN  30257 - IN ARTHRP ACETBLR/PROX FEM PROSTC AGRFT/ALGRFT    Surgeons      * Vaughn Adler - Primary    Resident/Fellow/Other Assistant:  Surgeon(s) and Role:     * Graham Sheppard PA-C - Assisting    Procedure Summary  Anesthesia: Spinal  ASA: II  Anesthesia Staff: Anesthesiologist: Ale Hearn MD  C-AA: GABRIEL Turner  Estimated Blood Loss: 100mL  Intra-op Medications:   Administrations occurring from 1430 to 1705 on 24:   Medication Name Total Dose   ropivacaine-epinephrine-clonidine-ketorolac 2.46-0.005- 0.0008-0.3mg/mL periarticular syringe 50 mL   sodium chloride 0.9 % irrigation solution 3,000 mL   povidone-iodine 5 % ophthalmic solution 1 Application   lactated Ringer's infusion Cannot be calculated              Anesthesia Record               Intraprocedure I/O Totals          Intake    lactated Ringer's infusion 1000.00 mL    ceFAZolin in dextrose (iso-os) (Ancef) IVPB 2 g 100.00 mL    Total Intake 1100 mL          Specimen: No specimens collected     Staff:   Circulator: Garret Neri RN; Jacob Calvillo Jr., RN  Relief Circulator: Humaira Lo RN  Scrub Person: Jenny Rodrigues         Drains and/or Catheters: * None in log *    Tourniquet Times:         Implants:  Implants       Type Name Action Serial No.      Screw SCREW, LOW PROFILE HEX, 6.5 X 25 MM - S000 - [id number] Used, Not Implanted 000     Joint SHELL, TRIDENT II, CLUSTERHOLE, 50D -  - [id number] Implanted 0000     Joint INSERT, TRIDENT X3 POLYETHYLENE, 0 DEG, 36MM D - S00 - [id number] Implanted 00     Screw SCREW, LOW  PROFILE HEX, 6.5 X 20 MM - S00 - [id number] Implanted 00     Joint HEAD, FEMUR V40 36MM +7.5MM BIOLOX DELTA - S000 - [id number] Implanted 000     Joint STEM, FEM ACCOLADE + TMZF SYS 127D SZ4 - S0 - [id number] Implanted 0      70CM STRATAFIX SPIRAL SUTURE, 3-0, MONO, PS-1, UNDYED Implanted               Findings: see procedure details    Operative Report:   Preoperative diagnosis DJD hip severe right hip  Postoperative diagnosis same  Procedure total hip replacement using the [employer] computer assisted robotic-assisted total hip replacement system    Primary surgeon Vaughn Adler III, MD    Implants Foss  Cup 50 mm Trident hemispherical  Liner neutral polyethylene liner  Stem Accolade 2 uncemented size 4.  127 degree neck angle  Head Biolox delta ceramic 36 mm neck length +7.5    Anesthesia [spinal]  EBL [100]      Patient is suffering from chronic hip pain that has been refractory to conservative treatment and now presents for total hip replacement.  The risks benefits outcomes and postoperative course were fully explained to the patient.  We discussed  loosening, infection, blood clots, loss of life, loss of limb, nerve damage, numbness, leg length discrepancy, failure of the procedure, progressive arthritis, and the potential need for revision surgery.  The patient understands these risks and consents to the surgical intervention.    The patient has pain in the hip that is increased with activity and weightbearing, and walks with an antalgic gait.  The pain is interfering with activities of daily living.  Patient has limited range of motion and pain with passive range of motion.  X-rays demonstrate joint space narrowing, subchondral sclerosis and osteophyte formation.  Symptoms are not improving with medication, physical therapy or supportive device for a period of at least 3 months.    The physician assistant was present through the entire case.  Given the nature of the disease process and the procedure to be  performed skilled surgical first assistant was necessary during the case.  The assistant was necessary to hold retractors and manipulate the extremity during the procedure.  A certified scrub tech was at the back table managing the instruments and supplies for the surgical case.      Patient was brought to the operating room and placed on the surgical table in the supine position where adequate anesthesia was then obtained.  A surgical timeout was then performed.  The patient was positioned in the lateral decubitus position with the affected hip up being careful to pad all pressure points.  The patient was then prepped and draped in usual sterile manner.     A pelvic array was placed along the anterior iliac crest through 3 percutaneous puncture wounds being careful to protect the lateral femoral cutaneous nerve       A standard posterolateral approach to the hip was made and electrocauterization was used for hemostasis.  The IT band was split in line with its fibers and anterior and posterior retractors were then inserted.    A checkpoint was placed in the greater trochanter and the leg length was measured between the checkpoint and the greater trochanter and the array as well as the markers were placed along the distal femur and the array    The hip was maximally internally rotated and the short external rotators and piriformis tendon were taken down in 1 layer along with the joint capsule using the Bovie electrocautery device.  The capsule was teed in line with the piriformis tendon.  The hip was then dislocated and the femoral neck resection was made at a  distance that was pre-determined with the preoperative CT scan and 3-D reconstruction in order to reestablish femoral offset and leg length  The femoral head showed signs of severe arthritic changes with joint calcification and loss of articular cartilage.    Anterior and posterior acetabular retractors were then inserted and the acetabular labrum was sharply  excised using a bovie cautery.  Next acetabulum was curetted to remove any remaining soft tissues and sequential reaming was performed.  Acetabular checkpoint was placed in the superior dome and land marking was performed and confirmed     We then proceeded to ream with the acetabular reamers and the Colondee robotic arm assisted in the appropriate abduction and anteversion we reamed to the appropriate depth which had been predetermined as well based on the preoperative CT scan. Once final reaming was complete this had excellent circumferential fit and good bleeding bone.  Porous acetabular shell was then inserted in exactly 40 degrees of abduction and 25 degrees of anteversion using the robotic arm.  The acetabular shell was probed and stable and was completely seated.  The acetabular liner was then inserted and completely seated as well.  The liner was probed and stable.    At this point femoral preparation was begun.  A femoral neck retractor was inserted and anterior and posterior femoral retractors were inserted as well.  A box osteotome was used to start the femoral canal and a Charnley awl was inserted down the canal.  Sequential broaching was then performed being careful to keep the broaches in 10-15 degrees of anteversion.  With the final broach in position and completely seated calcar planing was performed.  A trial reduction was then performed and the hip was stable to an anatomic range of motion.  Leg lengths were confirmed using the TRISTAN system.  Trial components were then removed the final femoral stem was inserted and completely seated.  Femoral head and liner were applied to reduced.  Range of motion was again assessed and felt to be satisfactory leg lengths were assessed using the MAOK system as well.  The joint was then irrigated with a copious amount of pulsatile irrigation.    Closure was begun using #2 Ethibond sutures through the joint capsule short external rotators and piriformis tendon that were  tied through drill holes in the piriformis fossa of the femur.  The IT band was closed using a #1 Vicryl suture.  2-0 vicrylsuture was used for the underlying subcutaneous tissues and 4-0 monoycryl .  Skin glue was used for the skin and a dry sterile Aquacell dressing was used for bandage.   Pelvic array was removed and wounds closed with 4-0 nylon suture.  Aquacell bandage applied.    The patient tolerated the procedure well and was taken to the postanesthesia care unit in satisfactory condition.  Postoperative x-rays were reviewed and surgical findings were discussed with the patient's family at the conclusion of the procedure.    This note was prepared using voice recognition software.  The details of this note are correct and have been reviewed, and corrected to the best of my ability.  Some grammatical areas may persist related to the Dragon software    Postoperative plan  Weight-bear as tolerated operative extremity  Posterior hip precautions 6 weeks  DVT prophylaxis Aspirin 81mg PO Bid  PT OT  Follow-up 2 weeks    Complications:  None; patient tolerated the procedure well.    Disposition: PACU - hemodynamically stable.  Condition: stable         Vaughn Adler  Phone Number: [phone]

## 2024-03-28 NOTE — ANESTHESIA PROCEDURE NOTES
Airway  Date/Time: 3/28/2024 2:09 PM  Urgency: elective    Airway not difficult    Staffing  Performed: GABRIEL   Authorized by: Ale Hearn MD    Performed by: GABRIEL Turner  Patient location during procedure: OR    Indications and Patient Condition  Indications for airway management: anesthesia  Spontaneous ventilation: present  Preoxygenated: yes  Patient position: sniffing  Mask difficulty assessment: 1 - vent by mask    Final Airway Details  Final airway type: endotracheal airway      Successful airway: ETT     Successful intubation technique: direct laryngoscopy  Facilitating devices/methods: intubating stylet  Endotracheal tube insertion site: oral  Blade: Kena  Blade size: #4  ETT size (mm): 7.5  Cormack-Lehane Classification: grade I - full view of glottis  Placement verified by: chest auscultation and capnometry   Measured from: gums  ETT to gums (cm): 24  Number of attempts at approach: 1    Additional Comments  Grade 1

## 2024-03-28 NOTE — ANESTHESIA POSTPROCEDURE EVALUATION
Patient: Ramesh Julian    Procedure Summary       Date: 03/28/24 Room / Location: [facility] OR 04 / Virtual [facility]    Anesthesia Start: 1358 Anesthesia Stop: 1646    Procedure: ARTHROPLASTY RIGHT HIP TRISTNA (Right: Hip) Diagnosis:       Unilateral primary osteoarthritis, right hip      (Unilateral primary osteoarthritis, right hip [M16.11])    Surgeons: Vaughn Adler MD Responsible Provider: Ale Hearn MD    Anesthesia Type: general ASA Status: 2            Anesthesia Type: general    Vitals Value Taken Time   /72 03/28/24 1645   Temp 36 °C (96.8 °F) 03/28/24 1645   Pulse 80 03/28/24 1645   Resp 11 03/28/24 1645   SpO2 100 % 03/28/24 1645       Anesthesia Post Evaluation    Patient location during evaluation: PACU  Patient participation: complete - patient participated  Level of consciousness: sleepy but conscious  Pain score: 0  Pain management: adequate  Airway patency: patent  Cardiovascular status: acceptable  Respiratory status: acceptable  Hydration status: acceptable  Postoperative Nausea and Vomiting: none    No notable events documented.

## 2024-03-29 ENCOUNTER — DOCUMENTATION (OUTPATIENT)
Dept: HOME HEALTH SERVICES | Facility: HOME HEALTH | Age: 54
End: 2024-03-29
Payer: COMMERCIAL

## 2024-03-29 ENCOUNTER — HOME HEALTH ADMISSION (OUTPATIENT)
Dept: HOME HEALTH SERVICES | Facility: HOME HEALTH | Age: 54
End: 2024-03-29
Payer: COMMERCIAL

## 2024-03-29 VITALS
TEMPERATURE: 97.5 F | DIASTOLIC BLOOD PRESSURE: 77 MMHG | HEIGHT: 65 IN | WEIGHT: 199 LBS | SYSTOLIC BLOOD PRESSURE: 131 MMHG | RESPIRATION RATE: 16 BRPM | BODY MASS INDEX: 33.15 KG/M2 | OXYGEN SATURATION: 96 % | HEART RATE: 75 BPM

## 2024-03-29 LAB
ANION GAP SERPL CALC-SCNC: 14 MMOL/L (ref 10–20)
BUN SERPL-MCNC: 10 MG/DL (ref 6–23)
CALCIUM SERPL-MCNC: 8.4 MG/DL (ref 8.6–10.3)
CHLORIDE SERPL-SCNC: 103 MMOL/L (ref 98–107)
CO2 SERPL-SCNC: 22 MMOL/L (ref 21–32)
CREAT SERPL-MCNC: 0.92 MG/DL (ref 0.5–1.3)
EGFRCR SERPLBLD CKD-EPI 2021: >90 ML/MIN/1.73M*2
ERYTHROCYTE [DISTWIDTH] IN BLOOD BY AUTOMATED COUNT: 12 % (ref 11.5–14.5)
GLUCOSE SERPL-MCNC: 115 MG/DL (ref 74–99)
HCT VFR BLD AUTO: 30.3 % (ref 41–52)
HGB BLD-MCNC: 10.4 G/DL (ref 13.5–17.5)
MCH RBC QN AUTO: 32.1 PG (ref 26–34)
MCHC RBC AUTO-ENTMCNC: 34.3 G/DL (ref 32–36)
MCV RBC AUTO: 94 FL (ref 80–100)
NRBC BLD-RTO: 0 /100 WBCS (ref 0–0)
PLATELET # BLD AUTO: 199 X10*3/UL (ref 150–450)
POTASSIUM SERPL-SCNC: 4 MMOL/L (ref 3.5–5.3)
RBC # BLD AUTO: 3.24 X10*6/UL (ref 4.5–5.9)
SODIUM SERPL-SCNC: 135 MMOL/L (ref 136–145)
WBC # BLD AUTO: 14.5 X10*3/UL (ref 4.4–11.3)

## 2024-03-29 PROCEDURE — 36415 COLL VENOUS BLD VENIPUNCTURE: CPT | Performed by: STUDENT IN AN ORGANIZED HEALTH CARE EDUCATION/TRAINING PROGRAM

## 2024-03-29 PROCEDURE — 2500000004 HC RX 250 GENERAL PHARMACY W/ HCPCS (ALT 636 FOR OP/ED): Performed by: STUDENT IN AN ORGANIZED HEALTH CARE EDUCATION/TRAINING PROGRAM

## 2024-03-29 PROCEDURE — 80048 BASIC METABOLIC PNL TOTAL CA: CPT | Performed by: STUDENT IN AN ORGANIZED HEALTH CARE EDUCATION/TRAINING PROGRAM

## 2024-03-29 PROCEDURE — 97116 GAIT TRAINING THERAPY: CPT | Mod: GP

## 2024-03-29 PROCEDURE — 97165 OT EVAL LOW COMPLEX 30 MIN: CPT | Mod: GO | Performed by: OCCUPATIONAL THERAPIST

## 2024-03-29 PROCEDURE — 7100000011 HC EXTENDED STAY RECOVERY HOURLY - NURSING UNIT

## 2024-03-29 PROCEDURE — 2500000002 HC RX 250 W HCPCS SELF ADMINISTERED DRUGS (ALT 637 FOR MEDICARE OP, ALT 636 FOR OP/ED): Performed by: STUDENT IN AN ORGANIZED HEALTH CARE EDUCATION/TRAINING PROGRAM

## 2024-03-29 PROCEDURE — 97161 PT EVAL LOW COMPLEX 20 MIN: CPT | Mod: GP

## 2024-03-29 PROCEDURE — 2500000001 HC RX 250 WO HCPCS SELF ADMINISTERED DRUGS (ALT 637 FOR MEDICARE OP): Performed by: STUDENT IN AN ORGANIZED HEALTH CARE EDUCATION/TRAINING PROGRAM

## 2024-03-29 PROCEDURE — 85027 COMPLETE CBC AUTOMATED: CPT | Performed by: STUDENT IN AN ORGANIZED HEALTH CARE EDUCATION/TRAINING PROGRAM

## 2024-03-29 PROCEDURE — 97535 SELF CARE MNGMENT TRAINING: CPT | Mod: GO | Performed by: OCCUPATIONAL THERAPIST

## 2024-03-29 PROCEDURE — 99232 SBSQ HOSP IP/OBS MODERATE 35: CPT | Performed by: PHYSICIAN ASSISTANT

## 2024-03-29 RX ORDER — ASPIRIN 81 MG/1
81 TABLET ORAL 2 TIMES DAILY
Qty: 60 TABLET | Refills: 0 | Status: SHIPPED | OUTPATIENT
Start: 2024-03-29 | End: 2024-04-28

## 2024-03-29 RX ORDER — DOCUSATE SODIUM 100 MG/1
100 CAPSULE, LIQUID FILLED ORAL 2 TIMES DAILY
Qty: 20 CAPSULE | Refills: 0 | Status: SHIPPED | OUTPATIENT
Start: 2024-03-29 | End: 2024-04-08

## 2024-03-29 RX ORDER — CYCLOBENZAPRINE HCL 10 MG
10 TABLET ORAL 3 TIMES DAILY PRN
Qty: 10 TABLET | Refills: 0 | Status: SHIPPED | OUTPATIENT
Start: 2024-03-29 | End: 2024-04-05 | Stop reason: SDUPTHER

## 2024-03-29 RX ORDER — OXYCODONE HYDROCHLORIDE 5 MG/1
5 TABLET ORAL EVERY 6 HOURS PRN
Qty: 28 TABLET | Refills: 0 | Status: SHIPPED | OUTPATIENT
Start: 2024-03-29 | End: 2024-04-05 | Stop reason: SDUPTHER

## 2024-03-29 RX ADMIN — TRANEXAMIC ACID 1950 MG: 650 TABLET ORAL at 06:07

## 2024-03-29 RX ADMIN — DOCUSATE SODIUM 100 MG: 100 CAPSULE, LIQUID FILLED ORAL at 08:16

## 2024-03-29 RX ADMIN — ACETAMINOPHEN 650 MG: 325 TABLET ORAL at 06:07

## 2024-03-29 RX ADMIN — ACETAMINOPHEN 650 MG: 325 TABLET ORAL at 12:14

## 2024-03-29 RX ADMIN — CYCLOBENZAPRINE 10 MG: 10 TABLET, FILM COATED ORAL at 08:16

## 2024-03-29 RX ADMIN — ASPIRIN 81 MG: 81 TABLET, COATED ORAL at 08:16

## 2024-03-29 RX ADMIN — CEFAZOLIN SODIUM 2 G: 2 INJECTION, SOLUTION INTRAVENOUS at 06:07

## 2024-03-29 RX ADMIN — KETOROLAC TROMETHAMINE 15 MG: 15 INJECTION, SOLUTION INTRAMUSCULAR; INTRAVENOUS at 12:14

## 2024-03-29 RX ADMIN — KETOROLAC TROMETHAMINE 15 MG: 15 INJECTION, SOLUTION INTRAMUSCULAR; INTRAVENOUS at 06:07

## 2024-03-29 ASSESSMENT — COGNITIVE AND FUNCTIONAL STATUS - GENERAL
MOVING TO AND FROM BED TO CHAIR: A LITTLE
DRESSING REGULAR LOWER BODY CLOTHING: A LOT
STANDING UP FROM CHAIR USING ARMS: A LITTLE
HELP NEEDED FOR BATHING: A LITTLE
TOILETING: A LITTLE
MOVING FROM LYING ON BACK TO SITTING ON SIDE OF FLAT BED WITH BEDRAILS: A LITTLE
TURNING FROM BACK TO SIDE WHILE IN FLAT BAD: A LITTLE
WALKING IN HOSPITAL ROOM: A LITTLE
DRESSING REGULAR UPPER BODY CLOTHING: A LITTLE
WALKING IN HOSPITAL ROOM: A LITTLE
MOVING TO AND FROM BED TO CHAIR: A LITTLE
MOBILITY SCORE: 18
CLIMB 3 TO 5 STEPS WITH RAILING: A LITTLE
MOVING FROM LYING ON BACK TO SITTING ON SIDE OF FLAT BED WITH BEDRAILS: A LITTLE
TURNING FROM BACK TO SIDE WHILE IN FLAT BAD: A LITTLE
MOBILITY SCORE: 18
HELP NEEDED FOR BATHING: A LITTLE
DAILY ACTIVITIY SCORE: 21
STANDING UP FROM CHAIR USING ARMS: A LITTLE
DAILY ACTIVITIY SCORE: 19
TOILETING: A LITTLE
DRESSING REGULAR LOWER BODY CLOTHING: A LITTLE
CLIMB 3 TO 5 STEPS WITH RAILING: A LITTLE

## 2024-03-29 ASSESSMENT — PAIN SCALES - GENERAL
PAINLEVEL_OUTOF10: 2
PAINLEVEL_OUTOF10: 5
PAINLEVEL_OUTOF10: 2
PAINLEVEL_OUTOF10: 6
PAINLEVEL_OUTOF10: 6

## 2024-03-29 ASSESSMENT — ACTIVITIES OF DAILY LIVING (ADL)
HOME_MANAGEMENT_TIME_ENTRY: 28
ADL_ASSISTANCE: INDEPENDENT

## 2024-03-29 ASSESSMENT — PAIN - FUNCTIONAL ASSESSMENT
PAIN_FUNCTIONAL_ASSESSMENT: 0-10

## 2024-03-29 NOTE — CARE PLAN
Problem: Hip Replacement Intial Post Op  Goal: Activity/Mobility Safety  Outcome: Progressing  Goal: Treatment Immediate Post Op  Outcome: Progressing  Goal: Treatment  Outcome: Progressing     Problem: Hip Replacement Post Op Day 1  Goal: Activity/Mobility Safety  Outcome: Progressing  Goal: Treatment  Outcome: Progressing     Problem: Hip Replacement Post Op Day 2  Goal: Activity/Mobility Safety  Outcome: Progressing  Goal: Treatment  Outcome: Progressing     Problem: Hip Replacement Post Op Day 3  Goal: Activity/Mobility Safety  Outcome: Progressing  Goal: Treatment  Outcome: Progressing     Problem: Pain  Goal: My pain/discomfort is manageable  Outcome: Progressing     Problem: Safety  Goal: Patient will be injury free during hospitalization  Outcome: Progressing  Goal: I will remain free of falls  Outcome: Progressing     Problem: Daily Care  Goal: Daily care needs are met  Outcome: Progressing     Problem: Psychosocial Needs  Goal: Demonstrates ability to cope with hospitalization/illness  Outcome: Progressing  Goal: Collaborate with me, my family, and caregiver to identify my specific goals  Outcome: Progressing     Problem: Discharge Barriers  Goal: My discharge needs are met  Outcome: Progressing   The patient's goals for the shift include      The clinical goals for the shift include Pain control    Patient remains safe this shift. Ambulating in room and to bathroom with 1 assist and walker. Neurovascular intact. Pain managed with routine pain meds.

## 2024-03-29 NOTE — CARE PLAN
..AO x4, VSS on RA. Adequate output and PO intake. Denies sob, ha, vomiting, cp, n/v/d, numbness and tingling. Reports pain manageable w/PRN meds. Plan of care and AVS reviewed, all questions answered. RLE dressing cdi. IV removed. Pt denies needs ATT, frequent rounding for safety. Adequate for DC home w/wife.

## 2024-03-29 NOTE — PROGRESS NOTES
Physical Therapy    Physical Therapy Evaluation and Treatment    Patient Name: Ramesh Julian  MRN: [id number]  Today's Date: 3/29/2024   Time Calculation  Start Time: 1102  Stop Time: 1125  Time Calculation (min): 23 min    Assessment/Plan   PT Assessment  PT Assessment Results: Decreased strength, Decreased range of motion, Decreased endurance, Impaired balance, Decreased mobility, Pain, Orthopedic restrictions  Rehab Prognosis: Good  Evaluation/Treatment Tolerance: Patient tolerated treatment well  Medical Staff Made Aware: Yes  End of Session Communication: Bedside nurse  Assessment Comment: Pt presents today below baseline level of function and would benefit from continued PT during hospital stay. Pt requires PT at a low intensity level at discharge to maximize functional mobility and safety.    End of Session Patient Position: Up in chair, Alarm on  IP OR SWING BED PT PLAN  Inpatient or Swing Bed: Inpatient  PT Plan  Treatment/Interventions: Bed mobility, Transfer training, Gait training, Balance training, Neuromuscular re-education, Strengthening, Endurance training, Range of motion, Therapeutic exercise, Therapeutic activity, Home exercise program, Stair training  PT Plan: Skilled PT  PT Frequency: BID  PT Discharge Recommendations: Low intensity level of continued care  Equipment Recommended upon Discharge: Wheeled walker  PT Recommended Transfer Status: Assist x1 (FWW, gait belt)  PT - OK to Discharge: Yes (To next level of care when cleared by medical team   )    Subjective       General Visit Information:  General  Reason for Referral: right ARIANNA  Referred By: Vaughn Adler MD  Past Medical History Relevant to Rehab: Admitted 3/28/24 following completion of right ARIANNA. PMH: right hip OA  Family/Caregiver Present: Yes (spouse present)  Prior to Session Communication: Bedside nurse  Patient Position Received: Bed, 3 rail up, Alarm on  Preferred Learning Style: verbal  General Comment: Pt agreeable to PT,  nursing cleared for treatment.    Home Living:  Home Living  Type of Home: House  Lives With: Spouse  Home Adaptive Equipment: Walker rolling or standard, Cane  Home Layout: One level  Home Access: Stairs to enter with rails  Entrance Stairs-Number of Steps: 3  Bathroom Shower/Tub: Tub/shower unit    Prior Level of Function:  Prior Function Per Pt/Caregiver Report  ADL Assistance: Independent  Homemaking Assistance: Independent  Ambulatory Assistance:  (mod I with cane last few weeks)    Precautions:  Precautions  LE Weight Bearing Status: Weight Bearing as Tolerated (right LE)  Medical Precautions: Fall precautions  Post-Surgical Precautions:  (right posterior hip precautions)    Vital Signs:     Objective     Pain:  Pain Assessment  Pain Assessment: 0-10  Pain Score: 6  Pain Type: Surgical pain  Pain Location: Hip  Pain Orientation: Right    Cognition:  Cognition  Overall Cognitive Status: Within Functional Limits  Orientation Level: Oriented X4    General Assessments:      Activity Tolerance  Endurance: Endurance does not limit participation in activity  Sensation  Sensation Comment: denies numbness and tingling              Static Sitting Balance  Static Sitting-Comment/Number of Minutes: good  Dynamic Sitting Balance  Dynamic Sitting-Comments: good  Static Standing Balance  Static Standing-Comment/Number of Minutes: fair  Dynamic Standing Balance  Dynamic Standing-Comments: fair    Functional Assessments:     Bed Mobility  Bed Mobility: No  Transfers  Transfer: Yes  Transfer 1  Transfer From 1: Sit to  Transfer to 1: Stand  Transfer Device 1: Walker  Transfer Level of Assistance 1: Close supervision  Noted that pt favors positioning right LE in abduction in sitting and when transitioning from sit<>stand  Transfers 2  Transfer From 2: Stand to  Transfer to 2: Sit  Transfer Device 2: Walker  Transfer Level of Assistance 2: Close supervision  Ambulation/Gait Training  Ambulation/Gait Training Performed:  Yes  Ambulation/Gait Training 1  Device 1: Rolling walker  Gait Support Devices: Gait belt  Assistance 1: Close supervision  Quality of Gait 1: Inconsistent stride length, Decreased step length (step through gait)  Comments/Distance (ft) 1: 150 feet  Stairs  Stairs: Yes  Stairs  Device 1: Railing  Support Devices 1: Gait belt  Assistance 1: Contact guard  Comment/Number of Steps 1: 3 stairs x 2, first trial with bilateral rails and second trial with rail and cane     Treatment    Cues for safe hand placement with use of FWW for sit<>stand. Instruction provided in gait pattern with cues given for sequencing with FWW. Cues given for turning strategy to avoid pivoting on right LE. Instruction provided in safe stair climbing strategy.     Instruction provided in 10 ankle pumps, quad sets, glute sets, and LAQ's right LE in order to maximize strength and circulation. Cues given for proper technique and parameters.     Extremity/Trunk Assessments:        RLE   RLE : Exceptions to WFL  AROM RLE (degrees)  RLE AROM Comment: WFl except limited at hip  Strength RLE  RLE Overall Strength: Greater than or equal to 3/5 as evidenced by functional mobility  LLE   LLE : Within Functional Limits    Outcome Measures:  [facility] Basic Mobility  Turning from your back to your side while in a flat bed without using bedrails: A little  Moving from lying on your back to sitting on the side of a flat bed without using bedrails: A little  Moving to and from bed to chair (including a wheelchair): A little  Standing up from a chair using your arms (e.g. wheelchair or bedside chair): A little  To walk in hospital room: A little  Climbing 3-5 steps with railing: A little  Basic Mobility - Total Score: 18                            Goals:  Encounter Problems       Encounter Problems (Active)       PT Problem       Pt will perform bed mobility with mod I.        Start:  03/29/24    Expected End:  04/12/24            Pt will complete sit <> stand and  bed <> chair transfers with mod I.        Start:  03/29/24    Expected End:  04/12/24            Pt will ambulate 300 feet mod I using FWW with no significant gait deviations.         Start:  03/29/24    Expected End:  04/12/24            Pt will ascend/descend at least 3 stairs using rail and cane SBA in order to navigate home environment.         Start:  03/29/24    Expected End:  04/12/24            Pt will verbalize and demonstrate understanding of ARIANNA supine/seated exercises.        Start:  03/29/24    Expected End:  04/12/24                     Education Documentation  Precautions, taught by Negin Mcguire, PT at 3/29/2024  2:51 PM.  Learner: Patient  Readiness: Acceptance  Method: Explanation  Response: Verbalizes Understanding, Needs Reinforcement  Comment: Cues for proper use of walker for transfers and ambulation, HEP, safe stair climbing technique    Body Mechanics, taught by Negin Mcguire, PT at 3/29/2024  2:51 PM.  Learner: Patient  Readiness: Acceptance  Method: Explanation  Response: Verbalizes Understanding, Needs Reinforcement  Comment: Cues for proper use of walker for transfers and ambulation, HEP, safe stair climbing technique    Mobility Training, taught by Negin Mcguire, PT at 3/29/2024  2:51 PM.  Learner: Patient  Readiness: Acceptance  Method: Explanation  Response: Verbalizes Understanding, Needs Reinforcement  Comment: Cues for proper use of walker for transfers and ambulation, HEP, safe stair climbing technique    Education Comments  No comments found.

## 2024-03-29 NOTE — PROGRESS NOTES
03/29/24 1039   Discharge Planning   Living Arrangements Spouse/significant other   Support Systems Spouse/significant other   Type of Residence Private residence   Home or Post Acute Services In home services   Type of Home Care Services Home OT;Home PT   Patient expects to be discharged to: Home with [facility]   Patient Choice   Patient / Family choosing to utilize agency / facility established prior to hospitalization Yes     Met with patient at bedside. Admitted for right hip replacement. Pt lives with spouse and was independent PTA with no HHC. Pt has a walker. Pt was able to obtain medications. PCP is Dr Miller. Therapy evals pending. Pt plans to return home with [facility]. Requested internal referral. Family will provide transport home.

## 2024-03-29 NOTE — PROGRESS NOTES
"Ramesh Julian is a 53 y.o. male presenting with Unilateral primary osteoarthritis, right hip.    Subjective   Mr Julian denies chest pain, shortness of breath, nausea.  He is voiding without any issues and is tolerating oral intake.  He reports mild right hip discomfort.  He is looking forward to getting up and working with therapy this morning.       Objective     Physical Exam    Last Recorded Vitals  Blood pressure 143/86, pulse 83, temperature 36.8 °C (98.2 °F), temperature source Temporal, resp. rate 18, height 1.651 m (5' 5\"), weight 90.3 kg (199 lb), SpO2 97 %.  Intake/Output last 3 Shifts:  I/O last 3 completed shifts:  In: 2586.7 (28.7 mL/kg) [P.O.:480; I.V.:1906.7 (21.1 mL/kg); IV Piggyback:200]  Out: 900 (10 mL/kg) [Urine:900 (0.3 mL/kg/hr)]  Weight: 90.3 kg     Relevant Results      Scheduled medications  acetaminophen, 650 mg, oral, q6h TAURUS  aspirin, 81 mg, oral, BID  docusate sodium, 100 mg, oral, BID  ketorolac, 15 mg, intravenous, q6h      Continuous medications  lactated Ringer's, 100 mL/hr, Last Rate: Stopped (03/28/24 1630)  lactated Ringer's, 50 mL/hr, Last Rate: 50 mL/hr (03/29/24 0951)  oxygen, 2 L/min, Last Rate: Stopped (03/28/24 1830)      PRN medications  PRN medications: benzocaine-menthol, bisacodyl, cyclobenzaprine, diphenhydrAMINE, HYDROmorphone, morphine, ondansetron ODT **OR** ondansetron, oxyCODONE, oxyCODONE, prochlorperazine **OR** prochlorperazine **OR** prochlorperazine, sodium phosphates  Results for orders placed or performed during the hospital encounter of 03/28/24 (from the past 24 hour(s))   CBC   Result Value Ref Range    WBC 14.5 (H) 4.4 - 11.3 x10*3/uL    nRBC 0.0 0.0 - 0.0 /100 WBCs    RBC 3.24 (L) 4.50 - 5.90 x10*6/uL    Hemoglobin 10.4 (L) 13.5 - 17.5 g/dL    Hematocrit 30.3 (L) 41.0 - 52.0 %    MCV 94 80 - 100 fL    MCH 32.1 26.0 - 34.0 pg    MCHC 34.3 32.0 - 36.0 g/dL    RDW 12.0 11.5 - 14.5 %    Platelets 199 150 - 450 x10*3/uL   Basic metabolic panel   Result " Value Ref Range    Glucose 115 (H) 74 - 99 mg/dL    Sodium 135 (L) 136 - 145 mmol/L    Potassium 4.0 3.5 - 5.3 mmol/L    Chloride 103 98 - 107 mmol/L    Bicarbonate 22 21 - 32 mmol/L    Anion Gap 14 10 - 20 mmol/L    Urea Nitrogen 10 6 - 23 mg/dL    Creatinine 0.92 0.50 - 1.30 mg/dL    eGFR >90 >60 mL/min/1.73m*2    Calcium 8.4 (L) 8.6 - 10.3 mg/dL               XR hip right with pelvis when performed 1 view    Result Date: 3/28/2024  Interpreted By:  Kyle Nam, STUDY: XR HIP RIGHT WITH PELVIS WHEN PERFORMED 1 VIEW   INDICATION: Signs/Symptoms:Post op hip.   COMPARISON: November 1, 2023   ACCESSION NUMBER(S): [id number]   ORDERING CLINICIAN: JAKE GRIGGS   FINDINGS: Interval right total hip arthroplasty without fracture or periprosthetic lucency. No dislocation.       Satisfactory appearance status post right hip arthroplasty.   Signed by: Kyle Nam 3/28/2024 6:09 PM Dictation workstation:   [id number]                Assessment/Plan   Principal Problem:    Unilateral primary osteoarthritis, right hip  Active Problems:    S/P total right hip arthroplasty    POD #1 s/p 1 ARIANNA  Continue PT/OT, WBAT left  LE; posterior hip precautions  Using incentive spirometer   Reviewed am labs  Multimodal pain regimen  Surgical hip dressing to be removed on post op day #7  VTE prophylaxis: Aspirin 81mg BID X 30 days  When appropriate, will discharge home with [facility]  Follow up with Dr. Griggs in 2 weeks         I spent 25 minutes in the professional and overall care of this patient.      Galilea Ozuna PA-C

## 2024-03-29 NOTE — DISCHARGE SUMMARY
Discharge summary      This patient Ramesh Julian was admitted to the hospital on 3/28/2024  after undergoing Procedure(s) (LRB):  ARTHROPLASTY RIGHT HIP TRISTAN (Right) without complications.    No significant or unexpected findings or abnormal ortho imaging were noted during the hospital stay    Hospital course      Patient tolerated surgical procedure well and there was no complications. Patient progressed adequately through their recovery during hospital stay including PT and rehabilitation.    Patient was then D/C on 3/29/2024 to home  in stable condition.  Patient was instructed on the use of pain medications, the signs and symptoms of infection, and was given our number to call should they have any questions or concerns following discharge.    Based on my clinical judgment, the patient was provided with a 7-day prescription for opioid medication at 30 MED, indicated for treatment of acute pain in the setting of recent right ARIANNA. OARRS report was run and has demonstrated an appropriate time course.  The patient has been provided with counseling pertaining to safe use of opioid medication.    Pertinent Physical Exam At Time of Discharge  Alert, oriented x 3, cooperative with examination.     Examination of the right lower   extremity reveals right hip  dressing is intact without drainage.  No paco-incisional ecchymosis.  EHL, plantarflexion, dorsiflexion are 4+/5.  .  Sensory intact light touch in the deep/superficial/common peroneal, saphenous, tibial, sural nerve distributions.  DP and popliteal pulses are 2+ with capillary refill less than 2 seconds.  Negative Homans' sign.      Home Medications     Medication List      START taking these medications     aspirin 81 mg EC tablet; Take 1 tablet (81 mg) by mouth 2 times a day.   cyclobenzaprine 10 mg tablet; Commonly known as: Flexeril; Take 1 tablet   (10 mg) by mouth 3 times a day as needed for muscle spasms for up to 3   days.   docusate sodium 100 mg  capsule; Commonly known as: Colace; Take 1   capsule (100 mg) by mouth 2 times a day for 10 days.   oxyCODONE 5 mg immediate release tablet; Commonly known as: Roxicodone;   Take 1 tablet (5 mg) by mouth every 6 hours if needed for moderate pain (4   - 6) or severe pain (7 - 10) for up to 7 days.     CONTINUE taking these medications     [facility]   multivitamin tablet     STOP taking these medications     chlorhexidine 0.12 % solution; Commonly known as: Peridex   naproxen 500 mg tablet; Commonly known as: Naprosyn           Patient may bear weight as tolerated to operative extremity with use of walker for assistance with ambulation   Aquacel dressing to be removed pod7 and incision left open to air  Aspirin 81mg BID for DVT prophylaxis started on 3/29 and to be taken for 30  days  Follow up with surgeon in 2 weeks    Radiology images XR hip right with pelvis when performed 1 view    Result Date: 3/28/2024  Interpreted By:  Kyle Nam, STUDY: XR HIP RIGHT WITH PELVIS WHEN PERFORMED 1 VIEW   INDICATION: Signs/Symptoms:Post op hip.   COMPARISON: November 1, 2023   ACCESSION NUMBER(S): [id number]   ORDERING CLINICIAN: JAKE GRIGGS   FINDINGS: Interval right total hip arthroplasty without fracture or periprosthetic lucency. No dislocation.       Satisfactory appearance status post right hip arthroplasty.   Signed by: Kyle Nam 3/28/2024 6:09 PM Dictation workstation:   [id number]       Outpatient Follow-Up  Future Appointments   Date Time Provider Department Center   3/31/2024 To Be Determined Yuliet Jamison, PT [facility]   4/2/2024 To Be Determined Chioma Mckinley, OT [facility]   4/5/2024  3:15 PM Jake Griggs MD [id number] [place]   6/18/2024  3:00 PM Sarah Miller MD [id number] [place]               Galilea Ozuna PA-C

## 2024-03-29 NOTE — PROGRESS NOTES
Occupational Therapy    Evaluation/Treatment    Patient Name: Ramesh Julian  MRN: [id number]  Today's Date: 3/29/2024  Time Calculation  Start Time: 1023  Stop Time: 1106  Time Calculation (min): 43 min  Eval + 2 self care    Assessment  IP OT Assessment  Prognosis: Good  End of Session Communication: Bedside nurse  End of Session Patient Position: Up in chair, Alarm on  Patient presents with decline in ADLs, functional transfers, functional mobility and would benefit from OT during acute stay to improve functional independence and safety. Recommend low intensity OT to maximize functional independence and safety.     Plan:  Treatment Interventions: ADL retraining, Functional transfer training, Patient/family training, Equipment evaluation/education, Compensatory technique education  OT Frequency: Daily  OT Discharge Recommendations: Low intensity level of continued care  Equipment Recommended upon Discharge: Wheeled walker (shower chair, reacher, sock aide, LH shoe horn)  OT - OK to Discharge: Yes from acute care OT services to the next level of care when cleared by medical team      Subjective     Current Problem:  1. Unilateral primary osteoarthritis, right hip        2. Primary osteoarthritis of both hips        3. S/P total right hip arthroplasty  aspirin 81 mg EC tablet    cyclobenzaprine (Flexeril) 10 mg tablet    docusate sodium (Colace) 100 mg capsule    oxyCODONE (Roxicodone) 5 mg immediate release tablet    Referral to Home Health          General:  General  Reason for Referral: right ARIANNA  Referred By: Vaughn Adler MD  Past Medical History Relevant to Rehab: Admitted 3/28/2024 after having right ARIANNA completed by Dr Adler.  Family/Caregiver Present: Yes (spouse present when obtaining PLOF but exited room prior to functional assessment)  Prior to Session Communication: Bedside nurse  Patient Position Received: Bed, 3 rail up, Alarm on  Preferred Learning Style: verbal  General Comment: Patient in long  legged sitting in bed and agreeable to participate in OT evaluation and treatment.    Precautions:  LE Weight Bearing Status: Weight Bearing as Tolerated  Medical Precautions: Fall precautions  Post-Surgical Precautions: Right hip precautions    Pain:  Pain Assessment  Pain Assessment: 0-10  Pain Score: 6  Pain Type: Surgical pain  Pain Location: Hip  Pain Orientation: Right  Pain Interventions: Rest    Objective   Cognition:  Overall Cognitive Status: Within Functional Limits  Orientation Level: Oriented X4    Home Living:  Home Living Comments: Lives in 1 story home with spouse with 3 MONICA with railing.  Has tub shower with no DME.     Prior Function:  Prior Function Comments: Was independent with all ADLs.  Used cane or WW for functional mobility tasks. Spouse is an RN.    ADL:  UE Dressing Assistance: Independent  LE Dressing Assistance: Moderate (don underwear and pants with use of reacher)  ADL Comments: Educated patient on safety and comp strategies for lower body dressing and patient verbalized understanding    Activity Tolerance:  Endurance: Endurance does not limit participation in activity    Bed Mobility/Transfers:   Bed Mobility  Bed Mobility:  (Mod assist supine to sit at edge of bed.)  Transfers  Transfer:  (CGA sit <> stand with min verbal cues for proper hand placement and technique.)  Educated patient on safety with car transfers and patient verbalized understanding.    Educated patient on safety and use of shower chair for safety and patient verbalized understanding. Educated patient on having [facility] assess shower safety prior to completing first shower and patient verbalized understanding.     Ambulation/Gait Training:  Functional Mobility  Functional Mobility Performed:  (CGA with WW functional mobility task in room)    Extremities: RUE   RUE : Within Functional Limits and LUE   LUE: Within Functional Limits    Outcome Measures: [facility] Daily Activity  Putting on and taking off regular lower body  clothing: A lot  Bathing (including washing, rinsing, drying): A little  Putting on and taking off regular upper body clothing: A little  Toileting, which includes using toilet, bedpan or urinal: A little  Taking care of personal grooming such as brushing teeth: None  Eating Meals: None  Daily Activity - Total Score: 19    EDUCATION:  Education  Individual(s) Educated: Patient  Education Provided: Fall precautons (hip precautions, AE for lower body dressing, safety with shower transfers, safety with car transfers)  Patient Response to Education: Patient/Caregiver Verbalized Understanding of Information    Treatment:   Patient in long legged sitting in bed, with spouse at bedside and agreeable to participate in OT evaluation and treatment. Spouse exited room after obtaining PLOF information and before completing functional assessment. Educated patient on hip precautions and patient verbalized understanding.  Mod assist supine to sit at edge of bed. Min assist to complete lower body dressing (don underwear and pants) with use of reacher and comp strategies.  Independent to don pull over shirt. CGA sit <>stand with min verbal cues for proper hand placement and technique. CGA with WW functional mobility task in room. Educated patient on safety and use of shower chair for safety and patient verbalized understanding. Educated patient on having [facility] assess shower safety prior to completing first shower and patient verbalized understanding. Educated patient on safety with car transfers and patient verbalized understanding.  Patient remained seated in bedside chair with bilateral LE's elevated. Call light, phone and tray table in reach. Chair check engaged for safety.       Goals:   Encounter Problems       Encounter Problems (Active)       Dressings Lower Extremities       STG - Patient will complete lower body dressing independently with AE and comp strategies  (Progressing)       Start:  03/29/24    Expected End:  04/12/24                Functional Balance       STG-Patient will be independent with assistive device dynamic stand task >5 minutes for ADL completion   (Progressing)       Start:  03/29/24    Expected End:  04/12/24               Functional Mobility       STG-Patient will be independent with assistive device functional mobility tasks   (Progressing)       Start:  03/29/24    Expected End:  04/12/24               OT Transfers       STG - Patient will perform car transfers independently demonstrating good safety  (Progressing)       Start:  03/29/24    Expected End:  04/12/24            STG-Patient will be independent with functional transfers demonstrating good safety   (Progressing)       Start:  03/29/24    Expected End:  04/12/24               Safety       STG-Patient will independently verbalize hip precautions with all functional tasks   (Progressing)       Start:  03/29/24    Expected End:  04/12/24

## 2024-03-29 NOTE — HH CARE COORDINATION
Home Care received a Referral for Physical Therapy and Occupational Therapy. We have processed the referral for a Start of Care on 03/31/2024.     If you have any questions or concerns, please feel free to contact us at [phone]. Follow the prompts, enter your five digit zip code, and you will be directed to your care team on WE[facility] 1.

## 2024-04-01 ENCOUNTER — HOME CARE VISIT (OUTPATIENT)
Dept: HOME HEALTH SERVICES | Facility: HOME HEALTH | Age: 54
End: 2024-04-01
Payer: COMMERCIAL

## 2024-04-01 VITALS — HEIGHT: 65 IN | WEIGHT: 195 LBS | BODY MASS INDEX: 32.49 KG/M2

## 2024-04-01 PROCEDURE — 0023 HH SOC

## 2024-04-01 PROCEDURE — G0151 HHCP-SERV OF PT,EA 15 MIN: HCPCS

## 2024-04-01 PROCEDURE — G0152 HHCP-SERV OF OT,EA 15 MIN: HCPCS

## 2024-04-01 ASSESSMENT — ENCOUNTER SYMPTOMS
HIGHEST PAIN SEVERITY IN PAST 24 HOURS: 9/10
SUBJECTIVE PAIN PROGRESSION: WAXING AND WANING
PAIN LOCATION - PAIN QUALITY: ACHING
PAIN LOCATION - PAIN FREQUENCY: CONSTANT
LOWEST PAIN SEVERITY IN PAST 24 HOURS: 6/10
PAIN LOCATION - PAIN DURATION: CONSTANT
PAIN: 1
PAIN LOCATION - RELIEVING FACTORS: ICE, MEDS
PERSON REPORTING PAIN: PATIENT
PAIN LOCATION - PAIN QUALITY: ACHE
LOWEST PAIN SEVERITY IN PAST 24 HOURS: 5/10
PAIN LOCATION: RIGHT HIP
PAIN: 1
PAIN LOCATION - PAIN SEVERITY: 7/10
PAIN LOCATION - PAIN SEVERITY: 8/10
PAIN LOCATION - PAIN FREQUENCY: CONSTANT
PAIN SEVERITY GOAL: 2/10
PAIN SEVERITY GOAL: 0/10
PAIN LOCATION - RELIEVING FACTORS: MEDS, ICE
PAIN LOCATION: RIGHT HIP
PERSON REPORTING PAIN: PATIENT
HIGHEST PAIN SEVERITY IN PAST 24 HOURS: 8/10

## 2024-04-01 ASSESSMENT — ACTIVITIES OF DAILY LIVING (ADL)
TOILETING: 1
TOILETING: INDEPENDENT
DRESSING_LB_CURRENT_FUNCTION: MINIMUM ASSIST
CURRENT_FUNCTION: INDEPENDENT
AMBULATION ASSISTANCE: 1
PHYSICAL TRANSFERS ASSESSED: 1
AMBULATION ASSISTANCE: INDEPENDENT

## 2024-04-02 ENCOUNTER — TELEPHONE (OUTPATIENT)
Dept: PHYSICAL THERAPY | Facility: CLINIC | Age: 54
End: 2024-04-02
Payer: COMMERCIAL

## 2024-04-02 SDOH — HEALTH STABILITY: PHYSICAL HEALTH: EXERCISE TYPE: SUPINE, SEATED AND STANDING THR EX'S

## 2024-04-02 ASSESSMENT — GAIT ASSESSMENTS
GAIT SCORE: 4
PATH: 1 - MILD/MODERATE DEVIATION OR USES WALKING AID
WALKING STANCE: 0 - HEELS APART
BALANCE AND GAIT SCORE: 9
STEP SYMMETRY: 0 - RIGHT AND LEFT STEP LENGTH NOT EQUAL
PATH SCORE: 1
TRUNK SCORE: 0
TRUNK: 0 - MARKED SWAY OR USES WALKING AID
INITIATION OF GAIT IMMEDIATELY AFTER GO: 0 - ANY HESITANCY OR MULTIPLE ATTEMPTS TO START
STEP CONTINUITY: 0 - STOPPING OR DISCONTINUITY BETWEEN STEPS

## 2024-04-02 ASSESSMENT — BALANCE ASSESSMENTS
ATTEMPTS TO ARISE: 0 - UNABLE WITHOUT HELP
NUDGED: 0 - BEGINS TO FALL
SITTING DOWN: 1 - USES ARMS OR NOT SMOOTH MOTION
IMMEDIATE STANDING BALANCE FIRST 5 SECONDS: 1 - STEADY BUT USES WALKER OR OTHER SUPPORT
ARISES: 0 - UNABLE WITHOUT HELP
NUDGED SCORE: 0
SITTING BALANCE: 1 - STEADY, SAFE
STANDING BALANCE: 1 - STEADY BUT WIDE STANCE AND USES CANE OR OTHER SUPPORT
TURNING 360 DEGREES STEPS: 0 - DISCONTINUOUS STEPS
BALANCE SCORE: 5
EYES CLOSED AT MAXIMUM POSITION NUDGED: 0 - UNSTEADY
ARISING SCORE: 0

## 2024-04-02 ASSESSMENT — ACTIVITIES OF DAILY LIVING (ADL)
AMBULATION ASSISTANCE: STAND BY ASSIST
ENTERING_EXITING_HOME: ONE PERSON
AMBULATION_DISTANCE/DURATION_TOLERATED: 25'
OASIS_M1830: 04
CURRENT_FUNCTION: STAND BY ASSIST

## 2024-04-02 ASSESSMENT — ENCOUNTER SYMPTOMS
MUSCLE WEAKNESS: 1
LIMITED RANGE OF MOTION: 1
ARTHRALGIAS: 1

## 2024-04-02 NOTE — PROGRESS NOTES
Called patient to discuss outpatient PT. Pt stating he will call back to schedule after he has appointment with Dr. Adler on 4/5/24

## 2024-04-04 ENCOUNTER — HOME CARE VISIT (OUTPATIENT)
Dept: HOME HEALTH SERVICES | Facility: HOME HEALTH | Age: 54
End: 2024-04-04
Payer: COMMERCIAL

## 2024-04-04 PROCEDURE — G0157 HHC PT ASSISTANT EA 15: HCPCS

## 2024-04-04 SDOH — HEALTH STABILITY: PHYSICAL HEALTH: EXERCISE ACTIVITIES SETS: 1

## 2024-04-04 SDOH — HEALTH STABILITY: PHYSICAL HEALTH: PHYSICAL EXERCISE: STANDING

## 2024-04-04 SDOH — HEALTH STABILITY: PHYSICAL HEALTH: PHYSICAL EXERCISE: 15

## 2024-04-04 SDOH — HEALTH STABILITY: PHYSICAL HEALTH: EXERCISE COMMENTS: ALTERNATING LES 10-15 REPS EACH

## 2024-04-04 SDOH — HEALTH STABILITY: PHYSICAL HEALTH: EXERCISE TYPE: STANDING LE STRENGTHENING

## 2024-04-04 SDOH — HEALTH STABILITY: PHYSICAL HEALTH: EXERCISE ACTIVITY: HRTR, MARCHING, HIP ABD, EXT, HAM CURLS, LATERAL WT SHIFTS

## 2024-04-04 ASSESSMENT — ENCOUNTER SYMPTOMS
LOWEST PAIN SEVERITY IN PAST 24 HOURS: 5/10
PAIN LOCATION - PAIN SEVERITY: 8/10
PERSON REPORTING PAIN: PATIENT
PAIN: 1
PAIN LOCATION: RIGHT HIP
HIGHEST PAIN SEVERITY IN PAST 24 HOURS: 8/10

## 2024-04-04 ASSESSMENT — ACTIVITIES OF DAILY LIVING (ADL)
AMBULATION_DISTANCE/DURATION_TOLERATED: 50 FT X2
AMBULATION ASSISTANCE ON FLAT SURFACES: 1

## 2024-04-05 ENCOUNTER — OFFICE VISIT (OUTPATIENT)
Dept: ORTHOPEDIC SURGERY | Facility: CLINIC | Age: 54
End: 2024-04-05
Payer: COMMERCIAL

## 2024-04-05 ENCOUNTER — HOSPITAL ENCOUNTER (OUTPATIENT)
Dept: RADIOLOGY | Facility: CLINIC | Age: 54
Discharge: HOME | End: 2024-04-05
Payer: COMMERCIAL

## 2024-04-05 DIAGNOSIS — Z96.641 S/P TOTAL RIGHT HIP ARTHROPLASTY: ICD-10-CM

## 2024-04-05 DIAGNOSIS — M25.561 RIGHT KNEE PAIN, UNSPECIFIED CHRONICITY: ICD-10-CM

## 2024-04-05 DIAGNOSIS — M25.551 PAIN OF RIGHT HIP: ICD-10-CM

## 2024-04-05 PROCEDURE — 99024 POSTOP FOLLOW-UP VISIT: CPT | Performed by: STUDENT IN AN ORGANIZED HEALTH CARE EDUCATION/TRAINING PROGRAM

## 2024-04-05 PROCEDURE — 73502 X-RAY EXAM HIP UNI 2-3 VIEWS: CPT | Mod: RT

## 2024-04-05 PROCEDURE — 73502 X-RAY EXAM HIP UNI 2-3 VIEWS: CPT | Mod: RIGHT SIDE | Performed by: STUDENT IN AN ORGANIZED HEALTH CARE EDUCATION/TRAINING PROGRAM

## 2024-04-06 RX ORDER — CYCLOBENZAPRINE HCL 10 MG
10 TABLET ORAL 3 TIMES DAILY PRN
Qty: 10 TABLET | Refills: 0 | Status: SHIPPED | OUTPATIENT
Start: 2024-04-06 | End: 2024-04-09

## 2024-04-06 RX ORDER — OXYCODONE HYDROCHLORIDE 5 MG/1
5 TABLET ORAL EVERY 6 HOURS PRN
Qty: 28 TABLET | Refills: 0 | Status: SHIPPED | OUTPATIENT
Start: 2024-04-06 | End: 2024-04-13

## 2024-04-06 NOTE — PROGRESS NOTES
Chief Complaint   Patient presents with    Right Hip - Post-op     Right michelle hip   DOS- 3/28/24 8 days   xray       History of Present Illness  Patient returns today endorsing mild pain.  Denies any numbness or tingling.  No calf pain, No chest pain or shortness of breath.     Exam  Mild swelling thigh  Healthy incision, no erythema or drainage  Tolerates ROM and gentle log roll of hip without issues  + Plantar/dorsiflexion  Negative Stanislav test     X-Ray    XR hip right with pelvis when performed 2 or 3 views    Result Date: 4/5/2024  Interpreted By:  Jake Griggs III, STUDY: XR HIP RIGHT WITH PELVIS WHEN PERFORMED 2 OR 3 VIEWS; ;  4/5/2024 3:54 pm   INDICATION: Signs/Symptoms:pain.   COMPARISON: None.   ACCESSION NUMBER(S): [id number]   ORDERING CLINICIAN: JAKE GRIGGS   FINDINGS: AP pelvis, lateral right hip: Status post right total hip arthroplasty appropriately placed no paco-implant lucency or fracture.       Status post right total hip arthroplasty     MACRO: None   Signed by: Jake Griggs III 4/5/2024 4:11 PM Dictation workstation:   [id number]    XR hip right with pelvis when performed 1 view    Result Date: 3/28/2024  Interpreted By:  Kyle Nam, STUDY: XR HIP RIGHT WITH PELVIS WHEN PERFORMED 1 VIEW   INDICATION: Signs/Symptoms:Post op hip.   COMPARISON: November 1, 2023   ACCESSION NUMBER(S): [id number]   ORDERING CLINICIAN: JAKE GRIGGS   FINDINGS: Interval right total hip arthroplasty without fracture or periprosthetic lucency. No dislocation.       Satisfactory appearance status post right hip arthroplasty.   Signed by: Kyle Nam 3/28/2024 6:09 PM Dictation workstation:   [id number]           Assessment  Patient status post total hip arthroplasty doing well     Plan  Discussed analgesics, encouraging non-opioid modalities.  Encouraged ice, rest.  Discussed hip precautions, DVT prophylaxis, and rehab  Follow-up 6 weeks   XR at follow up 2 views operative hip

## 2024-04-09 ENCOUNTER — HOME CARE VISIT (OUTPATIENT)
Dept: HOME HEALTH SERVICES | Facility: HOME HEALTH | Age: 54
End: 2024-04-09
Payer: COMMERCIAL

## 2024-04-09 ENCOUNTER — TELEPHONE (OUTPATIENT)
Dept: ORTHOPEDIC SURGERY | Facility: CLINIC | Age: 54
End: 2024-04-09
Payer: COMMERCIAL

## 2024-04-09 DIAGNOSIS — M25.561 RIGHT KNEE PAIN, UNSPECIFIED CHRONICITY: ICD-10-CM

## 2024-04-09 DIAGNOSIS — M16.11 PRIMARY OSTEOARTHRITIS OF RIGHT HIP: ICD-10-CM

## 2024-04-09 DIAGNOSIS — Z96.641 S/P TOTAL RIGHT HIP ARTHROPLASTY: ICD-10-CM

## 2024-04-09 PROCEDURE — G0157 HHC PT ASSISTANT EA 15: HCPCS

## 2024-04-09 SDOH — HEALTH STABILITY: PHYSICAL HEALTH: PHYSICAL EXERCISE: STANDING

## 2024-04-09 SDOH — HEALTH STABILITY: PHYSICAL HEALTH: EXERCISE ACTIVITIES SETS: 1

## 2024-04-09 SDOH — HEALTH STABILITY: PHYSICAL HEALTH: PHYSICAL EXERCISE: 15

## 2024-04-09 SDOH — HEALTH STABILITY: PHYSICAL HEALTH: EXERCISE ACTIVITY: HRTR, MARCHING, HIP ABD, EXT, HAM CURLS, SHALLOW SQUATS

## 2024-04-09 SDOH — HEALTH STABILITY: PHYSICAL HEALTH: EXERCISE TYPE: STANDING LE STRENGTHENING

## 2024-04-09 ASSESSMENT — ENCOUNTER SYMPTOMS
HIGHEST PAIN SEVERITY IN PAST 24 HOURS: 9/10
PAIN: 1
LOWEST PAIN SEVERITY IN PAST 24 HOURS: 7/10
PAIN LOCATION - PAIN SEVERITY: 7/10
PERSON REPORTING PAIN: PATIENT
PAIN LOCATION: RIGHT HIP

## 2024-04-09 ASSESSMENT — ACTIVITIES OF DAILY LIVING (ADL): AMBULATION_DISTANCE/DURATION_TOLERATED: 75 FT X2

## 2024-04-11 ENCOUNTER — HOME CARE VISIT (OUTPATIENT)
Dept: HOME HEALTH SERVICES | Facility: HOME HEALTH | Age: 54
End: 2024-04-11
Payer: COMMERCIAL

## 2024-04-11 PROCEDURE — G0157 HHC PT ASSISTANT EA 15: HCPCS | Mod: CQ

## 2024-04-11 SDOH — HEALTH STABILITY: PHYSICAL HEALTH: EXERCISE ACTIVITY: HRTR, MARCHING, HIP ABD, EXT, HAM CURLS, SHALLOW SQUATS

## 2024-04-11 SDOH — HEALTH STABILITY: PHYSICAL HEALTH: PHYSICAL EXERCISE: STANDING

## 2024-04-11 SDOH — HEALTH STABILITY: PHYSICAL HEALTH: EXERCISE TYPE: STANDING LE STRENGTHENING

## 2024-04-11 SDOH — HEALTH STABILITY: PHYSICAL HEALTH: EXERCISE ACTIVITIES SETS: 1

## 2024-04-11 ASSESSMENT — ENCOUNTER SYMPTOMS
SUBJECTIVE PAIN PROGRESSION: GRADUALLY IMPROVING
PAIN LOCATION: RIGHT HIP
PAIN LOCATION - PAIN QUALITY: ACHY
PAIN LOCATION - PAIN SEVERITY: 6/10
PAIN: 1
PERSON REPORTING PAIN: PATIENT

## 2024-04-11 ASSESSMENT — ACTIVITIES OF DAILY LIVING (ADL)
AMBULATION_DISTANCE/DURATION_TOLERATED: 50 FT
AMBULATION ASSISTANCE ON FLAT SURFACES: 1

## 2024-04-15 ENCOUNTER — HOME CARE VISIT (OUTPATIENT)
Dept: HOME HEALTH SERVICES | Facility: HOME HEALTH | Age: 54
End: 2024-04-15
Payer: COMMERCIAL

## 2024-04-15 PROCEDURE — G0157 HHC PT ASSISTANT EA 15: HCPCS | Mod: CQ

## 2024-04-15 SDOH — HEALTH STABILITY: PHYSICAL HEALTH: EXERCISE TYPE: FINALIZED HEP FOR STANDING LE STRENGTHENING

## 2024-04-15 ASSESSMENT — ACTIVITIES OF DAILY LIVING (ADL): AMBULATION ASSISTANCE ON FLAT SURFACES: 1

## 2024-04-15 ASSESSMENT — ENCOUNTER SYMPTOMS
PAIN: 1
PERSON REPORTING PAIN: PATIENT
PAIN LOCATION - PAIN SEVERITY: 7/10
PAIN LOCATION: RIGHT HIP

## 2024-04-18 PROCEDURE — G0180 MD CERTIFICATION HHA PATIENT: HCPCS | Performed by: STUDENT IN AN ORGANIZED HEALTH CARE EDUCATION/TRAINING PROGRAM

## 2024-04-19 ENCOUNTER — HOME CARE VISIT (OUTPATIENT)
Dept: HOME HEALTH SERVICES | Facility: HOME HEALTH | Age: 54
End: 2024-04-19
Payer: COMMERCIAL

## 2024-04-19 VITALS
SYSTOLIC BLOOD PRESSURE: 118 MMHG | TEMPERATURE: 97.4 F | HEART RATE: 81 BPM | OXYGEN SATURATION: 100 % | DIASTOLIC BLOOD PRESSURE: 84 MMHG

## 2024-04-19 PROCEDURE — G0151 HHCP-SERV OF PT,EA 15 MIN: HCPCS

## 2024-04-19 ASSESSMENT — GAIT ASSESSMENTS
WALKING STANCE: 1 - HEELS ALMOST TOUCHING WHILE WALKING
TRUNK SCORE: 0
BALANCE AND GAIT SCORE: 24
TRUNK: 0 - MARKED SWAY OR USES WALKING AID
PATH: 1 - MILD/MODERATE DEVIATION OR USES WALKING AID
STEP CONTINUITY: 1 - STEPS APPEAR CONTINUOUS
GAIT SCORE: 9
INITIATION OF GAIT IMMEDIATELY AFTER GO: 1 - NO HESITANCY
PATH SCORE: 1
STEP SYMMETRY: 1 - RIGHT AND LEFT STEP LENGTH APPEAR EQUAL

## 2024-04-19 ASSESSMENT — BALANCE ASSESSMENTS
STANDING BALANCE: 2 - NARROW STANCE WITHOUT SUPPORT
ATTEMPTS TO ARISE: 2 - ABLE TO RISE, ONE ATTEMPT
TURNING 360 DEGREES STEPS: 1 - CONTINUOUS STEPS
NUDGED: 2 - STEADY
BALANCE SCORE: 15
SITTING DOWN: 2 - SAFE, SMOOTH MOTION
EYES CLOSED AT MAXIMUM POSITION NUDGED: 1 - STEADY
ARISING SCORE: 1
SITTING BALANCE: 1 - STEADY, SAFE
ARISES: 1 - ABLE, USES ARMS TO HELP
NUDGED SCORE: 2
IMMEDIATE STANDING BALANCE FIRST 5 SECONDS: 2 - STEADY WITHOUT WALKER OR OTHER SUPPORT

## 2024-04-19 ASSESSMENT — ACTIVITIES OF DAILY LIVING (ADL)
OASIS_M1830: 01
HOME_HEALTH_OASIS: 00

## 2024-04-19 ASSESSMENT — ENCOUNTER SYMPTOMS
PAIN LOCATION: RIGHT HIP
PAIN LOCATION - PAIN SEVERITY: 6/10
PAIN: 1
PERSON REPORTING PAIN: PATIENT

## 2024-04-23 ENCOUNTER — EVALUATION (OUTPATIENT)
Dept: PHYSICAL THERAPY | Facility: CLINIC | Age: 54
End: 2024-04-23
Payer: COMMERCIAL

## 2024-04-23 DIAGNOSIS — M16.11 PRIMARY OSTEOARTHRITIS OF RIGHT HIP: ICD-10-CM

## 2024-04-23 DIAGNOSIS — Z47.1 AFTERCARE FOLLOWING RIGHT HIP JOINT REPLACEMENT SURGERY: ICD-10-CM

## 2024-04-23 DIAGNOSIS — Z96.641 AFTERCARE FOLLOWING RIGHT HIP JOINT REPLACEMENT SURGERY: ICD-10-CM

## 2024-04-23 PROCEDURE — 97161 PT EVAL LOW COMPLEX 20 MIN: CPT | Mod: GP | Performed by: PHYSICAL THERAPIST

## 2024-04-23 ASSESSMENT — PAIN - FUNCTIONAL ASSESSMENT: PAIN_FUNCTIONAL_ASSESSMENT: 0-10

## 2024-04-23 ASSESSMENT — PAIN SCALES - GENERAL: PAINLEVEL_OUTOF10: 5 - MODERATE PAIN

## 2024-04-23 NOTE — PROGRESS NOTES
Physical Therapy    Physical Therapy Evaluation and Treatment      Patient Name: Ramesh Julian  MRN: [id number]  Today's Date: 4/23/2024  Time Calculation  Start Time: 1305  Stop Time: 1340  Time Calculation (min): 35 min  Insurance:  [facility]  $0 copay, 20% coinsurance  100 V/Y  Visit: 1  POC: 12    Assessment:  54 y/o M presents to outpatient physical therapy with reports of R hip pain approximately 3 1/2 weeks s/p R ARIANNA. The patient presents with the current impairments of pain, decreased ROM, weakness, impaired mobility, impaired balance, and swelling. These impairments currently limit their ability to stand for extended periods of time, ambulate, negotiate stairs, and lift/carry objects. Due to the limitations listed above, the patient is currently at a decreased functional level compared to baseline, and they would benefit from skilled physical therapy to improve pain intensity, strength, flexibility, mobility, and facilitate a safe and efficient return to functional baseline. Patient's prognosis for improvement with therapy is good at this time.  PT Assessment  PT Assessment Results: Decreased strength, Decreased range of motion, Decreased endurance, Decreased mobility, Impaired balance, Pain  Rehab Prognosis: Good  Evaluation/Treatment Tolerance: Patient tolerated treatment well     Plan:  OP PT Plan  Treatment/Interventions: Aquatic therapy, Cryotherapy, Dry needling, Education/ Instruction, Electrical stimulation, Gait training, Hot pack, Manual therapy, Neuromuscular re-education, Self care/ home management, Taping techniques, Therapeutic activities, Therapeutic exercises, Vasopneumatic device  PT Plan: Skilled PT  PT Frequency:  (1-2x/week)  Duration: 11 additional visits  Onset Date: 03/28/24  Certification Period Start Date: 04/23/24  Certification Period End Date: 07/22/24  Rehab Potential: Good  Plan of Care Agreement: Patient    Complexity:  Low    Current Problem:   1. Primary osteoarthritis of right  hip  Referral to Occupational Therapy    Follow Up In Physical Therapy    Referral to Physical Therapy          Subjective    Pt presents to OPPT with complains of R hip pain s/p R ARIANNA on 3/28/24. Since then, the patient reports that the pain has been progressively getting better. He does endorse some swelling in the right lower extremity that has seemed to improve, but he is still having trouble getting shoes on. Pain is a 5/10 at this time, 6/10 at worst in the last two weeks, 5/10 at best. He is ambulatory with a walker when he is outside, but he is ambulatory with a SPC when he is at home. Increased difficulty with walking, standing for extended periods of time, negotiating stairs, and performing transfers. Denies numbness and tingling, but some paresthesia along the incision/scar.  General:  General  Reason for Referral: R hip pain s/p THR 3/28/24  Referred By: Dr. Vaughn Adler  Past Medical History Relevant to Rehab: N/A  Precautions:  Precautions  Precautions Comment: posterior hip precautions x6 weeks per op note on 3/28/24  Pain:  Pain Assessment  Pain Assessment: 0-10  Pain Score: 5 - Moderate pain  Pain Type: Surgical pain  Pain Location: Hip  Pain Orientation: Right  Home Living:  Home Living  Home Living Comment: Patient lives in a one story home with one flight of stairs into the basement, 5 MONICA in the front, 3 MONICA in the back  Prior Level of Function:  Prior Function Per Pt/Caregiver Report  Prior Function Comments: Indpendent with all ADLs and desired activities, but with incresed pain in the right hip    Objective   Hip PROM (*indicates pain):  Flexion R 91, L ~85*    Lumbar AROM:   Not assessed due to hip precautions    LE strength (*indicates pain)  Hip Flexion R 3+/5*, L 4/5  Hip Abduction R 3/5*, L 4/5  Hip Extension R 3+/5*, L 4/5  Knee Flexion R 4/5, L 4+/5  Knee Extension R 4/5, L 4+/5  (Hip extension and hip abduction tested in supine due to difficulty with bed mobility this  date)    Dermatomes: intact  Sit to stand: with bilateral upper extremity support, increased time to complete, difficulty with initiating movement in lower seat skye  Gait: amb with RW, antalgic gait with step to gait pattern, decreased step length on R, decreased stance time on R, decreased salbador  Gait (SPC): pt able to amb with SPC, cueing required for sequencing of the left upper extremity with the right lower extremity   Palpation: mild tenderness surrounding the incision on the right, mild tenderness to the right hip flexor  Observation: incision appears dry and healing well without obvious signs of infection. Incision is closed, but surrounding skin raised in comparison to the incision which could be do to swelling, PT will continue to monitor     Stanislav's negative    Outcome Measures:  Other Measures  Lower Extremity Funtional Score (LEFS): 29/80     Treatments:  Reviewed home health HEP, HEP not updated this date. PT instructs patient to continue with HHPT HEP at this time    EDUCATION:  Outpatient Education  Individual(s) Educated: Patient  Education Provided: Anatomy, Body Mechanics, Fall Risk, Home Safety, Physiology, POC, Signs/Symptoms of Infection  Risk and Benefits Discussed with Patient/Caregiver/Other: yes  Patient/Caregiver Demonstrated Understanding: yes  Plan of Care Discussed and Agreed Upon: yes  Patient Response to Education: Patient/Caregiver Verbalized Understanding of Information, Patient/Caregiver Asked Appropriate Questions    Reviewed and educated patient to continue with current HEP provided by HHPT, PT to update HEP next visit    Goals:  By discharge:  1. Patient will report and demonstrate independence with current HEP  2. Patient will demonstrate the ability to bend and lift 10lb from the floor to waist height without any increase in pain  3. Patient will demonstrate the ability to ascend/descend one flight of stairs reciprocally and without an increase in pain to improve function  within the home and the community  4. Patient will demonstrate gross hip and knee strength >/= 4+/5 to improve the ability to ambulate, squat, and lift without restrictions  5. Patient will demonstrate an increase in Lower Extremity Functional Scale score by 16 points to 45/80 to meet established MCID (baseline 4/23/24 29/80)  6. Patient demonstrate the ability to ambulate >/= 200' independently, pain free, and without any major deviations in gait to improve his mobility within the home and community  7. Patient will report ability to stand x45 min without having to take a break d/t pain to improve their ability to perform tasks inside of the home such as laundry, cooking, and cleaning  8. Patient will report pain of 0/10 at rest, and no greater than 2/10 with any activity including standing, walking, stairs, and bending  9. Patient will demonstrate the ability to perform 15 sit to stand transfers from a normal chair height without pain exceeding 2/10 and without upper extremity assistance

## 2024-04-29 ENCOUNTER — TREATMENT (OUTPATIENT)
Dept: PHYSICAL THERAPY | Facility: CLINIC | Age: 54
End: 2024-04-29
Payer: COMMERCIAL

## 2024-04-29 DIAGNOSIS — M16.11 PRIMARY OSTEOARTHRITIS OF RIGHT HIP: Primary | ICD-10-CM

## 2024-04-29 PROCEDURE — 97110 THERAPEUTIC EXERCISES: CPT | Mod: GP,CQ

## 2024-04-29 ASSESSMENT — PAIN - FUNCTIONAL ASSESSMENT: PAIN_FUNCTIONAL_ASSESSMENT: 0-10

## 2024-04-29 ASSESSMENT — PAIN SCALES - GENERAL: PAINLEVEL_OUTOF10: 5 - MODERATE PAIN

## 2024-04-29 NOTE — PROGRESS NOTES
"Physical Therapy    Physical Therapy Treatment    Patient Name: Ramesh Julian  MRN: [id number]  Today's Date: 4/29/2024  Time Calculation  Start Time: 0934  Stop Time: 1014  Time Calculation (min): 40 min  Insurance:  [facility]  $0 copay, 20% coinsurance  100 V/Y  Visit: 2  POC: 12    Assessment: Improved gait after pt education. Pt muscle fatigue a little quickly. Declined CP- to ice at home. Patient would benefit from P.T. to continue to address impairments in order to improve strength, flexibility, posture, and  to decrease symptoms and increase overall function.    PT Assessment  Evaluation/Treatment Tolerance: Patient tolerated treatment well    Plan: Advance per protocol/ philip.  OP PT Plan  PT Plan: Skilled PT    Current Problem  1. Primary osteoarthritis of right hip  Follow Up In Physical Therapy          General     General  Reason for Referral: R hip pain s/p THR 3/28/24  Referred By: Dr. Vaughn Adler  Past Medical History Relevant to Rehab: N/A    Subjective    Precautions  Precautions  Precautions Comment: posterior hip precautions x6 weeks per op note on 3/28/24       Pain  Pain Assessment  Pain Assessment: 0-10  Pain Score: 5 - Moderate pain    Objective   Decreased stance time on R LE; Vcs to correct.      Treatments:  Recumb Bike (SH:16) x3' L0  DLS march (<90 deg) x10ea  Bridges 2x10  LAQ 3# 2x15  BLKTB HSC x15  Heel slides  x10  GS stretch 3x30\" at step  Hip Stretch 3x30\"   HR/TR x20ea  3 way Hip B x10ea  Standing march 2\"x10ea    OP EDUCATION:   4/29/24: Access Code: [email or web address]: bridges, laq, heel slides, stand march.    Goals:  By discharge:  1. Patient will report and demonstrate independence with current HEP  2. Patient will demonstrate the ability to bend and lift 10lb from the floor to waist height without any increase in pain  3. Patient will demonstrate the ability to ascend/descend one flight of stairs reciprocally and without an increase in pain to improve function within the home " and the community  4. Patient will demonstrate gross hip and knee strength >/= 4+/5 to improve the ability to ambulate, squat, and lift without restrictions  5. Patient will demonstrate an increase in Lower Extremity Functional Scale score by 16 points to 45/80 to meet established MCID (baseline 4/23/24 29/80)  6. Patient demonstrate the ability to ambulate >/= 200' independently, pain free, and without any major deviations in gait to improve his mobility within the home and community  7. Patient will report ability to stand x45 min without having to take a break d/t pain to improve their ability to perform tasks inside of the home such as laundry, cooking, and cleaning  8. Patient will report pain of 0/10 at rest, and no greater than 2/10 with any activity including standing, walking, stairs, and bending  9. Patient will demonstrate the ability to perform 15 sit to stand transfers from a normal chair height without pain exceeding 2/10 and without upper extremity assistance

## 2024-05-01 ENCOUNTER — TREATMENT (OUTPATIENT)
Dept: PHYSICAL THERAPY | Facility: CLINIC | Age: 54
End: 2024-05-01
Payer: COMMERCIAL

## 2024-05-01 DIAGNOSIS — M16.11 PRIMARY OSTEOARTHRITIS OF RIGHT HIP: Primary | ICD-10-CM

## 2024-05-01 PROCEDURE — 97110 THERAPEUTIC EXERCISES: CPT | Mod: GP,CQ

## 2024-05-01 ASSESSMENT — PAIN - FUNCTIONAL ASSESSMENT: PAIN_FUNCTIONAL_ASSESSMENT: 0-10

## 2024-05-01 ASSESSMENT — PAIN SCALES - GENERAL: PAINLEVEL_OUTOF10: 5 - MODERATE PAIN

## 2024-05-01 NOTE — PROGRESS NOTES
"Physical Therapy    Physical Therapy Treatment    Patient Name: Ramesh Julian  MRN: [id number]  Today's Date: 5/1/2024  Time Calculation  Start Time: 0105  Stop Time: 0145  Time Calculation (min): 40 min  Insurance:  [facility]  $0 copay, 20% coinsurance  100 V/Y  Visit: 3  POC: 12    Assessment: Pt had no complaints of increased pain following exercises. Minimal muscle fatigue only as expected. He declined ice. Patient would benefit from P.T. to continue to address impairments in order to improve strength, flexibility, posture, and  to decrease symptoms and increase overall function.         Plan: Advance per protocol/ philip.  OP PT Plan  PT Plan: Skilled PT    Current Problem  1. Primary osteoarthritis of right hip  Follow Up In Physical Therapy            General     General  Reason for Referral: R hip pain s/p THR 3/28/24  Referred By: Dr. Vaughn Adler  Past Medical History Relevant to Rehab: N/A    Subjective    Pt reports soreness in right hip with ambulation/weight bearing but no resting pain. Pt states that he did a lot of walking yesterday.   Precautions  Precautions  STEADI Fall Risk Score (The score of 4 or more indicates an increased risk of falling): 0  Precautions Comment: posterior hip precautions x6 weeks per op note on 3/28/24       Pain  Pain Assessment  Pain Assessment: 0-10  Pain Score: 5 - Moderate pain    Objective     Pt ambulates with standard cane with slight decrease in stance time on right    Treatments:  1:05-1:45 (40')  Recumb Bike (SH:16) x5' L0  DLS march (<90 deg) x15ea  Bridges 2x10  LAQ 3# 2x15 B  BLKTB HSC x15  Heel slides  x10  GS stretch 3x30\" at step  Hip flexor Stretch 3x30\"   HR/TR x20ea  3 way Hip B x15 ea  Standing march 2\"x 10 ea  Side stepping in hallway 20 ft x 2  Dynamic march (<90 deg) in hallway 20 ft x 2  Step ups 4\" x 10      OP EDUCATION:   4/29/24: Access Code: [email or web address]: bridges, laq, heel slides, stand march.    Goals:  By discharge:  1. Patient will report " and demonstrate independence with current HEP  2. Patient will demonstrate the ability to bend and lift 10lb from the floor to waist height without any increase in pain  3. Patient will demonstrate the ability to ascend/descend one flight of stairs reciprocally and without an increase in pain to improve function within the home and the community  4. Patient will demonstrate gross hip and knee strength >/= 4+/5 to improve the ability to ambulate, squat, and lift without restrictions  5. Patient will demonstrate an increase in Lower Extremity Functional Scale score by 16 points to 45/80 to meet established MCID (baseline 4/23/24 29/80)  6. Patient demonstrate the ability to ambulate >/= 200' independently, pain free, and without any major deviations in gait to improve his mobility within the home and community  7. Patient will report ability to stand x45 min without having to take a break d/t pain to improve their ability to perform tasks inside of the home such as laundry, cooking, and cleaning  8. Patient will report pain of 0/10 at rest, and no greater than 2/10 with any activity including standing, walking, stairs, and bending  9. Patient will demonstrate the ability to perform 15 sit to stand transfers from a normal chair height without pain exceeding 2/10 and without upper extremity assistance

## 2024-05-03 ENCOUNTER — OFFICE VISIT (OUTPATIENT)
Dept: ORTHOPEDIC SURGERY | Facility: CLINIC | Age: 54
End: 2024-05-03
Payer: COMMERCIAL

## 2024-05-03 ENCOUNTER — HOSPITAL ENCOUNTER (OUTPATIENT)
Dept: RADIOLOGY | Facility: CLINIC | Age: 54
Discharge: HOME | End: 2024-05-03
Payer: COMMERCIAL

## 2024-05-03 DIAGNOSIS — Z96.641 S/P TOTAL RIGHT HIP ARTHROPLASTY: ICD-10-CM

## 2024-05-03 PROCEDURE — 99024 POSTOP FOLLOW-UP VISIT: CPT | Performed by: STUDENT IN AN ORGANIZED HEALTH CARE EDUCATION/TRAINING PROGRAM

## 2024-05-03 PROCEDURE — 73502 X-RAY EXAM HIP UNI 2-3 VIEWS: CPT | Mod: RIGHT SIDE | Performed by: STUDENT IN AN ORGANIZED HEALTH CARE EDUCATION/TRAINING PROGRAM

## 2024-05-03 PROCEDURE — 73502 X-RAY EXAM HIP UNI 2-3 VIEWS: CPT | Mod: RT

## 2024-05-05 NOTE — PROGRESS NOTES
Chief Complaint   Patient presents with    Right Hip - Follow-up, Post-op     TRISTAN ARIANNA  DOS: 3/28/24 - 5w out  Xrays today       History of Present Illness  Patient returns today endorsing mild pain.  Denies any numbness or tingling.  No calf pain, No chest pain or shortness of breath. Working with physical therapy. Progressing appropriately    Pain improving.  Ambulating with the aid of a cane.     Exam  Mild swelling thigh  Well healed incision, no erythema or drainage  Tolerates ROM and gentle log roll of hip without issues  + Plantar/dorsiflexion  Negative Stanislav test     X-Ray    XR hip right with pelvis when performed 2 or 3 views    Result Date: 5/3/2024  Interpreted By:  Jake Griggs III, STUDY: XR HIP RIGHT WITH PELVIS WHEN PERFORMED 2 OR 3 VIEWS; ;  5/3/2024 3:35 pm   INDICATION: Signs/Symptoms:pain.   COMPARISON: None.   ACCESSION NUMBER(S): [id number]   ORDERING CLINICIAN: JAKE GRIGGS   FINDINGS: Two views right hip: Status post right total hip arthroplasty appropriately placed no paco-implant lucency or fracture.       Status post right total hip arthroplasty     MACRO: None   Signed by: Jake Griggs III 5/3/2024 4:41 PM Dictation workstation:   [id number]    XR hip right with pelvis when performed 2 or 3 views    Result Date: 4/5/2024  Interpreted By:  Jake Griggs III, STUDY: XR HIP RIGHT WITH PELVIS WHEN PERFORMED 2 OR 3 VIEWS; ;  4/5/2024 3:54 pm   INDICATION: Signs/Symptoms:pain.   COMPARISON: None.   ACCESSION NUMBER(S): [id number]   ORDERING CLINICIAN: JAKE GRIGGS   FINDINGS: AP pelvis, lateral right hip: Status post right total hip arthroplasty appropriately placed no paco-implant lucency or fracture.       Status post right total hip arthroplasty     MACRO: None   Signed by: Jake Griggs III 4/5/2024 4:11 PM Dictation workstation:   [id number]       Assessment  Patient status post total hip arthroplasty doing well     Plan  Discussed analgesics, encouraging non-opioid  modalities.  Encouraged ice, rest.  Discussed hip precautions, DVT prophylaxis, and rehab  Follow-up 2 months   XR at follow up 2 views operative hip

## 2024-05-06 ENCOUNTER — TREATMENT (OUTPATIENT)
Dept: PHYSICAL THERAPY | Facility: CLINIC | Age: 54
End: 2024-05-06
Payer: COMMERCIAL

## 2024-05-06 DIAGNOSIS — M16.11 PRIMARY OSTEOARTHRITIS OF RIGHT HIP: Primary | ICD-10-CM

## 2024-05-06 PROCEDURE — 97110 THERAPEUTIC EXERCISES: CPT | Mod: GP,CQ

## 2024-05-06 ASSESSMENT — PAIN - FUNCTIONAL ASSESSMENT: PAIN_FUNCTIONAL_ASSESSMENT: 0-10

## 2024-05-06 ASSESSMENT — PAIN SCALES - GENERAL: PAINLEVEL_OUTOF10: 4

## 2024-05-06 NOTE — PROGRESS NOTES
"Physical Therapy    Physical Therapy Treatment    Patient Name: Ramesh Julian  MRN: [id number]  Today's Date: 5/6/2024  Time Calculation  Start Time: 0320  Stop Time: 0402  Time Calculation (min): 42 min  Insurance:  [facility]  $0 copay, 20% coinsurance  100 V/Y  Visit: 4  POC: 12    Assessment: Pt had no complaints of increased pain following exercises. Minimal muscle fatigue only as expected. He declined ice. Patient would benefit from P.T. to continue to address impairments in order to improve strength, flexibility, posture, and  to decrease symptoms and increase overall function.    PT Assessment  Evaluation/Treatment Tolerance: Patient limited by fatigue    Plan: Advance per protocol/ philip.  OP PT Plan  PT Plan: Skilled PT    Current Problem  1. Primary osteoarthritis of right hip  Follow Up In Physical Therapy            General     General  Reason for Referral: R hip pain s/p THR 3/28/24  Referred By: Dr. Vaughn Adler  Past Medical History Relevant to Rehab: N/A    Subjective    Pt reports soreness in right hip with ambulation/weight bearing but no resting pain.     Precautions  Precautions  STEADI Fall Risk Score (The score of 4 or more indicates an increased risk of falling): 0  Precautions Comment: posterior hip precautions x6 weeks per op note on 3/28/24       Pain  Pain Assessment  Pain Assessment: 0-10  Pain Score: 4    Objective     Pt ambulates with standard cane with slight decrease in stance time on right    Treatments:  Recumb Bike (SH:16) x5' L0  DLS march (<90 deg) x15ea  Bridges 2x10  LAQ 4# 2x15   BLKTB HSC x20  Heel slides  flex x10 and abd 3x5 (AAROM to stretch a little to philip)  GS stretch 3x30\" at step  Hip flexor Stretch 3x30\"   HR/TR x20ea  3 way Hip 2# B x10 ea  Standing march 2# 2\"x 1'  Side stepping in hallway 20 ft x 2  Dynamic march (<90 deg) in hallway 20 ft x 2  Step ups 6\" f/l  x 12ea   Gait tx  [facility] step overs w/SPC 4x4 CGA        OP EDUCATION:   4/29/24: Access Code: " [email or web address]: bridges, laq, heel slides, stand march.    Goals:  By discharge:  1. Patient will report and demonstrate independence with current HEP  2. Patient will demonstrate the ability to bend and lift 10lb from the floor to waist height without any increase in pain  3. Patient will demonstrate the ability to ascend/descend one flight of stairs reciprocally and without an increase in pain to improve function within the home and the community  4. Patient will demonstrate gross hip and knee strength >/= 4+/5 to improve the ability to ambulate, squat, and lift without restrictions  5. Patient will demonstrate an increase in Lower Extremity Functional Scale score by 16 points to 45/80 to meet established MCID (baseline 4/23/24 29/80)  6. Patient demonstrate the ability to ambulate >/= 200' independently, pain free, and without any major deviations in gait to improve his mobility within the home and community  7. Patient will report ability to stand x45 min without having to take a break d/t pain to improve their ability to perform tasks inside of the home such as laundry, cooking, and cleaning  8. Patient will report pain of 0/10 at rest, and no greater than 2/10 with any activity including standing, walking, stairs, and bending  9. Patient will demonstrate the ability to perform 15 sit to stand transfers from a normal chair height without pain exceeding 2/10 and without upper extremity assistance

## 2024-05-08 ENCOUNTER — TREATMENT (OUTPATIENT)
Dept: PHYSICAL THERAPY | Facility: CLINIC | Age: 54
End: 2024-05-08
Payer: COMMERCIAL

## 2024-05-08 DIAGNOSIS — M16.11 PRIMARY OSTEOARTHRITIS OF RIGHT HIP: Primary | ICD-10-CM

## 2024-05-08 PROCEDURE — 97110 THERAPEUTIC EXERCISES: CPT | Mod: GP,CQ

## 2024-05-08 ASSESSMENT — PAIN SCALES - GENERAL: PAINLEVEL_OUTOF10: 0 - NO PAIN

## 2024-05-08 ASSESSMENT — PAIN - FUNCTIONAL ASSESSMENT: PAIN_FUNCTIONAL_ASSESSMENT: 0-10

## 2024-05-08 NOTE — PROGRESS NOTES
"Physical Therapy    Physical Therapy Treatment    Patient Name: Ramesh Julian  MRN: [id number]  Today's Date: 5/8/2024  Time Calculation  Start Time: 0102  Stop Time: 0145  Time Calculation (min): 43 min    Insurance:  [facility]  $0 copay, 20% coinsurance  100 V/Y  Visit: 5  POC: 12    Assessment: Pt had no complaints of increased pain following exercises. Minimal muscle fatigue only as expected. He declined ice. Patient would benefit from P.T. to continue to address impairments in order to improve strength, flexibility, posture, and  to decrease symptoms and increase overall function.    PT Assessment  Evaluation/Treatment Tolerance: Patient limited by fatigue    Plan: Advance per protocol/ philip. Check scar and do scar massage NV if closed up.  OP PT Plan  PT Plan: Skilled PT    Current Problem  1. Primary osteoarthritis of right hip  Follow Up In Physical Therapy          General     General  Reason for Referral: R hip pain s/p THR 3/28/24  Referred By: Dr. Vaughn Adler  Past Medical History Relevant to Rehab: N/A    Subjective    Pt reports his right hip \"is just stiff.\" Notes being able to put his normal shoes on just can't tie his R shoe yet bc it's a little tight yet.    Precautions  Precautions  Precautions Comment: posterior hip precautions x6 weeks per op note on 3/28/24       Pain  Pain Assessment  Pain Assessment: 0-10  Pain Score: 0 - No pain    Objective     Pt ambulates with standard cane with slight decrease in stance time on right.     Treatments:  Recumb Bike (SH:16) x5' L6  DLS march (<90 deg) x15ea nt  DLS walkouts supine x10ea  Bridges  2x10  LAQ 5# 2x20  BLKTB HSC x25  Heel slides flex x10 and abd 2x10 (AAROM to stretch a little to philip)  GS stretch 3x30\" at step  Hip flexor Stretch 3x30\"   HR/TR x20ea HEP   3 way Hip 3# B x12 ea  Mini squats x15  Standing march (<90 deg) 3# 2\"x 1'  Side stepping in hallway 20 ft x 2 nt  Dynamic march (<90 deg) in hallway 20 ft x 2 nt  Step ups 6\" f/l  x " 15ea   Gait tx  [facility] step overs w/SPC 2x4 SBA        OP EDUCATION:   4/29/24: Access Code: [email or web address]: bridges, laq, heel slides, stand march.    Goals:  By discharge:  1. Patient will report and demonstrate independence with current HEP  2. Patient will demonstrate the ability to bend and lift 10lb from the floor to waist height without any increase in pain  3. Patient will demonstrate the ability to ascend/descend one flight of stairs reciprocally and without an increase in pain to improve function within the home and the community  4. Patient will demonstrate gross hip and knee strength >/= 4+/5 to improve the ability to ambulate, squat, and lift without restrictions  5. Patient will demonstrate an increase in Lower Extremity Functional Scale score by 16 points to 45/80 to meet established MCID (baseline 4/23/24 29/80)  6. Patient demonstrate the ability to ambulate >/= 200' independently, pain free, and without any major deviations in gait to improve his mobility within the home and community  7. Patient will report ability to stand x45 min without having to take a break d/t pain to improve their ability to perform tasks inside of the home such as laundry, cooking, and cleaning  8. Patient will report pain of 0/10 at rest, and no greater than 2/10 with any activity including standing, walking, stairs, and bending  9. Patient will demonstrate the ability to perform 15 sit to stand transfers from a normal chair height without pain exceeding 2/10 and without upper extremity assistance

## 2024-05-13 ENCOUNTER — TREATMENT (OUTPATIENT)
Dept: PHYSICAL THERAPY | Facility: CLINIC | Age: 54
End: 2024-05-13
Payer: COMMERCIAL

## 2024-05-13 DIAGNOSIS — M16.11 PRIMARY OSTEOARTHRITIS OF RIGHT HIP: Primary | ICD-10-CM

## 2024-05-13 PROCEDURE — 97110 THERAPEUTIC EXERCISES: CPT | Mod: GP,CQ

## 2024-05-13 ASSESSMENT — PAIN SCALES - GENERAL: PAINLEVEL_OUTOF10: 0 - NO PAIN

## 2024-05-13 ASSESSMENT — PAIN - FUNCTIONAL ASSESSMENT: PAIN_FUNCTIONAL_ASSESSMENT: 0-10

## 2024-05-13 NOTE — PROGRESS NOTES
"Physical Therapy    Physical Therapy Treatment    Patient Name: Ramesh Julian  MRN: [id number]  Today's Date: 5/13/2024  Time Calculation  Start Time: 0102  Stop Time: 0144  Time Calculation (min): 42 min    Insurance:  [facility]  $0 copay, 20% coinsurance  100 V/Y  Visit: 6  POC: 12    Assessment: No more hip precautions as of 5/9/24 per protocol. Pt had no complaints of increased pain during/ following exercises. Minimal muscle fatigue only as expected. He declined ice. Patient would benefit from P.T. to continue to address impairments in order to improve strength, flexibility, posture, and  to decrease symptoms and increase overall function.    PT Assessment  Evaluation/Treatment Tolerance: Patient tolerated treatment well     Plan: Advance per protocol/ philip.  Dr follow up 7/5/24.  OP PT Plan  PT Plan: Skilled PT    Current Problem  1. Primary osteoarthritis of right hip  Follow Up In Physical Therapy            General     General  Reason for Referral: R hip pain s/p THR 3/28/24  Referred By: Dr. Vaughn Adler  Past Medical History Relevant to Rehab: N/A    Subjective    Pt reports his right hip \"is just stiff at my scar.\" Notes his L hip has been bothering him lately.    Precautions  Precautions  STEADI Fall Risk Score (The score of 4 or more indicates an increased risk of falling): 0  Precautions Comment: posterior hip precautions x6 weeks per op note on 3/28/24       Pain  Pain Assessment  Pain Assessment: 0-10  Pain Score: 0 - No pain    Objective     Pt ambulates with standard cane with decreased in stance time on left bc of L hip pain.     Pt education to do scar massage.    Treatments:  Recumb Bike (SH:16) x7' L7  DLS march x15ea  DLS walkouts supine x10ea  Bridges  2x10  LAQ 7.5/ 5# x20ea  BLKTB HSC x25  Heel slides flex x10 and abd 2x10 (AAROM to stretch a little to philip)  GS stretch 3x30\" at step  B Hip flexor Stretch 3x30\"ea  HR/TR x20ea HEP   3 way Hip 3# B x12 ea  Mini squats x15  Standing " "march 3# 2\"x 1' nt  Dynamic march in hallway 20 ft x 2 nt  Step ups 6\" f/l  x 15ea   Gait tx  [facility] step overs w/SPC 2x4 SBA nt      OP EDUCATION:   4/29/24: Access Code: [email or web address]: bridges, laq, heel slides, stand march.    Goals:  By discharge:  1. Patient will report and demonstrate independence with current HEP  2. Patient will demonstrate the ability to bend and lift 10lb from the floor to waist height without any increase in pain  3. Patient will demonstrate the ability to ascend/descend one flight of stairs reciprocally and without an increase in pain to improve function within the home and the community  4. Patient will demonstrate gross hip and knee strength >/= 4+/5 to improve the ability to ambulate, squat, and lift without restrictions  5. Patient will demonstrate an increase in Lower Extremity Functional Scale score by 16 points to 45/80 to meet established MCID (baseline 4/23/24 29/80)  6. Patient demonstrate the ability to ambulate >/= 200' independently, pain free, and without any major deviations in gait to improve his mobility within the home and community  7. Patient will report ability to stand x45 min without having to take a break d/t pain to improve their ability to perform tasks inside of the home such as laundry, cooking, and cleaning  8. Patient will report pain of 0/10 at rest, and no greater than 2/10 with any activity including standing, walking, stairs, and bending  9. Patient will demonstrate the ability to perform 15 sit to stand transfers from a normal chair height without pain exceeding 2/10 and without upper extremity assistance     "

## 2024-05-15 ENCOUNTER — TREATMENT (OUTPATIENT)
Dept: PHYSICAL THERAPY | Facility: CLINIC | Age: 54
End: 2024-05-15
Payer: COMMERCIAL

## 2024-05-15 DIAGNOSIS — M16.11 PRIMARY OSTEOARTHRITIS OF RIGHT HIP: Primary | ICD-10-CM

## 2024-05-15 PROCEDURE — 97110 THERAPEUTIC EXERCISES: CPT | Mod: GP,CQ

## 2024-05-15 ASSESSMENT — PAIN - FUNCTIONAL ASSESSMENT: PAIN_FUNCTIONAL_ASSESSMENT: 0-10

## 2024-05-15 ASSESSMENT — PAIN SCALES - GENERAL: PAINLEVEL_OUTOF10: 0 - NO PAIN

## 2024-05-15 NOTE — PROGRESS NOTES
"Physical Therapy    Physical Therapy Treatment    Patient Name: Ramesh Julian  MRN: [id number]  Today's Date: 5/15/2024  Time Calculation  Start Time: 0104  Stop Time: 0145  Time Calculation (min): 41 min    Insurance:  [facility]  $0 copay, 20% coinsurance  100 V/Y  Visit: 7  POC: 12    Assessment: Decreased overall ther ex in stand secondary to non-surgical hip pain. Pt had no complaints of increased pain during/ following exercises for either hip after modifying ther ex. Minimal muscle fatigue only as expected. He declined ice. Patient would benefit from P.T. to continue to address impairments in order to improve strength, flexibility, posture, and  to decrease symptoms and increase overall function.    PT Assessment  Evaluation/Treatment Tolerance: Patient tolerated treatment well     Plan: Advance per protocol/ pihlip.  Dr follow up 7/5/24.  OP PT Plan  PT Plan: Skilled PT    Current Problem  1. Primary osteoarthritis of right hip  Follow Up In Physical Therapy          General     General  Reason for Referral: R hip pain s/p THR 3/28/24  Referred By: Dr. Vaughn Adler  Past Medical History Relevant to Rehab: N/A      Subjective    Pt reports his right hip \"is just tight at my scar.\" Notes his L hip has been bothering him lately. Thinks he's going to have to get his L hip done soon. Notes not doing scar massage/ ice.    Precautions  Precautions  STEADI Fall Risk Score (The score of 4 or more indicates an increased risk of falling): 0  Precautions Comment: posterior hip precautions x6 weeks per op note on 3/28/24       Pain  Pain Assessment  Pain Assessment: 0-10  Pain Score: 0 - No pain    Objective     Pt ambulates with standard cane with decreased in stance time on left bc of L hip pain.     Pt re-education to ice/ scar massage.      Treatments:  Recumb Bike (SH:16) x6' L7  DLS march x15ea  Bridges  2x10  LAQ 7.5  2 x15ea  BLKTB HSC x25  Heel slides Abd 2x10 (AAROM to stretch a little to philip)  GS stretch " "3x30\" at step nt  B Hip flexor Stretch 3x30\"ea  3 way Hip 3# B x12 ea  Mini squats x15  Standing march 3# 2\"x 1' nt  Dynamic march in hallway 20 ft x 2 nt  Step ups 6\" f/l  x 15ea   Prone Hip ext (knee ext) 3x5 B  Prone hip ABD R 3x5  Hip ABD BTB/ ADD vs PB 5\"x15ea  Scar Massage ~5'      OP EDUCATION:   4/29/24: Access Code: [email or web address]: bridges, laq, heel slides, stand march.    Goals:  By discharge:  1. Patient will report and demonstrate independence with current HEP  2. Patient will demonstrate the ability to bend and lift 10lb from the floor to waist height without any increase in pain  3. Patient will demonstrate the ability to ascend/descend one flight of stairs reciprocally and without an increase in pain to improve function within the home and the community  4. Patient will demonstrate gross hip and knee strength >/= 4+/5 to improve the ability to ambulate, squat, and lift without restrictions  5. Patient will demonstrate an increase in Lower Extremity Functional Scale score by 16 points to 45/80 to meet established MCID (baseline 4/23/24 29/80)  6. Patient demonstrate the ability to ambulate >/= 200' independently, pain free, and without any major deviations in gait to improve his mobility within the home and community  7. Patient will report ability to stand x45 min without having to take a break d/t pain to improve their ability to perform tasks inside of the home such as laundry, cooking, and cleaning  8. Patient will report pain of 0/10 at rest, and no greater than 2/10 with any activity including standing, walking, stairs, and bending  9. Patient will demonstrate the ability to perform 15 sit to stand transfers from a normal chair height without pain exceeding 2/10 and without upper extremity assistance     "

## 2024-05-20 ENCOUNTER — APPOINTMENT (OUTPATIENT)
Dept: PHYSICAL THERAPY | Facility: CLINIC | Age: 54
End: 2024-05-20
Payer: COMMERCIAL

## 2024-05-21 ENCOUNTER — TREATMENT (OUTPATIENT)
Dept: PHYSICAL THERAPY | Facility: CLINIC | Age: 54
End: 2024-05-21
Payer: COMMERCIAL

## 2024-05-21 DIAGNOSIS — M16.11 PRIMARY OSTEOARTHRITIS OF RIGHT HIP: Primary | ICD-10-CM

## 2024-05-21 PROCEDURE — 97110 THERAPEUTIC EXERCISES: CPT | Mod: GP | Performed by: PHYSICAL THERAPIST

## 2024-05-21 ASSESSMENT — PAIN - FUNCTIONAL ASSESSMENT: PAIN_FUNCTIONAL_ASSESSMENT: 0-10

## 2024-05-21 ASSESSMENT — PAIN SCALES - GENERAL: PAINLEVEL_OUTOF10: 3

## 2024-05-21 NOTE — PROGRESS NOTES
Physical Therapy    Physical Therapy Treatment    Patient Name: Ramesh Julian  MRN: [id number]  Today's Date: 5/21/2024  Time Calculation  Start Time: 1602  Stop Time: 1644  Time Calculation (min): 42 min    Insurance:  [facility]  $0 copay, 20% coinsurance  100 V/Y  Visit: 8  POC: 12    Assessment:   Therapeutic exercises performed this date target improving strength and stability of the lower extremities with an emphasis on the right hip. Patient tolerates all treatment well, but reports mild increase in discomfort in the right anterior hip during strengthening. Patient is progressing well in overall function of the right lower extremity, and is more limited due to the non surgical side at this time. PT encourages patient to continue to ice as needed to manage pain. He is progressing well towards his established therapy goals, and remains a good candidate for additional skilled PT.   PT Assessment  Evaluation/Treatment Tolerance: Patient tolerated treatment well     Plan: Advance per protocol/ philip.  Dr follow up 7/5/24.  OP PT Plan  PT Plan: Skilled PT    Current Problem  1. Primary osteoarthritis of right hip  Follow Up In Physical Therapy            General     General  Reason for Referral: R hip pain s/p THR 3/28/24  Referred By: Dr. Vaughn Adler  Past Medical History Relevant to Rehab: N/A      Subjective    Patient reports that his surgical hip isn't too painful at this time, and rates the pain of 3/10. However the non-surgical side is more painful 5/10    Precautions  Precautions  STEADI Fall Risk Score (The score of 4 or more indicates an increased risk of falling): 0  Precautions Comment: posterior hip precautions x6 weeks per op note on 3/28/24       Pain  Pain Assessment  Pain Assessment: 0-10  Pain Score: 3  Pain Location: Hip  Pain Orientation: Right    Objective     Pt ambulates into clinic with SPC in RUE to support LLE, increased lateral sway over the RLE      Treatments:  Recumb Bike (SH:16) x6' L7  DLS  "march x15ea NT  Bridges  2x10  LAQ 7.5   x15ea  BLKTB HSC x25  Heel slides Abd 2x10 (AAROM to stretch a little to philip) NT  GS stretch 3x30\" at step nt  B Hip flexor Stretch 3x30\"ea  3 way Hip 3# B x12 ea  Mini squats x15  Standing march 3# 2\"x 1' nt  Dynamic march in hallway 20 ft x 2 nt  Step ups 6\" f/l  x 15ea  NT  Prone Hip ext (knee ext) 3x5 B NT  Prone hip ABD R 3x5 NT  SLR 2x5  Hip ABD BTB/ ADD vs PB 5\"x20ea    OP EDUCATION:   4/29/24: Access Code: [email or web address]: bridges, laq, heel slides, stand march.    Goals:  By discharge:  1. Patient will report and demonstrate independence with current HEP  2. Patient will demonstrate the ability to bend and lift 10lb from the floor to waist height without any increase in pain  3. Patient will demonstrate the ability to ascend/descend one flight of stairs reciprocally and without an increase in pain to improve function within the home and the community  4. Patient will demonstrate gross hip and knee strength >/= 4+/5 to improve the ability to ambulate, squat, and lift without restrictions  5. Patient will demonstrate an increase in Lower Extremity Functional Scale score by 16 points to 45/80 to meet established MCID (baseline 4/23/24 29/80)  6. Patient demonstrate the ability to ambulate >/= 200' independently, pain free, and without any major deviations in gait to improve his mobility within the home and community  7. Patient will report ability to stand x45 min without having to take a break d/t pain to improve their ability to perform tasks inside of the home such as laundry, cooking, and cleaning  8. Patient will report pain of 0/10 at rest, and no greater than 2/10 with any activity including standing, walking, stairs, and bending  9. Patient will demonstrate the ability to perform 15 sit to stand transfers from a normal chair height without pain exceeding 2/10 and without upper extremity assistance     "

## 2024-05-28 ENCOUNTER — TREATMENT (OUTPATIENT)
Dept: PHYSICAL THERAPY | Facility: CLINIC | Age: 54
End: 2024-05-28
Payer: COMMERCIAL

## 2024-05-28 DIAGNOSIS — M16.11 PRIMARY OSTEOARTHRITIS OF RIGHT HIP: Primary | ICD-10-CM

## 2024-05-28 PROCEDURE — 97110 THERAPEUTIC EXERCISES: CPT | Mod: GP | Performed by: PHYSICAL THERAPIST

## 2024-05-28 ASSESSMENT — PAIN - FUNCTIONAL ASSESSMENT: PAIN_FUNCTIONAL_ASSESSMENT: 0-10

## 2024-05-28 ASSESSMENT — PAIN SCALES - GENERAL: PAINLEVEL_OUTOF10: 3

## 2024-05-28 NOTE — PROGRESS NOTES
Physical Therapy    Physical Therapy Treatment    Patient Name: Ramesh Julian  MRN: [id number]  Today's Date: 5/28/2024  Time Calculation  Start Time: 1349  Stop Time: 1430  Time Calculation (min): 41 min    Insurance:  [facility]  $0 copay, 20% coinsurance  100 V/Y  Visit: 9  POC: 12    Assessment:  Patient tolerates all treatment well this date, but is challenged with new additions to therapeutic exercises. Therapeutic exercises target improving flexibility, strength, and stability of the lower extremity with an emphasis on the right hip joint. Patient is more limited secondary to increased pain in the non-surgical side compared to the surgical side this session. Incision inspected and appears to be healing well this date. He is progressing well towards his established therapy goals at this time, and remains a good candidate for additional skilled PT prior to anticipated discharge in the next 2-3 visits.   PT Assessment  Evaluation/Treatment Tolerance: Patient tolerated treatment well     Plan: Advance per protocol/ philip.  Dr follow up 7/5/24.  OP PT Plan  PT Plan: Skilled PT    Current Problem  1. Primary osteoarthritis of right hip  Follow Up In Physical Therapy    Follow Up In Physical Therapy              General     General  Reason for Referral: R hip pain s/p THR 3/28/24  Referred By: Dr. Vaughn Adler  Past Medical History Relevant to Rehab: N/A      Subjective    Patient reports that he has been feeling ok for the most part. He notes that pain in the surgical side is about a 3/10, but 5/10 on non-surgical side. He reports pain on the surgical side is mostly due to scar irritation.     Precautions  Precautions  STEADI Fall Risk Score (The score of 4 or more indicates an increased risk of falling): 0  Precautions Comment: posterior hip precautions x6 weeks per op note on 3/28/24       Pain  Pain Assessment  Pain Assessment: 0-10  Pain Score: 3  Pain Location: Hip  Pain Orientation: Right    Objective     Incision  "appears dry and healing well at this time without obvious signs of infection      Treatments:  Upright bike SH 6 L0 x5'   DLS march x15ea NT  Bridges  2x10  LAQ 7.5   x15ea  BLKTB HSC x25 NT  Heel slides Abd 2x10 (AAROM to stretch a little to philip) NT  GS stretch 3x30\" at step   B Hip flexor Stretch 3x30\"ea  3 way Hip 3# B x15 ea  Mini squats FT/FA x15 ea   Standing march 3# 2\"x 1' nt  Dynamic march in hallway 20 ft x 2 nt  Step ups 6\" f/l  x 15ea  NT  Prone Hip ext (knee ext) 3x5 B NT  Prone hip ABD R 3x5 NT  SLR 2x5  Hip ABD BTB/ ADD vs PB 5\"x20ea  Clamshells (pillow between knees) x15   Cone taps with fingertip support x12 ea   (41')    OP EDUCATION:   4/29/24: Access Code: [email or web address]: bridges, laq, heel slides, stand march.    Goals:  By discharge:  1. Patient will report and demonstrate independence with current HEP  2. Patient will demonstrate the ability to bend and lift 10lb from the floor to waist height without any increase in pain  3. Patient will demonstrate the ability to ascend/descend one flight of stairs reciprocally and without an increase in pain to improve function within the home and the community  4. Patient will demonstrate gross hip and knee strength >/= 4+/5 to improve the ability to ambulate, squat, and lift without restrictions  5. Patient will demonstrate an increase in Lower Extremity Functional Scale score by 16 points to 45/80 to meet established MCID (baseline 4/23/24 29/80)  6. Patient demonstrate the ability to ambulate >/= 200' independently, pain free, and without any major deviations in gait to improve his mobility within the home and community  7. Patient will report ability to stand x45 min without having to take a break d/t pain to improve their ability to perform tasks inside of the home such as laundry, cooking, and cleaning  8. Patient will report pain of 0/10 at rest, and no greater than 2/10 with any activity including standing, walking, stairs, and bending  9. Patient will " demonstrate the ability to perform 15 sit to stand transfers from a normal chair height without pain exceeding 2/10 and without upper extremity assistance

## 2024-06-03 ENCOUNTER — TREATMENT (OUTPATIENT)
Dept: PHYSICAL THERAPY | Facility: CLINIC | Age: 54
End: 2024-06-03
Payer: COMMERCIAL

## 2024-06-03 DIAGNOSIS — M16.11 PRIMARY OSTEOARTHRITIS OF RIGHT HIP: ICD-10-CM

## 2024-06-03 PROCEDURE — 97530 THERAPEUTIC ACTIVITIES: CPT | Mod: GP

## 2024-06-03 PROCEDURE — 97110 THERAPEUTIC EXERCISES: CPT | Mod: GP

## 2024-06-03 ASSESSMENT — PAIN - FUNCTIONAL ASSESSMENT: PAIN_FUNCTIONAL_ASSESSMENT: 0-10

## 2024-06-03 ASSESSMENT — PAIN SCALES - GENERAL: PAINLEVEL_OUTOF10: 2

## 2024-06-03 NOTE — PROGRESS NOTES
Physical Therapy    Physical Therapy Treatment    Patient Name: Ramesh Julian  MRN: [id number]  Today's Date: 6/3/2024  Time Calculation  Start Time: 1311  Stop Time: 1344  Time Calculation (min): 33 min    Insurance:  [facility]  $0 copay, 20% coinsurance  100 V/Y  Visit: 10  POC: 12    Assessment:  Patient tolerates all treatment well this date. Pt continues to demonstrate deficits in pain management and strength this date with increased difficulty with SLR due to increase in pain . Pt with reports of increase in R hip pain over the incision area with active exercises. Pt continues to demonstrate good potential for therapeutic intervention.        Plan: Advance per protocol/ philip.  Dr follow up 7/5/24.  OP PT Plan  PT Plan: Skilled PT    Current Problem  1. Primary osteoarthritis of right hip  Follow Up In Physical Therapy                General     General  Reason for Referral: R hip pain s/p THR 3/28/24  Referred By: Dr. Vaughn Adler  Past Medical History Relevant to Rehab: N/A      Subjective    Pt reporting to therapy this date ambulating utilizing a SPC. Pt reporting a pain level of 2/10 per numeric scale in the R hip. Pt describing pain as an achy pain.    Precautions  Precautions  Precautions Comment: posterior hip precautions x6 weeks per op note on 3/28/24       Pain  Pain Assessment  Pain Assessment: 0-10  Pain Score: 2  Pain Location: Hip  Pain Orientation: Right    Objective       Treatments:  There ex:  3 way Hip 3#  2 x 15 reps  Squats on blue foam pad 2 x 10 reps with BUE support  Long arc quad w/o added weight 2 x 10 reps  Bent knee fall outs RLE: 2 x 10 reps  Bridges 2 x 10 reps  Hamstring curls 2 x 10 reps green theraband  Lateral step-ups 2 x 10 reps  Forwards step-ups with a focus on the eccentric contraction of the quads 2 x 10 reps    There act:  recumbent bike level 6 for 5 minutes  Calf stretch at 4 inch step 2 x 30 seconds  Hamstring stretch 2 x 30 seconds  SLS 2 x 30 seconds with SBA      OP  EDUCATION:   4/29/24: Access Code: [email or web address]: bridges, laq, heel slides, stand march.    Goals:  By discharge:  1. Patient will report and demonstrate independence with current HEP  2. Patient will demonstrate the ability to bend and lift 10lb from the floor to waist height without any increase in pain  3. Patient will demonstrate the ability to ascend/descend one flight of stairs reciprocally and without an increase in pain to improve function within the home and the community  4. Patient will demonstrate gross hip and knee strength >/= 4+/5 to improve the ability to ambulate, squat, and lift without restrictions  5. Patient will demonstrate an increase in Lower Extremity Functional Scale score by 16 points to 45/80 to meet established MCID (baseline 4/23/24 29/80)  6. Patient demonstrate the ability to ambulate >/= 200' independently, pain free, and without any major deviations in gait to improve his mobility within the home and community  7. Patient will report ability to stand x45 min without having to take a break d/t pain to improve their ability to perform tasks inside of the home such as laundry, cooking, and cleaning  8. Patient will report pain of 0/10 at rest, and no greater than 2/10 with any activity including standing, walking, stairs, and bending  9. Patient will demonstrate the ability to perform 15 sit to stand transfers from a normal chair height without pain exceeding 2/10 and without upper extremity assistance

## 2024-06-11 ENCOUNTER — TREATMENT (OUTPATIENT)
Dept: PHYSICAL THERAPY | Facility: CLINIC | Age: 54
End: 2024-06-11
Payer: COMMERCIAL

## 2024-06-11 DIAGNOSIS — M16.11 PRIMARY OSTEOARTHRITIS OF RIGHT HIP: Primary | ICD-10-CM

## 2024-06-11 PROCEDURE — 97110 THERAPEUTIC EXERCISES: CPT | Mod: GP,CQ

## 2024-06-11 ASSESSMENT — PAIN - FUNCTIONAL ASSESSMENT: PAIN_FUNCTIONAL_ASSESSMENT: 0-10

## 2024-06-11 ASSESSMENT — PAIN SCALES - GENERAL: PAINLEVEL_OUTOF10: 0 - NO PAIN

## 2024-06-11 NOTE — PROGRESS NOTES
Physical Therapy    Physical Therapy Treatment    Patient Name: Ramesh Julian  MRN: [id number]  Today's Date: 6/11/2024  Time Calculation  Start Time: 0315  Stop Time: 0400  Time Calculation (min): 45 min    Insurance:  [facility]  $0 copay, 20% coinsurance  100 V/Y  Visit: 11  POC: 12    Assessment:     Pt had no complaints of increased pain during or after treatment. Pt would benefit from PT to continue to address impairments in order to improve strength, flexibility, gait, and body mechanics and to decrease symptoms and increase overall function.      Plan: Advance per protocol/ philip.  Dr follow up 7/5/24. PT recheck NV  OP PT Plan  PT Plan: Skilled PT    Current Problem  1. Primary osteoarthritis of right hip  Follow Up In Physical Therapy                  General     General  Reason for Referral: R hip pain s/p THR 3/28/24  Referred By: Dr. Vaughn Adler  Past Medical History Relevant to Rehab: N/A      Subjective    Pt denies pain in right hip and states that it is doing well overall. Still having some tingling/discomfort around incision when he touches it or when he sits. He doesn't really notice this discomfort when walking. Left hip has been bothering him more than the right.     Precautions  Precautions  Precautions Comment: posterior hip precautions x6 weeks per op note on 3/28/24       Pain  Pain Assessment  Pain Assessment: 0-10  Pain Score: 0 - No pain  Pain Location: Hip  Pain Orientation: Right    Objective       Treatments:    3 way Hip 3#  2 x 15 reps  Squats on blue foam pad 2 x 10 reps with BUE support  Long arc quad 0# 2 x 10 reps  Bent knee fall outs RLE:  x 10 reps  Bridges x 10 reps  Hamstring curls 2 x 10 reps green theraband  Lateral step-ups 2 x 15 reps  Forwards step-ups with a focus on the eccentric contraction of the quads x 15 reps  recumbent bike level 6 for 5 minutes  Calf stretch at 4 inch step 2 x 30 seconds  Hamstring stretch 2 x 30 seconds  SLS 2 x 30 seconds with SBA NT      OP  EDUCATION:   4/29/24: Access Code: [email or web address]: bridges, laq, heel slides, stand march.    Goals:  By discharge:  1. Patient will report and demonstrate independence with current HEP  2. Patient will demonstrate the ability to bend and lift 10lb from the floor to waist height without any increase in pain  3. Patient will demonstrate the ability to ascend/descend one flight of stairs reciprocally and without an increase in pain to improve function within the home and the community  4. Patient will demonstrate gross hip and knee strength >/= 4+/5 to improve the ability to ambulate, squat, and lift without restrictions  5. Patient will demonstrate an increase in Lower Extremity Functional Scale score by 16 points to 45/80 to meet established MCID (baseline 4/23/24 29/80)  6. Patient demonstrate the ability to ambulate >/= 200' independently, pain free, and without any major deviations in gait to improve his mobility within the home and community  7. Patient will report ability to stand x45 min without having to take a break d/t pain to improve their ability to perform tasks inside of the home such as laundry, cooking, and cleaning  8. Patient will report pain of 0/10 at rest, and no greater than 2/10 with any activity including standing, walking, stairs, and bending  9. Patient will demonstrate the ability to perform 15 sit to stand transfers from a normal chair height without pain exceeding 2/10 and without upper extremity assistance

## 2024-06-18 ENCOUNTER — APPOINTMENT (OUTPATIENT)
Dept: PRIMARY CARE | Facility: CLINIC | Age: 54
End: 2024-06-18
Payer: COMMERCIAL

## 2024-06-18 ENCOUNTER — LAB (OUTPATIENT)
Dept: LAB | Facility: LAB | Age: 54
End: 2024-06-18
Payer: COMMERCIAL

## 2024-06-18 VITALS
HEART RATE: 68 BPM | WEIGHT: 192 LBS | BODY MASS INDEX: 31.99 KG/M2 | DIASTOLIC BLOOD PRESSURE: 74 MMHG | HEIGHT: 65 IN | SYSTOLIC BLOOD PRESSURE: 128 MMHG | TEMPERATURE: 97.8 F

## 2024-06-18 DIAGNOSIS — R53.83 FATIGUE, UNSPECIFIED TYPE: ICD-10-CM

## 2024-06-18 DIAGNOSIS — Z00.00 ANNUAL PHYSICAL EXAM: Primary | ICD-10-CM

## 2024-06-18 DIAGNOSIS — Z12.5 PROSTATE CANCER SCREENING: ICD-10-CM

## 2024-06-18 DIAGNOSIS — E78.2 MIXED HYPERLIPIDEMIA: ICD-10-CM

## 2024-06-18 LAB
ALBUMIN SERPL BCP-MCNC: 4.2 G/DL (ref 3.4–5)
ALP SERPL-CCNC: 96 U/L (ref 33–120)
ALT SERPL W P-5'-P-CCNC: 41 U/L (ref 10–52)
ANION GAP SERPL CALC-SCNC: 11 MMOL/L (ref 10–20)
AST SERPL W P-5'-P-CCNC: 25 U/L (ref 9–39)
BASOPHILS # BLD AUTO: 0.1 X10*3/UL (ref 0–0.1)
BASOPHILS NFR BLD AUTO: 1.4 %
BILIRUB SERPL-MCNC: 0.6 MG/DL (ref 0–1.2)
BUN SERPL-MCNC: 11 MG/DL (ref 6–23)
CALCIUM SERPL-MCNC: 9.4 MG/DL (ref 8.6–10.3)
CHLORIDE SERPL-SCNC: 102 MMOL/L (ref 98–107)
CHOLEST SERPL-MCNC: 159 MG/DL (ref 0–199)
CHOLESTEROL/HDL RATIO: 3.2
CO2 SERPL-SCNC: 28 MMOL/L (ref 21–32)
CREAT SERPL-MCNC: 0.89 MG/DL (ref 0.5–1.3)
EGFRCR SERPLBLD CKD-EPI 2021: >90 ML/MIN/1.73M*2
EOSINOPHIL # BLD AUTO: 0.37 X10*3/UL (ref 0–0.7)
EOSINOPHIL NFR BLD AUTO: 5 %
ERYTHROCYTE [DISTWIDTH] IN BLOOD BY AUTOMATED COUNT: 14 % (ref 11.5–14.5)
GLUCOSE SERPL-MCNC: 98 MG/DL (ref 74–99)
HCT VFR BLD AUTO: 42 % (ref 41–52)
HDLC SERPL-MCNC: 49.6 MG/DL
HGB BLD-MCNC: 14.4 G/DL (ref 13.5–17.5)
IMM GRANULOCYTES # BLD AUTO: 0.07 X10*3/UL (ref 0–0.7)
IMM GRANULOCYTES NFR BLD AUTO: 1 % (ref 0–0.9)
LDLC SERPL CALC-MCNC: ABNORMAL MG/DL
LYMPHOCYTES # BLD AUTO: 1.55 X10*3/UL (ref 1.2–4.8)
LYMPHOCYTES NFR BLD AUTO: 21.1 %
MCH RBC QN AUTO: 30.2 PG (ref 26–34)
MCHC RBC AUTO-ENTMCNC: 34.3 G/DL (ref 32–36)
MCV RBC AUTO: 88 FL (ref 80–100)
MONOCYTES # BLD AUTO: 0.72 X10*3/UL (ref 0.1–1)
MONOCYTES NFR BLD AUTO: 9.8 %
NEUTROPHILS # BLD AUTO: 4.55 X10*3/UL (ref 1.2–7.7)
NEUTROPHILS NFR BLD AUTO: 61.7 %
NON HDL CHOLESTEROL: 109 MG/DL (ref 0–149)
NRBC BLD-RTO: 0 /100 WBCS (ref 0–0)
PLATELET # BLD AUTO: 289 X10*3/UL (ref 150–450)
POTASSIUM SERPL-SCNC: 3.9 MMOL/L (ref 3.5–5.3)
PROT SERPL-MCNC: 7.5 G/DL (ref 6.4–8.2)
PSA SERPL-MCNC: 0.51 NG/ML
RBC # BLD AUTO: 4.77 X10*6/UL (ref 4.5–5.9)
SODIUM SERPL-SCNC: 137 MMOL/L (ref 136–145)
TRIGL SERPL-MCNC: 433 MG/DL (ref 0–149)
VLDL: ABNORMAL
WBC # BLD AUTO: 7.4 X10*3/UL (ref 4.4–11.3)

## 2024-06-18 PROCEDURE — 4004F PT TOBACCO SCREEN RCVD TLK: CPT | Performed by: INTERNAL MEDICINE

## 2024-06-18 PROCEDURE — 80061 LIPID PANEL: CPT

## 2024-06-18 PROCEDURE — 83721 ASSAY OF BLOOD LIPOPROTEIN: CPT

## 2024-06-18 PROCEDURE — 36415 COLL VENOUS BLD VENIPUNCTURE: CPT

## 2024-06-18 PROCEDURE — 84153 ASSAY OF PSA TOTAL: CPT

## 2024-06-18 PROCEDURE — 99396 PREV VISIT EST AGE 40-64: CPT | Performed by: INTERNAL MEDICINE

## 2024-06-18 PROCEDURE — 80053 COMPREHEN METABOLIC PANEL: CPT

## 2024-06-18 PROCEDURE — 85025 COMPLETE CBC W/AUTO DIFF WBC: CPT

## 2024-06-18 ASSESSMENT — ENCOUNTER SYMPTOMS
GASTROINTESTINAL NEGATIVE: 1
RESPIRATORY NEGATIVE: 1
CARDIOVASCULAR NEGATIVE: 1
NEUROLOGICAL NEGATIVE: 1
CONSTITUTIONAL NEGATIVE: 1

## 2024-06-18 NOTE — PROGRESS NOTES
"Subjective   Patient ID: Ramesh Julian is a 53 y.o. male who presents for Annual Exam (Pt here for yearly physical and is S/P rt hip replacement).    HPI   Patient here for wellness exam.  He had hip surgery done in February and is recovering from it he is contemplative left hip surgery now after that he will consider getting colonoscopy for colon cancer screening  Review of Systems   Constitutional: Negative.    HENT: Negative.     Respiratory: Negative.     Cardiovascular: Negative.    Gastrointestinal: Negative.    Genitourinary: Negative.    Musculoskeletal:         See HPI   Neurological: Negative.    All other systems reviewed and are negative.      Objective   /74   Pulse 68   Temp 36.6 °C (97.8 °F) (Temporal)   Ht 1.651 m (5' 5\")   Wt 87.1 kg (192 lb)   BMI 31.95 kg/m²     Physical Exam  Vitals reviewed.   Constitutional:       Appearance: Normal appearance.   HENT:      Head: Normocephalic.   Eyes:      Extraocular Movements: Extraocular movements intact.      Pupils: Pupils are equal, round, and reactive to light.   Pulmonary:      Effort: Pulmonary effort is normal.      Breath sounds: Normal breath sounds. No rales.   Neurological:      Mental Status: He is alert.         Assessment/Plan   Problem List Items Addressed This Visit             ICD-10-CM    Annual physical exam - Primary Z00.00     Other Visit Diagnoses         Codes    Mixed hyperlipidemia     E78.2    Relevant Orders    Comprehensive Metabolic Panel (Completed)    Lipid Panel (Completed)    Cholesterol, LDL Direct    Fatigue, unspecified type     R53.83    Relevant Orders    CBC and Auto Differential (Completed)    Prostate cancer screening     Z12.5    Relevant Orders    Prostate Specific Antigen (Completed)               "

## 2024-06-19 LAB — LDLC SERPL DIRECT ASSAY-MCNC: 60 MG/DL (ref 0–129)

## 2024-06-25 ENCOUNTER — TREATMENT (OUTPATIENT)
Dept: PHYSICAL THERAPY | Facility: CLINIC | Age: 54
End: 2024-06-25
Payer: COMMERCIAL

## 2024-06-25 DIAGNOSIS — M16.11 PRIMARY OSTEOARTHRITIS OF RIGHT HIP: Primary | ICD-10-CM

## 2024-06-25 PROCEDURE — 97110 THERAPEUTIC EXERCISES: CPT | Mod: GP | Performed by: PHYSICAL THERAPIST

## 2024-06-25 ASSESSMENT — PAIN - FUNCTIONAL ASSESSMENT: PAIN_FUNCTIONAL_ASSESSMENT: 0-10

## 2024-06-25 ASSESSMENT — PAIN SCALES - GENERAL: PAINLEVEL_OUTOF10: 2

## 2024-06-25 NOTE — PROGRESS NOTES
Physical Therapy    Physical Therapy Discharge    Patient Name: Ramesh Julian  MRN: [id number]  Today's Date: 6/25/2024  Time Calculation  Start Time: 1520  Stop Time: 1559  Time Calculation (min): 39 min  PT Therapeutic Procedures Time Entry  Therapeutic Exercise Time Entry: 39      Insurance:  [facility]  $0 copay, 20% coinsurance  100 V/Y  Visit: 12  POC: 12    Assessment:  Patient has completed 12 therapy visits up to this point, and have met 6/9 established therapy goals. The patient has progressed well, and has shown improvements in pain intensity, strength, flexibility, and mobility. At this time, the patient remains below functional baseline with intermittent pain, weakness of the lower extremities, and decreased mobility. At this time, the patient is more restricted in his overall function and mobility due to the contralateral side rather than his surgical side, which makes accurate assessment of goals difficult this date. PT anticipates additional progression in remaining areas on the right hip with adherence to HEP. Patient is discharged from formal physical therapy at this time, with instructions to continue with HEP, and follow up with physician should their status change.  PT Assessment  Assessment Comment: discharge to HEP   Plan:   OP PT Plan  PT Plan: No Additional PT interventions required at this time    Current Problem  1. Primary osteoarthritis of right hip  Follow Up In Physical Therapy                    General     General  Reason for Referral: R hip pain s/p THR 3/28/24  Referred By: Dr. Vaughn Adler  Past Medical History Relevant to Rehab: N/A      Subjective    Patient reports that pain in the right hip isnt too bad, but he still gets pain along the incision on the right. Pain is noted of 2/10 at this time, and 4/10 at worst in the last two weeks. He reports that he can stand for >45 minutes but may have to lean on something after awhile due to the left hip pain.      Precautions  Precautions  Precautions Comment: posterior hip precautions x6 weeks per op note on 3/28/24       Pain  Pain Assessment  Pain Assessment: 0-10  0-10 (Numeric) Pain Score: 2  Pain Location: Hip  Pain Orientation: Right    Objective     LE strength (*indicates pain) L from IE   Hip Flexion R 4/5, L 4/5  Hip Abduction R 4/5, L 4/5  Hip Extension R 4/5, L 4/5  Knee Flexion R 4/5, L 4+/5  Knee Extension R 4/5, L 4+/5      LEFS: 60/80    Stairs: pt able to ascend and descend one flight of stairs reciprocally, but increased pain and impaired mechanics due to the LLE rather than the right    Gait: pt amb with a SPC in the RUE. Able to amb without SPC, but only able to ambulate ~50-60' prior to breakdown in mechanics due to pain in the LLE    Sit to stands: pt able to perform 10 sit to stand transfers from a chair without upper extremity assistance, but terminated due to pain in the left hip rather than the right    Lifting: pt able to lift 10lb from the floor without an increase in pain in the right hip    Treatments:  Therapeutic exercises:  Obj measures and goals review  3 way Hip 3#  2 x 15 reps  Squats on blue foam pad 2 x 10 reps with BUE support  Long arc quad 0# 2 x 10 reps NT  Bent knee fall outs RLE:  x 10 reps NT  Bridges x 10 reps NT  Hamstring curls 2 x 10 reps green theraband NT  Lateral step-ups 2 x 15 reps NT  Forwards step-ups with a focus on the eccentric contraction of the quads x 15 reps NT  recumbent bike level 6 for 5 minutes  Calf stretch at 4 inch step 2 x 30 seconds NT  Hamstring stretch 2 x 30 seconds B  SLS 2 x 30 seconds with SBA NT\  Standing march 2x10 on R  (39')    OP EDUCATION:   4/29/24: Access Code: [email or web address]: jeffrey gabriel, heel slides, stand march.    6/25/24:  Exercises  - Standing March with Counter Support  - 1 x daily - 7 x weekly - 2 sets - 10 reps  - Standing Hip Flexion with Counter Support  - 1 x daily - 7 x weekly - 2 sets - 10 reps    Goals:  By discharge:  1.  Patient will report and demonstrate independence with current HEP MET  2. Patient will demonstrate the ability to bend and lift 10lb from the floor to waist height without any increase in pain  3. Patient will demonstrate the ability to ascend/descend one flight of stairs reciprocally and without an increase in pain to improve function within the home and the community MET  4. Patient will demonstrate gross hip and knee strength >/= 4+/5 to improve the ability to ambulate, squat, and lift without restrictions PARTIALLY MET  5. Patient will demonstrate an increase in Lower Extremity Functional Scale score by 16 points to 45/80 to meet established MCID (baseline 4/23/24 29/80) MET  6. Patient demonstrate the ability to ambulate >/= 200' independently, pain free, and without any major deviations in gait to improve his mobility within the home and community NOT MET  7. Patient will report ability to stand x45 min without having to take a break d/t pain to improve their ability to perform tasks inside of the home such as laundry, cooking, and cleaning MET  8. Patient will report pain of 0/10 at rest, and no greater than 2/10 with any activity including standing, walking, stairs, and bending NOT MET  9. Patient will demonstrate the ability to perform 15 sit to stand transfers from a normal chair height without pain exceeding 2/10 and without upper extremity assistance MET

## 2024-07-05 ENCOUNTER — APPOINTMENT (OUTPATIENT)
Dept: ORTHOPEDIC SURGERY | Facility: CLINIC | Age: 54
End: 2024-07-05
Payer: COMMERCIAL

## 2024-07-08 ENCOUNTER — OFFICE VISIT (OUTPATIENT)
Dept: ORTHOPEDIC SURGERY | Facility: CLINIC | Age: 54
End: 2024-07-08
Payer: COMMERCIAL

## 2024-07-08 ENCOUNTER — HOSPITAL ENCOUNTER (OUTPATIENT)
Dept: RADIOLOGY | Facility: CLINIC | Age: 54
Discharge: HOME | End: 2024-07-08
Payer: COMMERCIAL

## 2024-07-08 DIAGNOSIS — Z96.641 S/P TOTAL RIGHT HIP ARTHROPLASTY: ICD-10-CM

## 2024-07-08 PROCEDURE — 73502 X-RAY EXAM HIP UNI 2-3 VIEWS: CPT | Mod: RT

## 2024-07-08 PROCEDURE — 99211 OFF/OP EST MAY X REQ PHY/QHP: CPT | Performed by: STUDENT IN AN ORGANIZED HEALTH CARE EDUCATION/TRAINING PROGRAM

## 2024-07-08 NOTE — PROGRESS NOTES
Chief Complaint   Patient presents with    Right Hip - Follow-up     RT TRISTAN THR 3/28/24, 3 months out, with x-rays today     Ramesh is a 53-year-old male presenting today for 3-month follow-up of right total hip arthroplasty 3/28/2024.  Patient presents today endorsing only minimal pain deep to the incision sites.  Has been increasing activities with no real issues.  Has graduated from physical therapy as he was making significant progress and has returned to daily activities.  Denies any numbness or tingling to the distal lower extremity.  Endorsing occasional soreness at the end of the day when he is particularly active.  Does state that he has had increased pain in his left hip most likely due to compensation of his weight in his recovery following surgery.  Currently ambulating with the assistance of a cane due to this mostly left sided hip pain.    Physical examination:    Examination of the side: right hip  The incision is healing well  No erythema or warmth.  Range of motion: Forward Flexion: 110   The Patient can single leg stand and actively abduct  Calf is soft, Homans negative  The patient has intact ankle dorsiflexion and plantarflexion.    Radiographs:    2 view right hip radiographs were ordered, performed, interpreted today displaying patient status post total hip arthroplasty.  Hardware in appropriate position.  No evidence of hardware failure or loosening.  No periprosthetic fracture or lucency appreciated.  No other acute osseous abnormality appreciated.    XR hip right with pelvis when performed 2 or 3 views  Narrative: Interpreted By:  Jake Griggs III,   STUDY:  XR HIP RIGHT WITH PELVIS WHEN PERFORMED 2 OR 3 VIEWS; ;  5/3/2024  3:35 pm      INDICATION:  Signs/Symptoms:pain.      COMPARISON:  None.      ACCESSION NUMBER(S):  [id number]      ORDERING CLINICIAN:  JAKE GRIGGS      FINDINGS:  Two views right hip: Status post right total hip arthroplasty  appropriately placed no  paco-implant lucency or fracture.      Impression: Status post right total hip arthroplasty          MACRO:  None      Signed by: Vaughn Adler III 5/3/2024 4:41 PM  Dictation workstation:   [id number]        Impression:  53-year-old male status post side: right total hip arthroplasty, 3 months out    Plan:  Discussed return to activities, activities to avoid as well as importance of using pain as a guide  Discussed the continued importance of prophylactic dental antibiotics  Discussed outpatient physical therapy as well as at home exercises  Follow up: 2 months for reevaluation of postoperative right hip as well as left hip  All questions answered    Harjit Simmons PA-C

## 2024-09-09 ENCOUNTER — HOSPITAL ENCOUNTER (OUTPATIENT)
Dept: RADIOLOGY | Facility: CLINIC | Age: 54
Discharge: HOME | End: 2024-09-09
Payer: COMMERCIAL

## 2024-09-09 ENCOUNTER — OFFICE VISIT (OUTPATIENT)
Dept: ORTHOPEDIC SURGERY | Facility: CLINIC | Age: 54
End: 2024-09-09
Payer: COMMERCIAL

## 2024-09-09 DIAGNOSIS — M25.552 BILATERAL HIP PAIN: ICD-10-CM

## 2024-09-09 DIAGNOSIS — M25.551 BILATERAL HIP PAIN: ICD-10-CM

## 2024-09-09 PROCEDURE — 99214 OFFICE O/P EST MOD 30 MIN: CPT | Mod: 57 | Performed by: STUDENT IN AN ORGANIZED HEALTH CARE EDUCATION/TRAINING PROGRAM

## 2024-09-09 PROCEDURE — 73522 X-RAY EXAM HIPS BI 3-4 VIEWS: CPT

## 2024-09-09 PROCEDURE — 99214 OFFICE O/P EST MOD 30 MIN: CPT | Performed by: STUDENT IN AN ORGANIZED HEALTH CARE EDUCATION/TRAINING PROGRAM

## 2024-09-09 PROCEDURE — 4004F PT TOBACCO SCREEN RCVD TLK: CPT | Performed by: STUDENT IN AN ORGANIZED HEALTH CARE EDUCATION/TRAINING PROGRAM

## 2024-09-09 PROCEDURE — 73522 X-RAY EXAM HIPS BI 3-4 VIEWS: CPT | Mod: BILATERAL PROCEDURE | Performed by: STUDENT IN AN ORGANIZED HEALTH CARE EDUCATION/TRAINING PROGRAM

## 2024-09-09 NOTE — PROGRESS NOTES
Chief Complaint   Patient presents with    Right Hip - Follow-up     RT TRISTAN THR 3/28/24, 3 months out, with x-rays today       Patient presents today for follow-up regarding his right total hip arthroplasty.  Feels like he is making significant progress.  Per wife he is constantly on the go doing more more every day.  Does have some residual thigh pain.  Some days better than others.  Also does have some laterally based hip pain as well.  Presents today for discussion of his left hip has he does wish to proceed with left total hip arthroplasty    The patient is here for follow-up of their hip arthritis.  They complain of moderate hip pain.  Thus far the patient has tried activity modifications, anti-inflammatories.  The patient denies any recent trauma.  The patient denies any back pain, numbness or tingling in the lower extremity.    Past Medical History:   Diagnosis Date    Arthritis     Dental disease     Joint pain     OA (osteoarthritis)     Smoker     Smoker        Medication Documentation Review Audit       Reviewed by Sanna Bradley MA (Medical Assistant) on 24 at 1502      Medication Order Taking? Sig Documenting Provider Last Dose Status   acetaminophen (Tylenol Extra Strength) 500 mg tablet [id number] No Take 1 tablet (500 mg) by mouth 4 times a day as needed for mild pain (1 - 3). Historical Provider, MD Taking Active   cyclobenzaprine (Flexeril) 10 mg tablet [id number]  Take 1 tablet (10 mg) by mouth 3 times a day as needed for muscle spasms for up to 3 days. Vaughn Adler MD   24 2359   L. rhamnosus GG/inulin (C[employer]URELLE DIG[employer] HE[employer] ORAL) [id number] No Take 1 tablet by mouth once daily. Historical Provider, MD Not Taking Active   multivitamin tablet [id number] No Take 1 tablet by mouth once daily. Milena Hennessy MD Not Taking Active                    No Known Allergies    Social History     Socioeconomic History    Marital status:      Spouse name: Not on  file    Number of children: Not on file    Years of education: Not on file    Highest education level: Not on file   Occupational History    Not on file   Tobacco Use    Smoking status: Every Day     Current packs/day: 0.50     Types: Cigarettes    Smokeless tobacco: Never   Vaping Use    Vaping status: Never Used   Substance and Sexual Activity    Alcohol use: Yes     Comment: social    Drug use: Never    Sexual activity: Yes   Other Topics Concern    Not on file   Social History Narrative    Not on file     Social Determinants of Health     Financial Resource Strain: Patient Declined (3/28/2024)    Overall Financial Resource Strain (CARDIA)     Difficulty of Paying Living Expenses: Patient declined   Food Insecurity: Not on file   Transportation Needs: No Transportation Needs (4/19/2024)    OASIS : Transportation     Lack of Transportation (Medical): No     Lack of Transportation (Non-Medical): No     Patient Unable or Declines to Respond: No   Physical Activity: Not on file   Stress: Not on file   Social Connections: Feeling Socially Integrated (4/19/2024)    OASIS : Social Isolation     Frequency of experiencing loneliness or isolation: Never   Intimate Partner Violence: Not on file   Housing Stability: Patient Declined (3/28/2024)    Housing Stability Vital Sign     Unable to Pay for Housing in the Last Year: Patient declined     Number of Places Lived in the Last Year: 1     Unstable Housing in the Last Year: Patient declined       Past Surgical History:   Procedure Laterality Date    WISDOM TOOTH EXTRACTION         There is no height or weight on file to calculate BMI.    Physical examination  Left hip:  Antalgic gait  Negative Trendelenburg sign  Skin healthy and intact  No tenderness to palpation over lumbar spine  Minimal tenderness over greater trochanter     Mild pain with extreme of forward flexion   Rotation: Moderate to severe discomfort  No weakness with resisted hip flexion, abduction or  adduction     Positive flexion/adduction/internal rotation  Negative flexion/abduction/external rotation test  Negative straight leg raise  Neurovascular exam normal distally    Imaging:  XR hips bilateral 3 or 4 VW w pelvis when performed  Narrative: Interpreted By:  Jake Griggs III,   STUDY:  XR HIPS BILATERAL 3 OR 4 VW WITH PELVIS WHEN PERFORMED; ;  9/9/2024  3:45 pm      INDICATION:  Signs/Symptoms:pain.      ,M25.551 Pain in right hip,M25.552 Pain in left hip      COMPARISON:  None.      ACCESSION NUMBER(S):  [id number]      ORDERING CLINICIAN:  JAKE GRIGGS      FINDINGS:  Bilateral hip x-rays: Appropriately placed right total hip  arthroplasty no paco-implant fracture or lucency. There is severe  left hip arthritis. There is osteophytosis sclerosis joint space  narrowing, subchondral cystic change consistent with severe left hip  arthritis.      Impression: Severe left hip arthritis          MACRO:  None      Signed by: Jake Griggs III 9/9/2024 4:16 PM  Dictation workstation:   [id number]      Procedures    Impression:  53-year-old male status post right total hip arthroplasty 3 months prior doing well with mild right hip trochanteric bursitis, severe left hip arthritis    Plan:  X-ray findings again discussed with patient in detail  Discussed importance of low impact aerobic type exercise  Discussed importance of nutrition and healthy diet to help offload the hip joint  Discussed activities to avoid  Patient wishes to proceed with total hip arthroplasty regarding the left hip    Constellation of findings discussed with patient in detail was benefits alternative treatment discussed with the patient as well x-rays indicate severe left hip arthritis there is osteophytosis Brian is joint space narrowing.  He is not interested in injection as this did not provide him any relief in the past does wish to proceed with total hip arthroplasty.  Will obtain medical clearance prior to proceeding.  Patient would  like to do this on a outpatient basis at the surgery center.    History and physical exam as well as imaging findings discussed with patient in detail.  Patient has longstanding hip pain that has failed to improve with conservative treatment to include injections, activity modifications and anti-inflammatories.  Risk benefits and alternatives of treatment were discussed with the patient in detail.  Using shared informed decision making patient wishes to proceed with surgical intervention to include left total hip arthroplasty with Dell assist.  Risks of the surgery which include but are not limited to infection, need for revision surgery, neurovascular compromise, instability, infection, periprosthetic fracture, continued pain and stiffness were discussed with this patient in detail.  They do wish to proceed.      We will obtain medical clearance prior to proceeding.  We will also obtain a CT scan for preoperative planning.  Patient will return to clinic after these are completed for presurgical visit and further discussion of details regarding surgery and day of surgery.    I have personally reviewed the OARRS report for this patient. This report is scanned into the electronic medical record. I have considered the risks of abuse, dependence, addiction, and diversion. They currently report a pain of 8.     The risks of surgery were discussed including but not limited to the risks of medications given for surgery, the risk of blood loss during and after surgery that can lead to the need for blood products in certain situations, infection, damage to normal structures that can lead to long term problems of pain or dysfunction, wound healing complications, the possibility of nonunion/malunion of any osteotomies and late or chronic pain as a result of the surgical intervention.  In addition potentially life threatening complications that can occur at the time of surgery and after surgery were discussed including but not  limited to deep vein thrombosis, pulmonary embolism, myocardial infarction, stroke and death.

## 2024-09-13 ENCOUNTER — TELEPHONE (OUTPATIENT)
Dept: ORTHOPEDIC SURGERY | Facility: CLINIC | Age: 54
End: 2024-09-13
Payer: COMMERCIAL

## 2024-09-13 NOTE — TELEPHONE ENCOUNTER
"   Recent Vitals     4/19/2024  1203 6/18/2024  1458 Most Recent Value    Weight: -- 87.1 kg (192 lb) 87.1 kg (192 lb)  as of 6/18/2024    Body Mass Index:   31.95 kg/m² Abnormal   1.651 m (5' 5\")  as of 6/18/2024  87.1 kg (192 lb)  as of 6/18/2024    BP: 118/84 128/74 128/74  as of 6/18/2024    Heart Rate: 81 68 68  as of 6/18/2024    Resp: -- -- 16  as of 3/29/2024    Temp: 36.3 °C (97.4 °F) 36.6 °C (97.8 °F) 36.6 °C (97.8 °F)  as of 6/18/2024    SpO2: 100 % -- 100%  as of 4/19/2024    Height: -- 1.651 m (5' 5\") 1.651 m (5' 5\")  as of 6/18/2024    Peak Flow: -- -- Not taken    Body Surface Area:   2 m²  1.651 m (5' 5\")  as of 6/18/2024  87.1 kg (192 lb)  as of 6/18/2024    Adjusted Weight:   71.7 kg (158 lb 2.4 oz)  Actual Weight:  87.1 kg (192 lb)  as of 6/18/2024  Ideal Weight:  61.5 kg (135 lb 9.3 oz)  as of 6/18/2024    Dosing Weight:   None      "

## 2024-10-18 ENCOUNTER — APPOINTMENT (OUTPATIENT)
Dept: PRIMARY CARE | Facility: CLINIC | Age: 54
End: 2024-10-18
Payer: COMMERCIAL

## 2024-11-04 ENCOUNTER — OFFICE VISIT (OUTPATIENT)
Dept: ORTHOPEDIC SURGERY | Facility: CLINIC | Age: 54
End: 2024-11-04
Payer: COMMERCIAL

## 2024-11-04 DIAGNOSIS — M16.12 ARTHRITIS OF LEFT HIP: Primary | ICD-10-CM

## 2024-11-04 PROCEDURE — 99211 OFF/OP EST MAY X REQ PHY/QHP: CPT | Performed by: STUDENT IN AN ORGANIZED HEALTH CARE EDUCATION/TRAINING PROGRAM

## 2024-11-04 NOTE — PROGRESS NOTES
This patient has pain in the hip that is increased with activity and weightbearing, the patient walks with an antalgic gait at this time.  The pain is interfering with activities of daily living.  The patient has limited range of motion of the hip and pain with passive range of motion.  This x-ray demonstrates joint space narrowing, subchondral sclerosis, and osteophyte formation.  The patient has failed to respond to conservative treatment with medication therapy and supportive devices for period of at least 3 months.     This is the preop visit to discuss the risks and benefits of the total hip replacement surgery.  These risks were fully explained to the patient.  With this, and as any surgery, infection is a risk.  The risk for infection is usually between 1 and 2%.  This risk is even higher in diabetics, persons with rheumatoid arthritis, patients with previous surgery, patients on oral steroid medication, and obesity.  All of these issues were properly discussed.  We always assure all sterile techniques will be followed during the procedure.  Patient is also need to be on antibiotics for the next 2 years for any minor surgical procedure, even dental work.  For high risk patients this will be for lifetime.  Severe infections may require removal of the prosthesis.     It was also explained to the patient that there will be some blood loss during the procedure.  Transfusions although rare will only be given when absolutely medically necessary.     Pulmonary embolism and blood clots were also discussed with the patient.  We discussed that these can be fatal complications of the procedure.  Is explained to the patient that the use of thromboembolic stockings, foot pumps, incentive spirometer, early mobility, and the use of blood thinners for a period of time is the standard of care.     Dislocations are discussed with the patient.  It is explained that the highest risk for dislocating the hip happens during the  first 3 months after surgery.  These risks tend to decrease with time.  This risk is higher and revisions.  It is explained to the patient that hip precautions are very important and that these will be carried out throughout the lifetime with her prosthesis.  Intraoperatively, we will adjust the length of the leg to tighten the joint to help prevent dislocation.  This leg lengthening to stabilize the joint is usually no more than 1/4 inch but may be more or less to improve stability.     Loosening and wear of the prosthesis is also discussed.  The prosthesis normally lasts between 12 and 15 years on the average.  Revisions may be more complicated.     Fractures, though rare, they also occur intraoperatively.  These fractures may occur in the pelvis or the femur.  There may be nerve or arterial injuries as well and these are discussed in detail.  Lastly the benefits of spinal anesthesia were explained to the patient.     All of the patient's questions and concerns were answered in detail.     This note was prepared using voice recognition software.  The details of this note are correct and have been reviewed, and corrected to the best of my ability.  Some grammatical areas may persist related to the Dragon software      DVT ppx: Plan for aspirin 81 mg twice daily    Last images obtained: CT ordered to be performed Thursday this week 11/7/2024, x-rays 9/9/2024    Harjit Simmons PA-C

## 2024-11-08 ENCOUNTER — APPOINTMENT (OUTPATIENT)
Dept: PRIMARY CARE | Facility: CLINIC | Age: 54
End: 2024-11-08
Payer: COMMERCIAL

## 2024-11-08 VITALS
DIASTOLIC BLOOD PRESSURE: 70 MMHG | WEIGHT: 181 LBS | SYSTOLIC BLOOD PRESSURE: 124 MMHG | TEMPERATURE: 97.6 F | HEART RATE: 68 BPM | BODY MASS INDEX: 30.12 KG/M2

## 2024-11-08 DIAGNOSIS — E78.1 HYPERTRIGLYCERIDEMIA: ICD-10-CM

## 2024-11-08 DIAGNOSIS — M16.10 HIP ARTHRITIS: Primary | ICD-10-CM

## 2024-11-08 DIAGNOSIS — F17.200 SMOKER: ICD-10-CM

## 2024-11-08 PROCEDURE — 4004F PT TOBACCO SCREEN RCVD TLK: CPT | Performed by: INTERNAL MEDICINE

## 2024-11-08 PROCEDURE — 99213 OFFICE O/P EST LOW 20 MIN: CPT | Performed by: INTERNAL MEDICINE

## 2024-11-08 ASSESSMENT — ENCOUNTER SYMPTOMS
HEMATOLOGIC/LYMPHATIC NEGATIVE: 1
EYES NEGATIVE: 1
RESPIRATORY NEGATIVE: 1
NEUROLOGICAL NEGATIVE: 1
ENDOCRINE NEGATIVE: 1
CONSTITUTIONAL NEGATIVE: 1

## 2024-11-08 NOTE — PROGRESS NOTES
Subjective   Patient ID: Ramesh Julian is a 53 y.o. male who presents for Follow-up (Pt here for office visit   Tuesday left hip replacement).    HPI patient here for office visit he is going to get left hip replacement next Tuesday.  He is going for left hip replacement he had the right 1 done in April still smokes 10 cigarettes daily we did advise him to get colonoscopy and LDCT for cancer screening.  Review of Systems   Constitutional: Negative.    HENT: Negative.     Eyes: Negative.    Respiratory: Negative.     Endocrine: Negative.    Genitourinary: Negative.    Neurological: Negative.    Hematological: Negative.    All other systems reviewed and are negative.      Objective   /70   Pulse 68   Temp 36.4 °C (97.6 °F) (Temporal)   Wt 82.1 kg (181 lb)   BMI 30.12 kg/m²     Physical Exam  Vitals reviewed.   Constitutional:       Appearance: Normal appearance.   Neck:      Vascular: No carotid bruit.   Cardiovascular:      Rate and Rhythm: Normal rate and regular rhythm.   Pulmonary:      Effort: Pulmonary effort is normal.      Breath sounds: Normal breath sounds.   Lymphadenopathy:      Cervical: No cervical adenopathy.   Neurological:      General: No focal deficit present.      Mental Status: He is alert and oriented to person, place, and time.      Cranial Nerves: No cranial nerve deficit.      Sensory: No sensory deficit.       Assessment/Plan   Problem List Items Addressed This Visit             ICD-10-CM    Smoker F17.200    Hypertriglyceridemia E78.1    Hip arthritis - Primary M16.10   Patient is going for hip surgery next week.  We advised and discussed smoking cessation.  He has hypertriglyceridemia we advised him to follow low-cholesterol diet and avoid trans fats saturated fat and fried foods and red meat.  Repeat blood work will be done in few months.  Patient is medically cleared for his hip surgery note regarding this will be shared with orthopedics via common EMR     Advised colonoscopy  and LDCT for cancer screening

## 2024-11-11 ENCOUNTER — APPOINTMENT (OUTPATIENT)
Dept: PRIMARY CARE | Facility: CLINIC | Age: 54
End: 2024-11-11
Payer: COMMERCIAL

## 2024-11-11 DIAGNOSIS — M16.12 ARTHRITIS OF LEFT HIP: ICD-10-CM

## 2024-11-11 DIAGNOSIS — M25.552 BILATERAL HIP PAIN: ICD-10-CM

## 2024-11-11 DIAGNOSIS — M25.551 BILATERAL HIP PAIN: ICD-10-CM

## 2024-11-11 DIAGNOSIS — Z96.642 STATUS POST LEFT HIP REPLACEMENT: Primary | ICD-10-CM

## 2024-11-11 RX ORDER — ACETAMINOPHEN 325 MG/1
650 TABLET ORAL EVERY 6 HOURS PRN
Qty: 42 TABLET | Refills: 0 | Status: SHIPPED | OUTPATIENT
Start: 2024-11-11

## 2024-11-11 RX ORDER — ASPIRIN 81 MG/1
81 TABLET ORAL 2 TIMES DAILY
Qty: 60 TABLET | Refills: 0 | Status: SHIPPED | OUTPATIENT
Start: 2024-11-11 | End: 2024-12-11

## 2024-11-11 RX ORDER — DOCUSATE SODIUM 100 MG/1
100 CAPSULE, LIQUID FILLED ORAL 2 TIMES DAILY PRN
Qty: 60 CAPSULE | Refills: 0 | Status: SHIPPED | OUTPATIENT
Start: 2024-11-11

## 2024-11-11 RX ORDER — ONDANSETRON 4 MG/1
4 TABLET, FILM COATED ORAL EVERY 8 HOURS PRN
Qty: 20 TABLET | Refills: 0 | Status: SHIPPED | OUTPATIENT
Start: 2024-11-11 | End: 2024-11-18

## 2024-11-11 RX ORDER — OXYCODONE HYDROCHLORIDE 5 MG/1
5 TABLET ORAL EVERY 6 HOURS PRN
Qty: 28 TABLET | Refills: 0 | Status: SHIPPED | OUTPATIENT
Start: 2024-11-11 | End: 2024-11-18

## 2024-11-11 RX ORDER — CYCLOBENZAPRINE HCL 10 MG
10 TABLET ORAL 3 TIMES DAILY PRN
Qty: 21 TABLET | Refills: 0 | Status: SHIPPED | OUTPATIENT
Start: 2024-11-11 | End: 2024-11-18

## 2024-11-11 RX ORDER — CHLORHEXIDINE GLUCONATE 40 MG/ML
SOLUTION TOPICAL
Qty: 473 ML | Refills: 0 | Status: SHIPPED | OUTPATIENT
Start: 2024-11-11

## 2024-11-11 RX ORDER — CHLORHEXIDINE GLUCONATE ORAL RINSE 1.2 MG/ML
SOLUTION DENTAL
Qty: 30 ML | Refills: 0 | Status: SHIPPED | OUTPATIENT
Start: 2024-11-11

## 2024-11-12 ENCOUNTER — TELEPHONE (OUTPATIENT)
Dept: ORTHOPEDIC SURGERY | Facility: CLINIC | Age: 54
End: 2024-11-12
Payer: COMMERCIAL

## 2024-11-12 ENCOUNTER — HOME HEALTH ADMISSION (OUTPATIENT)
Dept: HOME HEALTH SERVICES | Facility: HOME HEALTH | Age: 54
End: 2024-11-12
Payer: COMMERCIAL

## 2024-11-12 PROCEDURE — 27130 TOTAL HIP ARTHROPLASTY: CPT | Performed by: STUDENT IN AN ORGANIZED HEALTH CARE EDUCATION/TRAINING PROGRAM

## 2024-11-12 PROCEDURE — 27130 TOTAL HIP ARTHROPLASTY: CPT

## 2024-11-12 NOTE — TELEPHONE ENCOUNTER
Radha from [facility] called stating that Ramesh would like to go to [facility] and not [facility]. The orders that were in the system  on 10/23/2024. Will request for new orders to be placed.

## 2024-11-13 ENCOUNTER — HOME CARE VISIT (OUTPATIENT)
Dept: HOME HEALTH SERVICES | Facility: HOME HEALTH | Age: 54
End: 2024-11-13
Payer: COMMERCIAL

## 2024-11-13 VITALS — SYSTOLIC BLOOD PRESSURE: 130 MMHG | DIASTOLIC BLOOD PRESSURE: 70 MMHG

## 2024-11-13 PROCEDURE — G0151 HHCP-SERV OF PT,EA 15 MIN: HCPCS

## 2024-11-13 ASSESSMENT — ACTIVITIES OF DAILY LIVING (ADL)
OASIS_M1830: 02
ENTERING_EXITING_HOME: ONE PERSON

## 2024-11-14 ASSESSMENT — ENCOUNTER SYMPTOMS
PAIN LOCATION - PAIN FREQUENCY: FREQUENT
PERSON REPORTING PAIN: PATIENT
PAIN LOCATION - PAIN SEVERITY: 8/10
PAIN: 1
PAIN LOCATION - RELIEVING FACTORS: ICE, PAIN MEDS
PAIN LOCATION: LEFT HIP

## 2024-11-15 ENCOUNTER — HOME CARE VISIT (OUTPATIENT)
Dept: HOME HEALTH SERVICES | Facility: HOME HEALTH | Age: 54
End: 2024-11-15
Payer: COMMERCIAL

## 2024-11-15 PROCEDURE — G0157 HHC PT ASSISTANT EA 15: HCPCS | Mod: CQ

## 2024-11-15 SDOH — HEALTH STABILITY: PHYSICAL HEALTH: EXERCISE ACTIVITY: LAQ, AP

## 2024-11-15 SDOH — HEALTH STABILITY: PHYSICAL HEALTH: PHYSICAL EXERCISE: SUPINE

## 2024-11-15 SDOH — HEALTH STABILITY: PHYSICAL HEALTH: EXERCISE ACTIVITY: AP, QS, GS, HS, HIP ABD, SAQ

## 2024-11-15 SDOH — HEALTH STABILITY: PHYSICAL HEALTH: EXERCISE ACTIVITIES SETS: 1

## 2024-11-15 SDOH — HEALTH STABILITY: PHYSICAL HEALTH: PHYSICAL EXERCISE: 15

## 2024-11-15 SDOH — HEALTH STABILITY: PHYSICAL HEALTH: EXERCISE TYPE: SUPINE AND SEATED LE STRENGTHENING

## 2024-11-15 SDOH — HEALTH STABILITY: PHYSICAL HEALTH: PHYSICAL EXERCISE: SEATED

## 2024-11-15 ASSESSMENT — ENCOUNTER SYMPTOMS
PAIN: 1
PAIN LOCATION - PAIN SEVERITY: 6/10
PAIN LOCATION: LEFT HIP
PERSON REPORTING PAIN: PATIENT

## 2024-11-15 ASSESSMENT — ACTIVITIES OF DAILY LIVING (ADL)
AMBULATION_DISTANCE/DURATION_TOLERATED: 50 FT X3
AMBULATION ASSISTANCE ON FLAT SURFACES: 1

## 2024-11-19 ENCOUNTER — HOME CARE VISIT (OUTPATIENT)
Dept: HOME HEALTH SERVICES | Facility: HOME HEALTH | Age: 54
End: 2024-11-19
Payer: COMMERCIAL

## 2024-11-19 PROCEDURE — G0157 HHC PT ASSISTANT EA 15: HCPCS | Mod: CQ

## 2024-11-19 SDOH — HEALTH STABILITY: PHYSICAL HEALTH: PHYSICAL EXERCISE: STANDING

## 2024-11-19 SDOH — HEALTH STABILITY: PHYSICAL HEALTH: EXERCISE TYPE: STANDING LE STRENGTHENING

## 2024-11-19 SDOH — HEALTH STABILITY: PHYSICAL HEALTH: PHYSICAL EXERCISE: 15

## 2024-11-19 SDOH — HEALTH STABILITY: PHYSICAL HEALTH: EXERCISE ACTIVITY: HRTR, MARCHING, HIP ABD, EXT, HAM CURLS

## 2024-11-19 SDOH — HEALTH STABILITY: PHYSICAL HEALTH: EXERCISE ACTIVITIES SETS: 1

## 2024-11-19 ASSESSMENT — ACTIVITIES OF DAILY LIVING (ADL)
AMBULATION_DISTANCE/DURATION_TOLERATED: 75 FT X2
AMBULATION ASSISTANCE ON FLAT SURFACES: 1

## 2024-11-19 ASSESSMENT — ENCOUNTER SYMPTOMS
PAIN LOCATION - PAIN QUALITY: SORE
PAIN LOCATION: LEFT HIP
PAIN LOCATION - PAIN SEVERITY: 6/10
PERSON REPORTING PAIN: PATIENT
PAIN: 1

## 2024-11-21 ENCOUNTER — HOME CARE VISIT (OUTPATIENT)
Dept: HOME HEALTH SERVICES | Facility: HOME HEALTH | Age: 54
End: 2024-11-21
Payer: COMMERCIAL

## 2024-11-21 PROCEDURE — G0157 HHC PT ASSISTANT EA 15: HCPCS | Mod: CQ

## 2024-11-21 SDOH — HEALTH STABILITY: PHYSICAL HEALTH: EXERCISE ACTIVITY: STANDING PROGRAM

## 2024-11-21 SDOH — HEALTH STABILITY: PHYSICAL HEALTH: EXERCISE TYPE: FINALIZED HEP FOR STRENGTHENING

## 2024-11-21 SDOH — HEALTH STABILITY: PHYSICAL HEALTH: PHYSICAL EXERCISE: 15

## 2024-11-21 ASSESSMENT — ENCOUNTER SYMPTOMS
PAIN LOCATION: LEFT HIP
PERSON REPORTING PAIN: PATIENT
PAIN LOCATION - PAIN QUALITY: SORE, ACHY
PAIN LOCATION - PAIN SEVERITY: 6/10
PAIN: 1

## 2024-11-21 ASSESSMENT — ACTIVITIES OF DAILY LIVING (ADL)
AMBULATION_DISTANCE/DURATION_TOLERATED: 100 FT X2
AMBULATION ASSISTANCE ON FLAT SURFACES: 1

## 2024-11-25 ENCOUNTER — HOSPITAL ENCOUNTER (OUTPATIENT)
Dept: RADIOLOGY | Facility: CLINIC | Age: 54
Discharge: HOME | End: 2024-11-25
Payer: COMMERCIAL

## 2024-11-25 ENCOUNTER — OFFICE VISIT (OUTPATIENT)
Dept: ORTHOPEDIC SURGERY | Facility: CLINIC | Age: 54
End: 2024-11-25
Payer: COMMERCIAL

## 2024-11-25 VITALS — HEIGHT: 65 IN | BODY MASS INDEX: 30.16 KG/M2 | WEIGHT: 181 LBS

## 2024-11-25 DIAGNOSIS — M16.12 ARTHRITIS OF LEFT HIP: ICD-10-CM

## 2024-11-25 DIAGNOSIS — M25.552 BILATERAL HIP PAIN: ICD-10-CM

## 2024-11-25 DIAGNOSIS — Z96.642 STATUS POST LEFT HIP REPLACEMENT: Primary | ICD-10-CM

## 2024-11-25 DIAGNOSIS — M25.551 BILATERAL HIP PAIN: ICD-10-CM

## 2024-11-25 PROCEDURE — 3008F BODY MASS INDEX DOCD: CPT | Performed by: STUDENT IN AN ORGANIZED HEALTH CARE EDUCATION/TRAINING PROGRAM

## 2024-11-25 PROCEDURE — 4004F PT TOBACCO SCREEN RCVD TLK: CPT | Performed by: STUDENT IN AN ORGANIZED HEALTH CARE EDUCATION/TRAINING PROGRAM

## 2024-11-25 PROCEDURE — 73502 X-RAY EXAM HIP UNI 2-3 VIEWS: CPT | Mod: LEFT SIDE | Performed by: STUDENT IN AN ORGANIZED HEALTH CARE EDUCATION/TRAINING PROGRAM

## 2024-11-25 PROCEDURE — 99024 POSTOP FOLLOW-UP VISIT: CPT | Performed by: STUDENT IN AN ORGANIZED HEALTH CARE EDUCATION/TRAINING PROGRAM

## 2024-11-25 PROCEDURE — 73502 X-RAY EXAM HIP UNI 2-3 VIEWS: CPT | Mod: LT

## 2024-11-25 PROCEDURE — 99211 OFF/OP EST MAY X REQ PHY/QHP: CPT | Performed by: STUDENT IN AN ORGANIZED HEALTH CARE EDUCATION/TRAINING PROGRAM

## 2024-11-25 RX ORDER — CYCLOBENZAPRINE HCL 10 MG
10 TABLET ORAL 3 TIMES DAILY PRN
Qty: 21 TABLET | Refills: 0 | Status: SHIPPED | OUTPATIENT
Start: 2024-11-25 | End: 2024-12-02

## 2024-11-25 NOTE — PROGRESS NOTES
Chief Complaint   Patient presents with    Left Hip - Post-op     Total hip arthoplasty TRISTAN 11/12/2024       History of Present Illness  Ramesh is a 53-year-old male presenting today for first postop follow-up after left total hip arthroplasty 11/12/2024.  Patient returns today endorsing mild pain.  Denies any numbness or tingling.  Has been ambulating with the assistance of a walker.  Has just begun ambulating with a cane for short distances.  Does endorse nighttime muscle spasms which he has been having to take Flexeril for.  Requesting a refill for this today  No calf pain, No chest pain or shortness of breath.     Exam  Mild swelling thigh  Healthy incision, no erythema or drainage  Tolerates ROM and gentle log roll of hip without issues  + Plantar/dorsiflexion  Negative Stanislav test     X-Ray    Narrative & Impression   Interpreted By:  Jake Griggs III,   STUDY:  XR HIP LEFT WITH PELVIS WHEN PERFORMED 2 OR 3 VIEWS; ;  11/25/2024  1:57 pm      INDICATION:  Signs/Symptoms:pain.      ,M16.12 Unilateral primary osteoarthritis, left hip      COMPARISON:  None.      ACCESSION NUMBER(S):  [id number]      ORDERING CLINICIAN:  JAKE GRIGGS      FINDINGS:  AP pelvis lateral left hip: Status post left total hip arthroplasty  appropriately placed no paco-implant fracture or lucency. Well placed  right total hip arthroplasty      IMPRESSION:  Status post left total hip arthroplasty          MACRO:  None      Signed by: Jake Griggs III 11/25/2024 2:12 PM  Dictation workstation:   [id number]        Assessment  Patient status post left total hip arthroplasty 2 weeks out     Plan  Sutures removed today, Steri-Strips applied.  Discussed analgesics, encouraging non-opioid modalities.  Refill for Flexeril provided today  Encouraged ice, rest.  Discussed hip precautions, DVT prophylaxis, and rehab  Follow-up 6 weeks   XR at follow up : None    Harjit Simmons PA-C

## 2024-11-26 ENCOUNTER — HOME CARE VISIT (OUTPATIENT)
Dept: HOME HEALTH SERVICES | Facility: HOME HEALTH | Age: 54
End: 2024-11-26
Payer: COMMERCIAL

## 2024-11-26 PROCEDURE — G0151 HHCP-SERV OF PT,EA 15 MIN: HCPCS

## 2024-11-26 ASSESSMENT — ENCOUNTER SYMPTOMS
PAIN: 1
PAIN LOCATION - PAIN SEVERITY: 5/10
PERSON REPORTING PAIN: PATIENT
LOWEST PAIN SEVERITY IN PAST 24 HOURS: 5/10
PAIN LOCATION: LEFT HIP
HIGHEST PAIN SEVERITY IN PAST 24 HOURS: 7/10
SUBJECTIVE PAIN PROGRESSION: GRADUALLY IMPROVING

## 2024-11-26 ASSESSMENT — ACTIVITIES OF DAILY LIVING (ADL)
OASIS_M1830: 00
HOME_HEALTH_OASIS: 00

## 2024-12-04 ENCOUNTER — EVALUATION (OUTPATIENT)
Dept: PHYSICAL THERAPY | Facility: CLINIC | Age: 54
End: 2024-12-04
Payer: COMMERCIAL

## 2024-12-04 DIAGNOSIS — M16.12 PRIMARY OSTEOARTHRITIS OF LEFT HIP: Primary | ICD-10-CM

## 2024-12-04 PROCEDURE — 97161 PT EVAL LOW COMPLEX 20 MIN: CPT | Mod: GP | Performed by: PHYSICAL THERAPIST

## 2024-12-04 PROCEDURE — 97535 SELF CARE MNGMENT TRAINING: CPT | Mod: GP | Performed by: PHYSICAL THERAPIST

## 2024-12-04 ASSESSMENT — PAIN SCALES - GENERAL: PAINLEVEL_OUTOF10: 6

## 2024-12-04 ASSESSMENT — PAIN DESCRIPTION - DESCRIPTORS: DESCRIPTORS: ACHING

## 2024-12-04 ASSESSMENT — PAIN - FUNCTIONAL ASSESSMENT: PAIN_FUNCTIONAL_ASSESSMENT: 0-10

## 2024-12-04 NOTE — PROGRESS NOTES
PT Initial Evaluation    Patient Name:  Ramesh Julian    MRN:  [id number]    :  1970    Today's Date:  24    Time Calculation  Start Time: 1530  Stop Time: 1605  Time Calculation (min): 35 min  PT Evaluation Time Entry  PT Evaluation (Low) Time Entry: 20  PT Therapeutic Procedures Time Entry  Self-Care/Home Mgmt Training: 15       Informed Consent  Patient has been informed of all evaluation findings and treatment plans and agrees to participate in Physical Therapy services and plans as outlined.    Diagnosis:  Diagnosis and Precautions: L hip osteoarthritis, s/p L ARIANNA Dell 2024    Goals:   By the end of 8 visits patient will be able to do the following with < 0-1/10 L HIP pain:    HEP:  Patient will consistently perform HIS home exercise program for 20-30 minute sessions, 1-2x/day, 3-4 days/week independently by the end of 8 visits.    Basic ADL's:   Patient will perform bADL's/instrumental ADL's for 30 minutes moving between various closed kinetic chain postures.    ROM and Strength:  Patient will demonstrate 5/5 L HIP strength in all planes and WNL's L HIP AROM in all planes to improve their ability to lift, stand, ambulate and perform basic ADL's.    Stair Negotiation:  Patient will be able to ambulate up/down stairs for 1-2 flights at a time.    Gait/Locomotion:  Patient will be able to ambulate for 30-60 minutes at a time.  Minimal to no gait deviation across level ground/stairs.    Sleep:  Patient will sleep thru the night 4/7 nights/week.    Participation restrictions:  Increase LEFS to > or = to 55/80 for increased functional ability.    Pain:  Decrease pain at worst to < or = to 0-1/10 for improved QOL and ability to sleep.    No point tenderness noted over the L hip.     Plan of Care:      Treatment/Interventions: Cryotherapy, Education/ Instruction, Electrical stimulation, Gait training, Manual therapy, Neuromuscular re-education, Self care/ home management, Therapeutic activities,  Therapeutic exercises  PT Plan: Skilled PT  PT Frequency:  (1-2x/week)  Duration: 8 visits     Certification Period Start Date: 12/04/24  Certification Period End Date: 03/04/25  Number of Treatments Authorized: 8  Rehab Potential: Good  Plan of Care Agreement: Patient    PT Assessment:    Patient is a 53 y.o. MALE with c/o L hip pain.   Patient is alert and oriented x 3.  Patient presents with medical diagnosis of L hip osteoarthritis, s/p L ARIANNA Dell 11/12/2024  contributing to compensatory soft tissue dysfunction, pain, stiffness and weakness of the L hip.   Significant past medical history/past surgical history includes see below.    Skilled care is needed to progress the patient back to these activities without exacerbating symptoms.   Patient requires skilled PT services to address the problems identified and the individualized patient's goals as outlined in the problems and goals section of this evaluation.  A skilled PT is required to address these key impairments and to provide and progress with an appropriate home exercise program. Patient does not have any significant PMH influencing Rx and reports motivation to return to FUNCTIONAL ACTIVITY.   Patient demonstrates to be a good candidate for physical therapy with good rehab potential and verbalized a good understanding of HIS diagnosis, prognosis and treatment.  Goals have been established and reviewed with the patient.      PT Assessment Results: Decreased strength, Decreased range of motion, Decreased endurance, Impaired balance, Decreased mobility, Orthopedic restrictions, Pain  Rehab Prognosis: Good  Evaluation/Treatment Tolerance: Patient limited by fatigue, Patient limited by pain    Complexity:  Low complexity evaluation  due to a 20 minute duration, a past medical history WITHOUT any personal factors and/or comorbidities that could impact the POC, examination of body systems completed on one to two elements, the patient presents with a stable  condition.    Prognosis:  Rehab Prognosis: Good    Problem List  Activity Limitations, Decreased Functional Level, Decreased knowledge of HEP, Flexibility, Gait issues, Pain, Range of Motion/joint mobility issues, Strength, and Endurance    Impairments   IMPAIRED ROM LOWER BODY, IMPAIRED STRENGTH LOWER BODY, IMPAIRED GAIT, IMPAIRED BALANCE, IMPAIRED CORE STABILITY, INCREASED PAIN, IMPAIRED FUNCTIONAL ACTIVITY LEVEL, and IMPAIRED FUNCTIONAL MOVEMENT PATTERNS    Functional Limitations:  LIMITATIONS PERFORMING BASIC ADL'S, ISSUES WITH SLEEP, PARTICIPATION IN HOBBIES, PARTICIPATION IN LEISURE ACTIVITIES, and PARTICIPATION IN HOME MANAGEMENT    General Visit Information:  Reason for Referral: PT Evaluation  Referred By: Dr. Vaughn Adler  General Comment: L hip osteoarthritis, s/p L ARIANNA Dell 11/12/2024    Pre-Cautions:   Avoid Hip Flexion Beyond 90 Degrees  Do not bend at the hip to reach down past your knees. This means avoiding bending forward too far, such as when putting on shoes or socks, or picking something up from the ground.  2. Avoid Internal Rotation  Do not turn the operated leg inward. Keep your toes pointing forward or slightly outwards, rather than letting your toes turn towards the center of your body.  3. Avoid Crossing Legs or Ankles  Do not cross your operated leg over the midline of your body, and avoid crossing your legs or ankles when sitting, standing, or lying down.  4. Avoid Combined Hip Flexion and Internal Rotation  Avoid positions that combine bending your hip while turning it inward. An example to avoid would be sitting in a low chair and twisting to reach behind you.  5. Sleeping Precautions  When sleeping, do not lie on the operated side without support between your legs. A pillow between your legs can help maintain proper hip alignment. Some surgeons may recommend sleeping on your back with a pillow between your knees for a period.   LE Weight Bearing Status: Weight Bearing as Tolerated    )  Post-Surgical Precautions: Left hip precautions (see eval)    Reason for Visit:  PT Evaluate and Treat    Initial Evaluation:  Referred By: Dr. Vaughn Adler    Insurance  Insurance reviewed  Name of Insurance:  [facility]  Visit No.  1  * (EVAL) [facility] (INDIV DED IS MET FOR 2024) // 6600/13,500 (6047.97 OF INDIV DED IS MET FOR 2024) // 0 COPAY // 20% COINSUR // 100V CY PT/OT COMB (88V REMAIN) // PRIOR AUTH NOT REQ PER SECURE.[facility] 11/18/24RC // Evonne confirmed 11/27/24 3:36pm     Subjective:    Current Episode  Date of Onset:  3 years ago  Mechanism of injury:  arthritic changes of the L hip over time  Chief Complaint:  L hip pain, weakness, stiffness  Progression of symptoms:  improving over time    Pain Score:  Pain Assessment: 0-10  Pain Assessment  Pain Assessment: 0-10  0-10 (Numeric) Pain Score: 6 (8/10 worst, 6/10 least)  Pain Type: Chronic pain  Pain Location: Hip  Pain Orientation: Left  Pain Descriptors: Aching  Pain Frequency: Constant/continuous  Pain Onset: Ongoing  Pain Type: Chronic pain  Pain Location: Hip  Pain Orientation: Left  Pain Descriptors: Aching  Pain Frequency: Constant/continuous    Better with:  Meds, ice    Worse with:  prolonged standing, walking, sitting, driving, getting in/out of car/bed/chair    Medical History/Surgical History:     Reviewed medical history form with patient (medications/allergies reviewed with patient).  Current Outpatient Medications   Medication Instructions    acetaminophen (TYLENOL) 650 mg, oral, Every 6 hours PRN    aspirin 81 mg, oral, 2 times daily    chlorhexidine (Hibiclens) 4 % external liquid Wash for 5 days prior to surgery, including day of surgery    chlorhexidine (Peridex) 0.12 % solution Rinse mouth with 15 mL (1 cap full) for 30 seconds, AM and PM after toothbrushing. Expectorate after rinsing, do not swallow. Use the night before and morning of surgery.    cyclobenzaprine  (FLEXERIL) 10 mg, oral, 3 times daily PRN    docusate sodium (COLACE) 100 mg, oral, 2 times daily PRN    ibuprofen (MOTRIN) 400 mg, oral, 4 times daily PRN    L. rhamnosus GG/inulin (CULTURELLE DIGESTIVE HEALTH ORAL) 1 tablet, Daily    multivitamin tablet 1 tablet, Daily     Radiology:    Exam Information    Status Exam Begun Exam Ended   Final 11/25/2024 13:47 11/25/2024 13:57     Study Result    Narrative & Impression   Interpreted By:  Jake Griggs III,   STUDY:  XR HIP LEFT WITH PELVIS WHEN PERFORMED 2 OR 3 VIEWS; ;  11/25/2024  1:57 pm      INDICATION:  Signs/Symptoms:pain.      ,M16.12 Unilateral primary osteoarthritis, left hip      COMPARISON:  None.      ACCESSION NUMBER(S):  [id number]      ORDERING CLINICIAN:  JAKE GRIGGS      FINDINGS:  AP pelvis lateral left hip: Status post left total hip arthroplasty  appropriately placed no paco-implant fracture or lucency. Well placed  right total hip arthroplasty      IMPRESSION:  Status post left total hip arthroplasty     Functional Assessment:  Level of [place]:  Level of [place]: Independent with ADLs and functional transfers    Social History:    Work Status:  UNEMPLOYED  Patient Awareness:  Patient is aware of HIS diagnosis and prognosis.  Social Support/History:  LIVES WITH FAMILY    Objective:    Weightbearing Status:  WBAT L E with cane assist    Any Pre-Cautions:    Avoid Hip Flexion Beyond 90 Degrees  Do not bend at the hip to reach down past your knees. This means avoiding bending forward too far, such as when putting on shoes or socks, or picking something up from the ground.  2. Avoid Internal Rotation  Do not turn the operated leg inward. Keep your toes pointing forward or slightly outwards, rather than letting your toes turn towards the center of your body.  3. Avoid Crossing Legs or Ankles  Do not cross your operated leg over the midline of your body, and avoid crossing your legs or ankles when sitting, standing, or lying down.  4.  Avoid Combined Hip Flexion and Internal Rotation  Avoid positions that combine bending your hip while turning it inward. An example to avoid would be sitting in a low chair and twisting to reach behind you.  5. Sleeping Precautions  When sleeping, do not lie on the operated side without support between your legs. A pillow between your legs can help maintain proper hip alignment. Some surgeons may recommend sleeping on your back with a pillow between your knees for a period.     Skin:  L hip surgical ARIANNA incision healing and closed, no active signs of infection.    Palpation:   mild point tenderness over L lateral hip.    Sensation:  Patient denies numbness/tingling of bilateral LOWER extremities.    Gait:  Mild antalgic gait L LE with cane    ROM:    L hip PROM  Flexion 80 degrees  Abduction 35 degrees  R hip AROM  Flexion 95 degrees  Abduction WNL's  ER WFL's  R knee AROM WNL's, L knee AROM WNL's, R ankle/foot AROM WNL's, and L ankle/foot AROM WNL's    Strength:    L hip flexion 4-/5 with pain  Abduction 3/5 with pain  ER not tested  R hip flexion 4+/5  Abduction 4+/5  ER 4/5  L knee flexion/extension 4/5 each  R knee 5/5 all planes, R ankle/foot 5/5 all planes, and L ankle/foot 5/5 all planes    Outcome measure  Lower Extremity Funtional Score (LEFS): 32/80     Treatment  Time in clinic started at  3:30pm  Time in clinic ended at  4:05pm  Total time in clinic is . 35 minutes  Total timed code time is  30 minutes    Treatment Performed Today:.   PT Initial Evaluation and Self-Care/Home Management HEP  Individual(s) Educated: Patient  Education Provided: Home Exercise Program  Diagnosis and Precautions: L hip osteoarthritis, s/p L ARIANNA Dell 11/12/2024  Risk and Benefits Discussed with Patient/Caregiver/Other: yes  Patient/Caregiver Demonstrated Understanding: yes  Plan of Care Discussed and Agreed Upon: yes  Patient Response to Education: Patient/Caregiver Verbalized Understanding of Information, Patient/Caregiver  Performed Return Demonstration of Exercises/Activities    Patient instructed in a home exercise program, has been given handouts for each of the exercises performed and was given another sheet instructing patient in the amount of reps to perform and the salbador to follow while doing the exercises     Access Code: [email or web address]  URL: [email or web address]  Date: 12/04/2024  Prepared by: Garret Dillard    Exercises  - Bent Knee Fallouts  - 1 x daily - 3-4 x weekly - 2 sets - 10 reps - 5-10 hold  - Supine Bridge  - 1 x daily - 3-4 x weekly - 2 sets - 10 reps - 5-10 hold  - Seated Long Arc Quad  - 1 x daily - 3-4 x weekly - 2 sets - 10 reps - 5-10 hold  - Seated March  - 1 x daily - 3-4 x weekly - 2 sets - 10 reps - 5-10 hold  - Standing Hip Extension with Counter Support  - 1 x daily - 3-4 x weekly - 2 sets - 10 reps - 5-10 hold  - Standing Hip Abduction with Counter Support  - 1 x daily - 3-4 x weekly - 2 sets - 10 reps - 5-10 hold  - Mini Squat with Counter Support  - 1 x daily - 3-4 x weekly - 2 sets - 10 reps - 5-10 hold

## 2024-12-04 NOTE — LETTER
2024    Garret Dillard, PT  [address] [place], [facility] [address]  [facility] [address]    Patient: Ramesh Julian   YOB: 1970   Date of Visit: 2024       Dear No referring provider defined for this encounter.    The attached plan of care is being sent to you because your patient’s medical reimbursement requires that you certify the plan of care. Your signature is required to allow uninterrupted insurance coverage.      You may indicate your approval by signing below and faxing this form back to us at Dept Fax: [phone].    Please call Dept: [phone] with any questions or concerns.    Thank you for this referral,        Garret Dillard PT  [place] [id number] [facility]  [address] [facility] [address]    Payer: Payor: [facility] / Plan: [facility] / Product Type: *No Product type* /                                                                         Date:     Dear Garret Dillard, PT,     Re: Mr. Ramesh Julian, MRN:[id number]    I certify that I have reviewed the attached plan of care and it is medically necessary for Mr. Ramesh Julian (1970) who is under my care.          ______________________________________                    _________________  Provider name and credentials                                           Date and time                                                                                           Plan of Care 24   Effective from: 2024  Effective to: 3/4/2025    Plan ID: [id number]            Participants as of Finalize on 2024    Name Type Comments Contact Info    Garret Dillard PT Physical Therapist  [phone]       Last Plan Note     Author: Garret Dillard PT Status: Incomplete Last edited: 2024  3:30 PM       PT Initial Evaluation    Patient Name:  Ramesh Julian    MRN:  [id number]    :  1970    Today's Date:   12/04/24    Time Calculation  Start Time: 1530  Stop Time: 1605  Time Calculation (min): 35 min  PT Evaluation Time Entry  PT Evaluation (Low) Time Entry: 20  PT Therapeutic Procedures Time Entry  Self-Care/Home Mgmt Training: 15       Informed Consent  Patient has been informed of all evaluation findings and treatment plans and agrees to participate in Physical Therapy services and plans as outlined.    Diagnosis:  Diagnosis and Precautions: L hip osteoarthritis, s/p L ARIANNA Dell 11/12/2024    Goals:   By the end of 8 visits patient will be able to do the following with < 0-1/10 L HIP pain:    HEP:  Patient will consistently perform HIS home exercise program for 20-30 minute sessions, 1-2x/day, 3-4 days/week independently by the end of 8 visits.    Basic ADL's:   Patient will perform bADL's/instrumental ADL's for 30 minutes moving between various closed kinetic chain postures.    ROM and Strength:  Patient will demonstrate 5/5 L HIP strength in all planes and WNL's L HIP AROM in all planes to improve their ability to lift, stand, ambulate and perform basic ADL's.    Stair Negotiation:  Patient will be able to ambulate up/down stairs for 1-2 flights at a time.    Gait/Locomotion:  Patient will be able to ambulate for 30-60 minutes at a time.  Minimal to no gait deviation across level ground/stairs.    Sleep:  Patient will sleep thru the night 4/7 nights/week.    Participation restrictions:  Increase LEFS to > or = to 55/80 for increased functional ability.    Pain:  Decrease pain at worst to < or = to 0-1/10 for improved QOL and ability to sleep.    No point tenderness noted over the L hip.     Plan of Care:      Treatment/Interventions: Cryotherapy, Education/ Instruction, Electrical stimulation, Gait training, Manual therapy, Neuromuscular re-education, Self care/ home management, Therapeutic activities, Therapeutic exercises  PT Plan: Skilled PT  PT Frequency:  (1-2x/week)  Duration: 8 visits     Certification  Period Start Date: 12/04/24  Certification Period End Date: 03/04/25  Number of Treatments Authorized: 8  Rehab Potential: Good  Plan of Care Agreement: Patient    PT Assessment:    Patient is a 53 y.o. MALE with c/o L hip pain.   Patient is alert and oriented x 3.  Patient presents with medical diagnosis of L hip osteoarthritis, s/p L ARIANNA Dell 11/12/2024  contributing to compensatory soft tissue dysfunction, pain, stiffness and weakness of the L hip.   Significant past medical history/past surgical history includes see below.    Skilled care is needed to progress the patient back to these activities without exacerbating symptoms.   Patient requires skilled PT services to address the problems identified and the individualized patient's goals as outlined in the problems and goals section of this evaluation.  A skilled PT is required to address these key impairments and to provide and progress with an appropriate home exercise program. Patient does not have any significant PMH influencing Rx and reports motivation to return to FUNCTIONAL ACTIVITY.   Patient demonstrates to be a good candidate for physical therapy with good rehab potential and verbalized a good understanding of HIS diagnosis, prognosis and treatment.  Goals have been established and reviewed with the patient.      PT Assessment Results: Decreased strength, Decreased range of motion, Decreased endurance, Impaired balance, Decreased mobility, Orthopedic restrictions, Pain  Rehab Prognosis: Good  Evaluation/Treatment Tolerance: Patient limited by fatigue, Patient limited by pain    Complexity:  Low complexity evaluation  due to a 20 minute duration, a past medical history WITHOUT any personal factors and/or comorbidities that could impact the POC, examination of body systems completed on one to two elements, the patient presents with a stable condition.    Prognosis:  Rehab Prognosis: Good    Problem List  Activity Limitations, Decreased Functional Level,  Decreased knowledge of HEP, Flexibility, Gait issues, Pain, Range of Motion/joint mobility issues, Strength, and Endurance    Impairments   IMPAIRED ROM LOWER BODY, IMPAIRED STRENGTH LOWER BODY, IMPAIRED GAIT, IMPAIRED BALANCE, IMPAIRED CORE STABILITY, INCREASED PAIN, IMPAIRED FUNCTIONAL ACTIVITY LEVEL, and IMPAIRED FUNCTIONAL MOVEMENT PATTERNS    Functional Limitations:  LIMITATIONS PERFORMING BASIC ADL'S, ISSUES WITH SLEEP, PARTICIPATION IN HOBBIES, PARTICIPATION IN LEISURE ACTIVITIES, and PARTICIPATION IN HOME MANAGEMENT    General Visit Information:  Reason for Referral: PT Evaluation  Referred By: Dr. Vaughn Adler  General Comment: L hip osteoarthritis, s/p L ARIANNA Dell 11/12/2024    Pre-Cautions:   Avoid Hip Flexion Beyond 90 Degrees  Do not bend at the hip to reach down past your knees. This means avoiding bending forward too far, such as when putting on shoes or socks, or picking something up from the ground.  2. Avoid Internal Rotation  Do not turn the operated leg inward. Keep your toes pointing forward or slightly outwards, rather than letting your toes turn towards the center of your body.  3. Avoid Crossing Legs or Ankles  Do not cross your operated leg over the midline of your body, and avoid crossing your legs or ankles when sitting, standing, or lying down.  4. Avoid Combined Hip Flexion and Internal Rotation  Avoid positions that combine bending your hip while turning it inward. An example to avoid would be sitting in a low chair and twisting to reach behind you.  5. Sleeping Precautions  When sleeping, do not lie on the operated side without support between your legs. A pillow between your legs can help maintain proper hip alignment. Some surgeons may recommend sleeping on your back with a pillow between your knees for a period.   LE Weight Bearing Status: Weight Bearing as Tolerated   )  Post-Surgical Precautions: Left hip precautions (see eval)    Reason for Visit:  PT Evaluate and  Treat    Initial Evaluation:  Referred By: Dr. Vaughn Adler    Insurance  Insurance reviewed  Name of Insurance:  [facility]  Visit No.  1  * [facility]AL[facility] (INDIV DED IS MET FOR 2024) // 6600/13,500 (6047.97 OF INDIV DED IS MET FOR 2024) // 0 COPAY // 20% COINSUR // 100V CY PT/OT COMB (88V REMAIN) // PRIOR AUTH NOT REQ PER [email or web address] 11/18/24RC // Evonne confirmed 11/27/24 3:36pm     Subjective:    Current Episode  Date of Onset:  3 years ago  Mechanism of injury:  arthritic changes of the L hip over time  Chief Complaint:  L hip pain, weakness, stiffness  Progression of symptoms:  improving over time    Pain Score:  Pain Assessment: 0-10  Pain Assessment  Pain Assessment: 0-10  0-10 (Numeric) Pain Score: 6 (8/10 worst, 6/10 least)  Pain Type: Chronic pain  Pain Location: Hip  Pain Orientation: Left  Pain Descriptors: Aching  Pain Frequency: Constant/continuous  Pain Onset: Ongoing  Pain Type: Chronic pain  Pain Location: Hip  Pain Orientation: Left  Pain Descriptors: Aching  Pain Frequency: Constant/continuous    Better with:  Meds, ice    Worse with:  prolonged standing, walking, sitting, driving, getting in/out of car/bed/chair    Medical History/Surgical History:     Reviewed medical history form with patient (medications/allergies reviewed with patient).  Current Outpatient Medications   Medication Instructions   • acetaminophen (TYLENOL) 650 mg, oral, Every 6 hours PRN   • aspirin 81 mg, oral, 2 times daily   • chlorhexidine (Hibiclens) 4 % external liquid Wash for 5 days prior to surgery, including day of surgery   • chlorhexidine (Peridex) 0.12 % solution Rinse mouth with 15 mL (1 cap full) for 30 seconds, AM and PM after toothbrushing. Expectorate after rinsing, do not swallow. Use the night before and morning of surgery.   • cyclobenzaprine (FLEXERIL) 10 mg, oral, 3 times daily PRN   • docusate sodium (COLACE) 100 mg, oral, 2 times daily PRN    • ibuprofen (MOTRIN) 400 mg, oral, 4 times daily PRN   • L. rhamnosus GG/inulin (CULTURELLE DIGESTIVE HEALTH ORAL) 1 tablet, Daily   • multivitamin tablet 1 tablet, Daily     Radiology:    Exam Information    Status Exam Begun Exam Ended   Final 11/25/2024 13:47 11/25/2024 13:57     Study Result    Narrative & Impression   Interpreted By:  Jake Griggs III,   STUDY:  XR HIP LEFT WITH PELVIS WHEN PERFORMED 2 OR 3 VIEWS; ;  11/25/2024  1:57 pm      INDICATION:  Signs/Symptoms:pain.      ,M16.12 Unilateral primary osteoarthritis, left hip      COMPARISON:  None.      ACCESSION NUMBER(S):  [id number]      ORDERING CLINICIAN:  JAKE GRIGGS      FINDINGS:  AP pelvis lateral left hip: Status post left total hip arthroplasty  appropriately placed no paco-implant fracture or lucency. Well placed  right total hip arthroplasty      IMPRESSION:  Status post left total hip arthroplasty     Functional Assessment:  Level of Laurel:  Level of Laurel: Independent with ADLs and functional transfers    Social History:    Work Status:  UNEMPLOYED  Patient Awareness:  Patient is aware of HIS diagnosis and prognosis.  Social Support/History:  LIVES WITH FAMILY    Objective:    Weightbearing Status:  WBAT L E with cane assist    Any Pre-Cautions:    Avoid Hip Flexion Beyond 90 Degrees  Do not bend at the hip to reach down past your knees. This means avoiding bending forward too far, such as when putting on shoes or socks, or picking something up from the ground.  2. Avoid Internal Rotation  Do not turn the operated leg inward. Keep your toes pointing forward or slightly outwards, rather than letting your toes turn towards the center of your body.  3. Avoid Crossing Legs or Ankles  Do not cross your operated leg over the midline of your body, and avoid crossing your legs or ankles when sitting, standing, or lying down.  4. Avoid Combined Hip Flexion and Internal Rotation  Avoid positions that combine bending your hip  while turning it inward. An example to avoid would be sitting in a low chair and twisting to reach behind you.  5. Sleeping Precautions  When sleeping, do not lie on the operated side without support between your legs. A pillow between your legs can help maintain proper hip alignment. Some surgeons may recommend sleeping on your back with a pillow between your knees for a period.     Skin:  L hip surgical ARIANNA incision healing and closed, no active signs of infection.    Palpation:   mild point tenderness over L lateral hip.    Sensation:  Patient denies numbness/tingling of bilateral LOWER extremities.    Gait:  Mild antalgic gait L LE with cane    ROM:    L hip PROM  Flexion 80 degrees  Abduction 35 degrees  R hip AROM  Flexion 95 degrees  Abduction WNL's  ER WFL's  R knee AROM WNL's, L knee AROM WNL's, R ankle/foot AROM WNL's, and L ankle/foot AROM WNL's    Strength:    L hip flexion 4-/5 with pain  Abduction 3/5 with pain  ER not tested  R hip flexion 4+/5  Abduction 4+/5  ER 4/5  L knee flexion/extension 4/5 each  R knee 5/5 all planes, R ankle/foot 5/5 all planes, and L ankle/foot 5/5 all planes    Outcome measure  Lower Extremity Funtional Score (LEFS): 32/80     Treatment  Time in clinic started at  3:30pm  Time in clinic ended at  4:05pm  Total time in clinic is . 35 minutes  Total timed code time is  30 minutes    Treatment Performed Today:.   PT Initial Evaluation and Self-Care/Home Management HEP  Individual(s) Educated: Patient  Education Provided: Home Exercise Program  Diagnosis and Precautions: L hip osteoarthritis, s/p L ARIANNA Dell 11/12/2024  Risk and Benefits Discussed with Patient/Caregiver/Other: yes  Patient/Caregiver Demonstrated Understanding: yes  Plan of Care Discussed and Agreed Upon: yes  Patient Response to Education: Patient/Caregiver Verbalized Understanding of Information, Patient/Caregiver Performed Return Demonstration of Exercises/Activities    Patient instructed in a home exercise  program, has been given handouts for each of the exercises performed and was given another sheet instructing patient in the amount of reps to perform and the salbador to follow while doing the exercises     Access Code: [email or web address]  URL: [email or web address]  Date: 12/04/2024  Prepared by: Garret Dillard    Exercises  - Bent Knee Fallouts  - 1 x daily - 3-4 x weekly - 2 sets - 10 reps - 5-10 hold  - Supine Bridge  - 1 x daily - 3-4 x weekly - 2 sets - 10 reps - 5-10 hold  - Seated Long Arc Quad  - 1 x daily - 3-4 x weekly - 2 sets - 10 reps - 5-10 hold  - Seated March  - 1 x daily - 3-4 x weekly - 2 sets - 10 reps - 5-10 hold  - Standing Hip Extension with Counter Support  - 1 x daily - 3-4 x weekly - 2 sets - 10 reps - 5-10 hold  - Standing Hip Abduction with Counter Support  - 1 x daily - 3-4 x weekly - 2 sets - 10 reps - 5-10 hold  - Mini Squat with Counter Support  - 1 x daily - 3-4 x weekly - 2 sets - 10 reps - 5-10 hold         Current Participants as of 12/4/2024    Name Type Comments Contact Info    Garret Dillard, PT Physical Therapist  [phone]    Signature pending

## 2024-12-09 ENCOUNTER — TREATMENT (OUTPATIENT)
Dept: PHYSICAL THERAPY | Facility: CLINIC | Age: 54
End: 2024-12-09
Payer: COMMERCIAL

## 2024-12-09 DIAGNOSIS — M16.12 PRIMARY OSTEOARTHRITIS OF LEFT HIP: Primary | ICD-10-CM

## 2024-12-09 PROCEDURE — 97110 THERAPEUTIC EXERCISES: CPT | Mod: GP,CQ

## 2024-12-09 ASSESSMENT — PAIN - FUNCTIONAL ASSESSMENT: PAIN_FUNCTIONAL_ASSESSMENT: 0-10

## 2024-12-09 ASSESSMENT — PAIN SCALES - GENERAL: PAINLEVEL_OUTOF10: 6

## 2024-12-09 NOTE — PROGRESS NOTES
"Physical Therapy    Physical Therapy Treatment    Patient Name: Ramesh Julian  MRN: [id number]  Today's Date: 12/9/2024    Time Entry:   Time Calculation  Start Time: 0112  Stop Time: 0155  Time Calculation (min): 43 min     PT Therapeutic Procedures Time Entry  Therapeutic Exercise Time Entry: 40                 Insurance  Insurance reviewed  Name of Insurance:  [facility]  Visit No.  2/8  * (EVAL) [facility]R [facility]: [facility]450[facility]1350 D[facility] (INDIV DED IS MET FOR 2024) // 6600/13,500 (6047.97 OF INDIV DED IS MET FOR 2024) // 0 COPAY // 20% COINSUR // 100V CY PT/OT COMB (88V REMAIN) // PRIOR AUTH NOT REQ PER [email or web address] 11/18/24RC // Evonne confirmed 11/27/24 3:36pm       Assessment: Pt philip added there ex w/o issue and able to maintain hip precautions. No new c/o after tx except increased soreness as to be expected. Patient would benefit from P.T. to continue to address impairments in order to improve strength, flexibility, posture, and  to decrease symptoms and increase overall function.    PT Assessment  Evaluation/Treatment Tolerance: Patient tolerated treatment well, Patient limited by fatigue, Patient limited by pain      Plan: Progress as philip/ per protocol.  OP PT Plan  PT Plan: Skilled PT    Current Problem  1. Primary osteoarthritis of left hip            General     General  Reason for Referral: PT Evaluation  Referred By: Dr. Vaughn Adler  General Comment: L hip osteoarthritis, s/p L ARIANNA [facility] 11/12/2024    Subjective  Notes \"it's sore, but not too bad.\" Notes doing HEP and icing.    Precautions  Precautions  LE Weight Bearing Status: Weight Bearing as Tolerated  Post-Surgical Precautions: Left hip precautions (see eval)   Avoid Hip Flexion Beyond 90 Degrees  Do not bend at the hip to reach down past your knees. This means avoiding bending forward too far, such as when putting on shoes or socks, or picking something up from the ground.  2. Avoid Internal Rotation  Do " "not turn the operated leg inward. Keep your toes pointing forward or slightly outwards, rather than letting your toes turn towards the center of your body.  3. Avoid Crossing Legs or Ankles  Do not cross your operated leg over the midline of your body, and avoid crossing your legs or ankles when sitting, standing, or lying down.  4. Avoid Combined Hip Flexion and Internal Rotation  Avoid positions that combine bending your hip while turning it inward. An example to avoid would be sitting in a low chair and twisting to reach behind you.  5. Sleeping Precautions  When sleeping, do not lie on the operated side without support between your legs. A pillow between your legs can help maintain proper hip alignment. Some surgeons may recommend sleeping on your back with a pillow between your knees for a period.   LE Weight Bearing Status: Weight Bearing as Tolerated       Pain  Pain Assessment  Pain Assessment: 0-10  0-10 (Numeric) Pain Score: 6  Pain Type: Chronic pain  Pain Location: Hip  Pain Orientation: Left    Objective   Amb in to clinic w/SPC w/antalgic gait and decreased step length and stance time noted: vcs to correct.      Treatments:  Recumb Bike (SH 17) <90 deg L0 x5'  L Hip Flexor Stretch 3x30\"  L GS Stretch 3x30\"  Bent Knee Fallout   Supine Bridge x10  Supine flexion and  ABD w/slide board x10ea  LAQ 3# 2x10  HSC GTB x15  Supine March <90 deg x10ea  CKC B 3way hip x10ea  Mini Squats Step Ups x15  Standing March 2\"x15ea  Step Ups 4\" Fwd x10  Gait tx        OP EDUCATION:   Access Code: [email or web address]  URL: [email or web address]  Date: 12/04/2024  - Bent Knee Fallouts  - 1 x daily - 3-4 x weekly - 2 sets - 10 reps - 5-10 hold  - Supine Bridge  - 1 x daily - 3-4 x weekly - 2 sets - 10 reps - 5-10 hold  - Seated Long Arc Quad  - 1 x daily - 3-4 x weekly - 2 sets - 10 reps - 5-10 hold  - Seated March  - 1 x daily - 3-4 x weekly - 2 sets - 10 reps - 5-10 hold  - Standing Hip Extension with Counter " Support  - 1 x daily - 3-4 x weekly - 2 sets - 10 reps - 5-10 hold  - Standing Hip Abduction with Counter Support  - 1 x daily - 3-4 x weekly - 2 sets - 10 reps - 5-10 hold  - Mini Squat with Counter Support  - 1 x daily - 3-4 x weekly - 2 sets - 10 reps - 5-10 hold      Goals:   By the end of 8 visits patient will be able to do the following with < 0-1/10 L HIP pain:     HEP:  Patient will consistently perform HIS home exercise program for 20-30 minute sessions, 1-2x/day, 3-4 days/week independently by the end of 8 visits.     Basic ADL's:   Patient will perform bADL's/instrumental ADL's for 30 minutes moving between various closed kinetic chain postures.     ROM and Strength:  Patient will demonstrate 5/5 L HIP strength in all planes and WNL's L HIP AROM in all planes to improve their ability to lift, stand, ambulate and perform basic ADL's.     Stair Negotiation:  Patient will be able to ambulate up/down stairs for 1-2 flights at a time.     Gait/Locomotion:  Patient will be able to ambulate for 30-60 minutes at a time.  Minimal to no gait deviation across level ground/stairs.     Sleep:  Patient will sleep thru the night 4/7 nights/week.     Participation restrictions:  Increase LEFS to > or = to 55/80 for increased functional ability.     Pain:  Decrease pain at worst to < or = to 0-1/10 for improved QOL and ability to sleep.     No point tenderness noted over the L hip.

## 2024-12-11 ENCOUNTER — TREATMENT (OUTPATIENT)
Dept: PHYSICAL THERAPY | Facility: CLINIC | Age: 54
End: 2024-12-11
Payer: COMMERCIAL

## 2024-12-11 DIAGNOSIS — M16.12 PRIMARY OSTEOARTHRITIS OF LEFT HIP: Primary | ICD-10-CM

## 2024-12-11 PROCEDURE — 97110 THERAPEUTIC EXERCISES: CPT | Mod: GP,CQ

## 2024-12-11 ASSESSMENT — PAIN - FUNCTIONAL ASSESSMENT: PAIN_FUNCTIONAL_ASSESSMENT: 0-10

## 2024-12-11 ASSESSMENT — PAIN SCALES - GENERAL: PAINLEVEL_OUTOF10: 7

## 2024-12-11 NOTE — PROGRESS NOTES
"Physical Therapy    Physical Therapy Treatment    Patient Name: Ramesh Julian  MRN: [id number]  Today's Date: 12/11/2024    Time Entry:   Time Calculation  Start Time: 0232  Stop Time: 0313  Time Calculation (min): 41 min     PT Therapeutic Procedures Time Entry  Therapeutic Exercise Time Entry: 40                 Insurance  Insurance reviewed  Name of Insurance:  [facility]  Visit No.  3/8  * (EVAL) LEFT THR ; [facility]: [facility] (INDIV DED IS MET FOR 2024) // 6600/13,500 (6047.97 OF INDIV DED IS MET FOR 2024) // 0 COPAY // 20% COINSUR // 100V CY PT/OT COMB (88V REMAIN) // PRIOR AUTH NOT REQ PER [employer] 11/18/24RC // Evonne confirmed 11/27/24 3:36pm       Assessment: Pt philip added there ex w/o issue and able to maintain hip precautions. No new c/o after tx except increased soreness as to be expected. To ice at home per pt. Patient would benefit from P.T. to continue to address impairments in order to improve strength, flexibility, posture, and  to decrease symptoms and increase overall function.    PT Assessment  Evaluation/Treatment Tolerance: Patient tolerated treatment well      Plan: Progress as philip/ per protocol.  OP PT Plan  PT Plan: Skilled PT    Current Problem  1. Primary osteoarthritis of left hip            General     General  Reason for Referral: PT Evaluation  Referred By: Dr. Vaughn Adler  General Comment: L hip osteoarthritis, s/p L ARIANNA Dell 11/12/2024    Subjective  Notes  increased soreness since last visit, but not too bad today\" Notes doing HEP and icing.    Precautions  Precautions  LE Weight Bearing Status: Weight Bearing as Tolerated  Post-Surgical Precautions: Left hip precautions (see eval)   Avoid Hip Flexion Beyond 90 Degrees  Do not bend at the hip to reach down past your knees. This means avoiding bending forward too far, such as when putting on shoes or socks, or picking something up from the ground.  2. Avoid Internal Rotation  Do " "not turn the operated leg inward. Keep your toes pointing forward or slightly outwards, rather than letting your toes turn towards the center of your body.  3. Avoid Crossing Legs or Ankles  Do not cross your operated leg over the midline of your body, and avoid crossing your legs or ankles when sitting, standing, or lying down.  4. Avoid Combined Hip Flexion and Internal Rotation  Avoid positions that combine bending your hip while turning it inward. An example to avoid would be sitting in a low chair and twisting to reach behind you.  5. Sleeping Precautions  When sleeping, do not lie on the operated side without support between your legs. A pillow between your legs can help maintain proper hip alignment. Some surgeons may recommend sleeping on your back with a pillow between your knees for a period.   LE Weight Bearing Status: Weight Bearing as Tolerated       Pain  Pain Assessment  Pain Assessment: 0-10  0-10 (Numeric) Pain Score: 7  Pain Type: Chronic pain  Pain Location: Hip  Pain Orientation: Left      Objective   Amb in to clinic w/SPC w/antalgic gait and decreased step length and stance time noted: vcs to correct.      Treatments:  Recumb Bike (SH 17) <90 deg L0 x5'  L Hip Flexor Stretch 3x30\"  L GS Stretch 3x30\"  Bent Knee Fallout nt  Supine Bridge x15  Supine flexion and  ABD w/slide board x15ea  LAQ 3# 2x15  HSC GTB 2x15  Supine March <90 deg x10ea  CKC B 3way hip x15ea  Mini Squats Step Ups x15  Standing March 2\"x15ea  Step Ups 4\" Fwd/ lateral x10ea  Gait tx        OP EDUCATION:   Access Code: [email or web address]  URL: [email or web address]  Date: 12/04/2024  - Bent Knee Fallouts  - 1 x daily - 3-4 x weekly - 2 sets - 10 reps - 5-10 hold  - Supine Bridge  - 1 x daily - 3-4 x weekly - 2 sets - 10 reps - 5-10 hold  - Seated Long Arc Quad  - 1 x daily - 3-4 x weekly - 2 sets - 10 reps - 5-10 hold  - Seated March  - 1 x daily - 3-4 x weekly - 2 sets - 10 reps - 5-10 hold  - Standing Hip " Extension with Counter Support  - 1 x daily - 3-4 x weekly - 2 sets - 10 reps - 5-10 hold  - Standing Hip Abduction with Counter Support  - 1 x daily - 3-4 x weekly - 2 sets - 10 reps - 5-10 hold  - Mini Squat with Counter Support  - 1 x daily - 3-4 x weekly - 2 sets - 10 reps - 5-10 hold      Goals:   By the end of 8 visits patient will be able to do the following with < 0-1/10 L HIP pain:     HEP:  Patient will consistently perform HIS home exercise program for 20-30 minute sessions, 1-2x/day, 3-4 days/week independently by the end of 8 visits.     Basic ADL's:   Patient will perform bADL's/instrumental ADL's for 30 minutes moving between various closed kinetic chain postures.     ROM and Strength:  Patient will demonstrate 5/5 L HIP strength in all planes and WNL's L HIP AROM in all planes to improve their ability to lift, stand, ambulate and perform basic ADL's.     Stair Negotiation:  Patient will be able to ambulate up/down stairs for 1-2 flights at a time.     Gait/Locomotion:  Patient will be able to ambulate for 30-60 minutes at a time.  Minimal to no gait deviation across level ground/stairs.     Sleep:  Patient will sleep thru the night 4/7 nights/week.     Participation restrictions:  Increase LEFS to > or = to 55/80 for increased functional ability.     Pain:  Decrease pain at worst to < or = to 0-1/10 for improved QOL and ability to sleep.     No point tenderness noted over the L hip.

## 2024-12-16 ENCOUNTER — TREATMENT (OUTPATIENT)
Dept: PHYSICAL THERAPY | Facility: CLINIC | Age: 54
End: 2024-12-16
Payer: COMMERCIAL

## 2024-12-16 DIAGNOSIS — M16.12 PRIMARY OSTEOARTHRITIS OF LEFT HIP: Primary | ICD-10-CM

## 2024-12-16 PROCEDURE — 97110 THERAPEUTIC EXERCISES: CPT | Mod: GP,CQ

## 2024-12-16 ASSESSMENT — PAIN - FUNCTIONAL ASSESSMENT: PAIN_FUNCTIONAL_ASSESSMENT: 0-10

## 2024-12-16 ASSESSMENT — PAIN SCALES - GENERAL: PAINLEVEL_OUTOF10: 5 - MODERATE PAIN

## 2024-12-16 NOTE — PROGRESS NOTES
"Physical Therapy    Physical Therapy Treatment    Patient Name: Ramesh Julian  MRN: [id number]  Today's Date: 12/16/2024    Time Entry:   Time Calculation  Start Time: 0233  Stop Time: 0314  Time Calculation (min): 41 min     PT Therapeutic Procedures Time Entry  Therapeutic Exercise Time Entry: 40                 Insurance  Insurance reviewed  Name of Insurance:  [facility]  Visit No.  4/8  * (EVAL) [facility]R [facility]: [facility] (INDIV DED IS MET FOR 2024) // 6600/13,500 (6047.97 OF INDIV DED IS MET FOR 2024) // 0 COPAY // 20% COINSUR // 100V CY PT/OT COMB (88V REMAIN) // PRIOR AUTH NOT REQ PER [employer] 11/18/24RC // Evonne confirmed 11/27/24 3:36pm       Assessment: Pt philip added there ex w/o issue and able to maintain hip precautions. No new c/o after tx except increased soreness as to be expected. To ice at home per pt. Patient would benefit from P.T. to continue to address impairments in order to improve strength, flexibility, posture, and  to decrease symptoms and increase overall function.    PT Assessment  Evaluation/Treatment Tolerance: Patient tolerated treatment well      Plan: Progress as philip/ per protocol. Dr follow up 1/6/25.  OP PT Plan  PT Plan: Skilled PT    Current Problem  1. Primary osteoarthritis of left hip            General     General  Reason for Referral: PT Evaluation  Referred By: Dr. Vaughn Adler  General Comment: L hip osteoarthritis, s/p L ARIANNA Dell 11/12/2024    Subjective  Notes  increased soreness since last visit, but not too bad today\" Notes doing HEP and icing.    Precautions  Precautions  LE Weight Bearing Status: Weight Bearing as Tolerated  Post-Surgical Precautions: Left hip precautions (see eval)   Avoid Hip Flexion Beyond 90 Degrees  Do not bend at the hip to reach down past your knees. This means avoiding bending forward too far, such as when putting on shoes or socks, or picking something up from the ground.  2. Avoid " "Internal Rotation  Do not turn the operated leg inward. Keep your toes pointing forward or slightly outwards, rather than letting your toes turn towards the center of your body.  3. Avoid Crossing Legs or Ankles  Do not cross your operated leg over the midline of your body, and avoid crossing your legs or ankles when sitting, standing, or lying down.  4. Avoid Combined Hip Flexion and Internal Rotation  Avoid positions that combine bending your hip while turning it inward. An example to avoid would be sitting in a low chair and twisting to reach behind you.  5. Sleeping Precautions  When sleeping, do not lie on the operated side without support between your legs. A pillow between your legs can help maintain proper hip alignment. Some surgeons may recommend sleeping on your back with a pillow between your knees for a period.   LE Weight Bearing Status: Weight Bearing as Tolerated       Pain  Pain Assessment  Pain Assessment: 0-10  0-10 (Numeric) Pain Score: 5 - Moderate pain  Pain Type: Chronic pain  Pain Location: Hip  Pain Orientation: Left      Objective   Amb in to clinic w/SPC w/antalgic gait and decreased step length and stance time noted: vcs to correct.      Treatments:  Recumb Bike (SH 17) <90 deg L0 x5'  L Hip Flexor Stretch 3x30\"  L GS Stretch 3x30\"  Bent Knee Fallout nt  Supine Bridge x15  Supine flexion and ABD w/slide board x15ea  LAQ 3# 2x15  HSC GTB 2x15  Supine March <90 deg x10ea  CKC B 3way hip 2# x15ea  Mini Squats x15 nt  Sit-stand x15  F/L 6\" Step Ups x12ea  Standing March 2\"x15ea  Hip Hikes B 2x10ea  Gait tx        OP EDUCATION:   Access Code: [email or web address]  URL: [email or web address]  Date: 12/04/2024  - Bent Knee Fallouts  - 1 x daily - 3-4 x weekly - 2 sets - 10 reps - 5-10 hold  - Supine Bridge  - 1 x daily - 3-4 x weekly - 2 sets - 10 reps - 5-10 hold  - Seated Long Arc Quad  - 1 x daily - 3-4 x weekly - 2 sets - 10 reps - 5-10 hold  - Seated March  - 1 x daily - 3-4 x " weekly - 2 sets - 10 reps - 5-10 hold  - Standing Hip Extension with Counter Support  - 1 x daily - 3-4 x weekly - 2 sets - 10 reps - 5-10 hold  - Standing Hip Abduction with Counter Support  - 1 x daily - 3-4 x weekly - 2 sets - 10 reps - 5-10 hold  - Mini Squat with Counter Support  - 1 x daily - 3-4 x weekly - 2 sets - 10 reps - 5-10 hold      Goals:   By the end of 8 visits patient will be able to do the following with < 0-1/10 L HIP pain:     HEP:  Patient will consistently perform HIS home exercise program for 20-30 minute sessions, 1-2x/day, 3-4 days/week independently by the end of 8 visits.     Basic ADL's:   Patient will perform bADL's/instrumental ADL's for 30 minutes moving between various closed kinetic chain postures.     ROM and Strength:  Patient will demonstrate 5/5 L HIP strength in all planes and WNL's L HIP AROM in all planes to improve their ability to lift, stand, ambulate and perform basic ADL's.     Stair Negotiation:  Patient will be able to ambulate up/down stairs for 1-2 flights at a time.     Gait/Locomotion:  Patient will be able to ambulate for 30-60 minutes at a time.  Minimal to no gait deviation across level ground/stairs.     Sleep:  Patient will sleep thru the night 4/7 nights/week.     Participation restrictions:  Increase LEFS to > or = to 55/80 for increased functional ability.     Pain:  Decrease pain at worst to < or = to 0-1/10 for improved QOL and ability to sleep.     No point tenderness noted over the L hip.

## 2024-12-18 ENCOUNTER — TREATMENT (OUTPATIENT)
Dept: PHYSICAL THERAPY | Facility: CLINIC | Age: 54
End: 2024-12-18
Payer: COMMERCIAL

## 2024-12-18 DIAGNOSIS — M16.12 PRIMARY OSTEOARTHRITIS OF LEFT HIP: Primary | ICD-10-CM

## 2024-12-18 PROCEDURE — 97110 THERAPEUTIC EXERCISES: CPT | Mod: GP | Performed by: PHYSICAL THERAPIST

## 2024-12-18 ASSESSMENT — PAIN SCALES - GENERAL: PAINLEVEL_OUTOF10: 5 - MODERATE PAIN

## 2024-12-18 ASSESSMENT — PAIN - FUNCTIONAL ASSESSMENT: PAIN_FUNCTIONAL_ASSESSMENT: 0-10

## 2024-12-18 NOTE — PROGRESS NOTES
PHYSICAL THERAPY TREATMENT    Patient name:  Ramesh Julian    MRN:  [id number]    :  1970    Today's Date:  24    Time Calculation  Start Time: 1445  Stop Time: 1527  Time Calculation (min): 42 min     PT Therapeutic Procedures Time Entry  Therapeutic Exercise Time Entry: 38       Referral by:  Referred By: Dr. Vaughn Adler    Diagnoses:  Diagnosis and Precautions: L hip osteoarthritis, s/p L ARIANNA Dell 2024    Assessment/Plan:  Therapeutic exercise performed in order to improve L hip strength/ROM/mobility.  Patient requires increased tactile/verbal cues with exercise in order to reduce L hip stress/strain during the particular exercise performed.  Patient challenged with exercises performed in clinic secondary to L hip fatigue.   PT Assessment  PT Assessment Results: Decreased strength, Decreased range of motion, Decreased endurance, Impaired balance, Decreased mobility, Orthopedic restrictions, Pain  Rehab Prognosis: Good  OP PT Plan  PT Plan: Skilled PT  Rehab Potential: Good  Plan of Care Agreement: Patient    General Visit Information  PT  Visit  PT Received On: 24    General  Reason for Referral: PT Evaluation  Referred By: Dr. Vaughn Adler  General Comment: L hip osteoarthritis, s/p L ARIANNA Dell 2024    Insurance Reviewed  Name of insurance:  [facility]   Visit #:   5    Subjective:  Patient reports that his L hip is a little stiff today.    Precautions  LE Weight Bearing Status: Weight Bearing as Tolerated  Post-Surgical Precautions: Left hip precautions (see eval)    Pain:  Pain Assessment  Pain Assessment: 0-10  0-10 (Numeric) Pain Score: 5 - Moderate pain  Pain Type: Chronic pain  Pain Location: Hip  Pain Orientation: Left    Treatments    Therapeutic Exercise  Therapeutic Exercise Performed: Yes  Therapeutic Exercise Activity 1: Recumbent bike x 7 minutes, resistance 6.0  Therapeutic Exercise Activity 2: Marching in standing x 30 reps  Therapeutic Exercise  "Activity 3: Mini-Squats x 20 reps  Therapeutic Exercise Activity 4: Standing hip flexion stretch at step 30\"x4  Therapeutic Exercise Activity 5: Forward step-ups 6 inch x 20 reps  Therapeutic Exercise Activity 6: Lateral step-ups 6 inch x 20 reps  Therapeutic Exercise Activity 7: Standing hip flexion/extension/abduction x 20 reps R and L  Therapeutic Exercise Activity 8: Standing hip hikes x 20 reps  Therapeutic Exercise Activity 9: supine bridges x 30 reps  Therapeutic Exercise Activity 10: supine SLR flexion x 20 reps  Therapeutic Exercise Activity 11: side lying hip abduction x 20 reps  Therapeutic Exercise Activity 12: supine bent knee fallouts x 20 reps    Treatment  Time in clinic started at:   2:45pm  Time in clinic ended at:   3:27pm  Total time in clinic is:   42 minutes  Total timed code time is:  38 minutes    Treatment Performed Today:.   Therapeutic Exercise x 3 units  "

## 2025-01-06 ENCOUNTER — OFFICE VISIT (OUTPATIENT)
Dept: ORTHOPEDIC SURGERY | Facility: CLINIC | Age: 55
End: 2025-01-06
Payer: COMMERCIAL

## 2025-01-06 DIAGNOSIS — Z96.642 STATUS POST LEFT HIP REPLACEMENT: Primary | ICD-10-CM

## 2025-01-06 PROCEDURE — 99211 OFF/OP EST MAY X REQ PHY/QHP: CPT | Performed by: STUDENT IN AN ORGANIZED HEALTH CARE EDUCATION/TRAINING PROGRAM

## 2025-01-06 NOTE — PROGRESS NOTES
"Chief Complaint   Patient presents with    Left Hip - Post-op     Total hip arthoplasty TRISTAN 11/12/2024       History of Present Illness  Ramesh is a 54-year-old male presenting today for first postop follow-up after left total hip arthroplasty 11/12/2024.  Patient returns today endorsing mild pain.  Denies any numbness or tingling.  Has been ambulating with the assistance of a cane.  Has had a break from physical therapy since right before Tomasa but has been doing activities at home.  Does report some discomfort after shoveling the driveway couple weeks ago where he did experience some aching type pains in his lateral hip near the incision site and some \"prickling sensations\" these have become more intermittent coming and going.  Denies any serious numbness or tingling to the distal lower extremity.  No calf pain, No chest pain or shortness of breath.     Exam  Mild swelling thigh  Healthy incision, no erythema or drainage  Tolerates PROM and gentle log roll of hip without issues  + Plantar/dorsiflexion  Negative Stanislav test     X-Ray    Narrative & Impression   Interpreted By:  Jake Griggs III,   STUDY:  XR HIP LEFT WITH PELVIS WHEN PERFORMED 2 OR 3 VIEWS; ;  11/25/2024  1:57 pm      INDICATION:  Signs/Symptoms:pain.      ,M16.12 Unilateral primary osteoarthritis, left hip      COMPARISON:  None.      ACCESSION NUMBER(S):  [id number]      ORDERING CLINICIAN:  JAKE GRIGGS      FINDINGS:  AP pelvis lateral left hip: Status post left total hip arthroplasty  appropriately placed no paco-implant fracture or lucency. Well placed  right total hip arthroplasty      IMPRESSION:  Status post left total hip arthroplasty          MACRO:  None      Signed by: Jake Griggs III 11/25/2024 2:12 PM  Dictation workstation:   [id number]        Assessment  Patient status post left total hip arthroplasty 6 weeks out     Plan  Discussed analgesics, encouraging non-opioid modalities.  Encouraged ice, rest.  Discussed hip " precautions, DVT prophylaxis, and rehab  Discussed activities to avoid as well as importance of using pain as guide  Follow-up 6 weeks   XR at follow up : 2 view left hip    Harjit Simmons PA-C

## 2025-01-14 ENCOUNTER — TREATMENT (OUTPATIENT)
Dept: PHYSICAL THERAPY | Facility: CLINIC | Age: 55
End: 2025-01-14
Payer: COMMERCIAL

## 2025-01-14 DIAGNOSIS — M16.12 PRIMARY OSTEOARTHRITIS OF LEFT HIP: Primary | ICD-10-CM

## 2025-01-14 ASSESSMENT — PAIN SCALES - GENERAL: PAINLEVEL_OUTOF10: 3

## 2025-01-14 ASSESSMENT — PAIN - FUNCTIONAL ASSESSMENT: PAIN_FUNCTIONAL_ASSESSMENT: 0-10

## 2025-01-14 NOTE — PROGRESS NOTES
"PHYSICAL THERAPY TREATMENT    Patient name:  Ramesh Julian    MRN:  [id number]    :  1970    Today's Date:  25    Time Calculation  Start Time: 233  Stop Time: 316  Time Calculation (min): 43 min     PT Therapeutic Procedures Time Entry  Therapeutic Exercise Time Entry: 43       Referral by:  Referred By: Dr. Vaughn Adler    Diagnoses:   Diagnosis and Precautions: L hip osteoarthritis, s/p L ARIANNA [facility] 2024     Assessment/Plan:  Pt had no complaints of increased pain in left hip after exercises. Minimal muscle fatigue only as expected. Pt would benefit from PT to continue to address impairments in order to improve strength, flexibility, gait, and body mechanics and to decrease symptoms and increase overall function.   General Visit Information       General  Reason for Referral: PT Evaluation  Referred By: Dr. Vaughn Adler  General Comment: L hip osteoarthritis, s/p L ARIANNA [facility] 2024    Insurance Reviewed  Name of insurance:  [facility]   Visit #:   6  Total visits: 8    Subjective:   Pt reports minimal discomfort in left hip entering clinic. Doing well overall. Pt inquiring about returning to gym.   Precautions  LE Weight Bearing Status: Weight Bearing as Tolerated  Post-Surgical Precautions: Left hip precautions (see eval)    Pain:  Pain Assessment  Pain Assessment: 0-10  0-10 (Numeric) Pain Score: 3  Pain Type: Chronic pain  Pain Location: Hip  Pain Orientation: Left    Objective:  Reviewed hip precautions with patient and instructed to be mindful of these precautions when doing light work out at gym. Instructed patient to obtain clearance from MD prior to going in the pool.     Treatments  Recumbent bike x 6 min, resistance 6.0 (<90 hip flexion)  Standing march x 30 (< 90 deg hip fleixon)  Mini squats x 20   Standing hip flexor stretch at step 30 sec x 4  Fwd step up 6\" x 15  Lateral step up 6\" x 15  Standing hip flexion/extension/abduction x 20 B  Standing hip hikes x " 15  Supine bridges x 30 NT  Side lying hip abduction x 20 NT  Supine bent knee fallout x 20 NT   LAQ 3# 2x15  Hamstring curls GTB  x 15   Sit to stand    Treatment  Time in clinic started at:  2:33 pm  Time in clinic ended at:  3:16 pm  Total time in clinic is:   43 minutes  Total timed code time is:  43 minutes    Treatment Performed Today:.   Therapeutic Exercise x  3 units

## 2025-01-21 ENCOUNTER — TREATMENT (OUTPATIENT)
Dept: PHYSICAL THERAPY | Facility: CLINIC | Age: 55
End: 2025-01-21
Payer: COMMERCIAL

## 2025-01-21 DIAGNOSIS — M16.12 PRIMARY OSTEOARTHRITIS OF LEFT HIP: Primary | ICD-10-CM

## 2025-01-21 PROCEDURE — 97110 THERAPEUTIC EXERCISES: CPT | Mod: GP,CQ

## 2025-01-21 ASSESSMENT — PAIN SCALES - GENERAL: PAINLEVEL_OUTOF10: 3

## 2025-01-21 ASSESSMENT — PAIN - FUNCTIONAL ASSESSMENT: PAIN_FUNCTIONAL_ASSESSMENT: 0-10

## 2025-01-21 NOTE — PROGRESS NOTES
"PHYSICAL THERAPY TREATMENT    Patient name:  Ramesh Julian    MRN:  [id number]    :  1970    Today's Date:  25    Time Calculation  Start Time: 023  Stop Time: 315  Time Calculation (min): 45 min     PT Therapeutic Procedures Time Entry  Therapeutic Exercise Time Entry: 45       Referral by:  Referred By: Dr. Vaughn Adler    Diagnoses:   Diagnosis and Precautions: L hip osteoarthritis, s/p L ARIANNA Dell 2024     Assessment/Plan:  Pt had no complaints of increased pain following exercises. Minimal muscle soreness/fatigue only as expected. Pt would benefit from PT to continue to address impairments in order to improve strength, flexibility, gait, and body mechanics and to decrease symptoms and increase overall function.   General Visit Information       General  Referred By: Dr. Vaughn Adler  General Comment: L hip osteoarthritis, s/p L ARIANNA Dell 2024    Insurance Reviewed  Name of insurance:  [facility]   Visit #:   7  Total visits: 8    Subjective:   Pt reports minimal soreness in his left hip currently. He reports still having some difficulty getting comfortable at night to sleep. He does report ability to go up and down stairs reciprocally with the use of a hand rail.   Precautions  LE Weight Bearing Status: Weight Bearing as Tolerated  Post-Surgical Precautions: Left hip precautions (see eval)    Pain:  Pain Assessment  Pain Assessment: 0-10  0-10 (Numeric) Pain Score: 3  Pain Location: Hip  Pain Orientation: Left    Objective:  Pt ambulates with slight decrease in stance time/hip extension on left    Treatments  Recumbent bike x 7 min, resistance 6.0 (<90 hip flexion)  Standing march x 30 (< 90 deg hip fleixon)  Mini squats x 20   Standing hip flexor stretch at step 30 sec x 4  Fwd step up 6\" x 15  Lateral step up 6\" x 15  Standing hip flexion/extension/abduction x 15 B  Standing hip hikes x 15  Supine bridges x 30 NT  Side lying hip abduction x 20 NT  Supine bent knee " fallout x 20 NT   LAQ 3# 2x15  Hamstring curls GTB  x 15   Sit to stand  Side stepping in hallway 20 ft x 2   Dynamic march in hallway (<90) 20 ft x 2     Treatment  Time in clinic started at:  2:30 pm  Time in clinic ended at:  3:15 pm  Total time in clinic is:   45 minutes  Total timed code time is: 45 minutes    Treatment Performed Today:.   Therapeutic Exercise x  3 units

## 2025-01-28 ENCOUNTER — TREATMENT (OUTPATIENT)
Dept: PHYSICAL THERAPY | Facility: CLINIC | Age: 55
End: 2025-01-28
Payer: COMMERCIAL

## 2025-01-28 DIAGNOSIS — M16.12 PRIMARY OSTEOARTHRITIS OF LEFT HIP: Primary | ICD-10-CM

## 2025-01-28 PROCEDURE — 97110 THERAPEUTIC EXERCISES: CPT | Mod: GP | Performed by: PHYSICAL THERAPIST

## 2025-01-28 ASSESSMENT — PAIN SCALES - GENERAL: PAINLEVEL_OUTOF10: 2

## 2025-01-28 ASSESSMENT — PAIN - FUNCTIONAL ASSESSMENT: PAIN_FUNCTIONAL_ASSESSMENT: 0-10

## 2025-01-28 NOTE — PROGRESS NOTES
PHYSICAL THERAPY TREATMENT    Patient name:  Ramesh Julian    MRN:  [id number]    :  1970    Today's Date:  25    Time Calculation  Start Time: 1530  Stop Time: 1610  Time Calculation (min): 40 min     PT Therapeutic Procedures Time Entry  Therapeutic Exercise Time Entry: 38       Referral by:  Referred By: Dr. Vaughn Adler    Diagnoses:  Diagnosis and Precautions: L hip osteoarthritis, s/p L ARIANNA [facility] 2024    Goals:   By the end of 8 visits patient will be able to do the following with < 0-1/10 L HIP pain:    HEP:  Patient will consistently perform HIS home exercise program for 20-30 minute sessions, 1-2x/day, 3-4 days/week independently by the end of 8 visits.  Progressing    Basic ADL's:   Patient will perform bADL's/instrumental ADL's for 30 minutes moving between various closed kinetic chain postures.  Progressing    ROM and Strength:  Patient will demonstrate 5/5 L HIP strength in all planes and WNL's L HIP AROM in all planes to improve their ability to lift, stand, ambulate and perform basic ADL's.  Progressing    Stair Negotiation:  Patient will be able to ambulate up/down stairs for 1-2 flights at a time.   Progressing    Gait/Locomotion:  Patient will be able to ambulate for 30-60 minutes at a time.  Progressing  Minimal to no gait deviation across level ground/stairs.    Sleep:  Patient will sleep thru the night 4/7 nights/week.  Progressing    Participation restrictions:  Increase LEFS to > or = to 55/80 for increased functional ability.  Progressing    Pain:  Decrease pain at worst to < or = to 0-1/10 for improved QOL and ability to sleep.  Progressing    No point tenderness noted over the L hip.  Progressing    Assessment/Plan:  Patient comes into clinic today for PT Re-Evaluation secondary to being 11 weeks s/p L ARIANNA [facility] 2024.    Patient demonstrates fair progress with regards to his L hip rehab thus far.  Patient still requires skilled PT intervention in order to improve  functional activity level/gait pattern/strength/ROM and reduce overall pain.  Therapeutic exercise performed in order to improve L hip strength/ROM/mobility.  Patient requires increased tactile/verbal cues with exercise in order to reduce L hip stress/strain during the particular exercise performed.  Patient challenged with exercises performed in clinic secondary to L hip fatigue.   PT Assessment  PT Assessment Results: Decreased strength, Decreased range of motion, Decreased endurance, Impaired balance, Decreased mobility, Orthopedic restrictions, Pain  Rehab Prognosis: Good  OP PT Plan  Treatment/Interventions: Cryotherapy, Education/ Instruction, Electrical stimulation, Gait training, Manual therapy, Neuromuscular re-education, Self care/ home management, Therapeutic activities, Therapeutic exercises  PT Plan: Skilled PT  PT Frequency: 1 time per week  Duration: 4 visits  Certification Period Start Date: 01/28/25  Certification Period End Date: 04/28/25  Number of Treatments Authorized: 4 visits  Rehab Potential: Good  Plan of Care Agreement: Patient    General Visit Information  PT  Visit  PT Received On: 01/28/25    General  Reason for Referral: PT Evaluation  Referred By: Dr. Vaughn Adler  General Comment: L hip osteoarthritis, s/p L ARIANNA [facility] 11/12/2024    Insurance Reviewed  Name of insurance:  [facility]   Visit #:   8  [facility] // 6600/13,500 // 0 COPAY // 20% COINSUR // 100V CY PT/OT COMB // PRIOR AUTH NOT REQ PER [employer]URE[employer] // Evonne confirmed 1/26/25 3:45pm     Subjective:  Patient comes into clinic today for PT Re-Evaluation secondary to being 11 weeks s/p L ARIANNA [facility] 11/12/2024.  Patient reports that his L hip feels 60% improved.    Precautions  LE Weight Bearing Status: Weight Bearing as Tolerated  Post-Surgical Precautions: Left hip precautions (see eval)    Pain:  Pain Assessment  Pain Assessment: 0-10  0-10 (Numeric) Pain Score: 2 (5/10  worst, 1/10 least)  Pain Location: Hip  Pain Orientation: Left    Objective:    Weightbearing Status:  WBAT L LE    Any Pre-Cautions:    Avoid Hip Flexion Beyond 90 Degrees  Do not bend at the hip to reach down past your knees. This means avoiding bending forward too far, such as when putting on shoes or socks, or picking something up from the ground.  2. Avoid Internal Rotation  Do not turn the operated leg inward. Keep your toes pointing forward or slightly outwards, rather than letting your toes turn towards the center of your body.  3. Avoid Crossing Legs or Ankles  Do not cross your operated leg over the midline of your body, and avoid crossing your legs or ankles when sitting, standing, or lying down.  4. Avoid Combined Hip Flexion and Internal Rotation  Avoid positions that combine bending your hip while turning it inward. An example to avoid would be sitting in a low chair and twisting to reach behind you.  5. Sleeping Precautions  When sleeping, do not lie on the operated side without support between your legs. A pillow between your legs can help maintain proper hip alignment. Some surgeons may recommend sleeping on your back with a pillow between your knees for a period.     Skin:  L hip surgical ARIANNA incision healed and closed, no active signs of infection.    Palpation:   mild point tenderness over L lateral hip.    Sensation:  Patient denies numbness/tingling of bilateral LOWER extremities.    Gait:  Decreased L hip extension late stance phase    ROM:    L hip PROM  Flexion 90 degrees  Abduction 40 degrees  ER 20 degrees    Strength:    L hip flexion 4/5 with pain  Abduction 4-/5 with pain  ER 4-/5    Outcome measure  Lower Extremity Funtional Score (LEFS): 44/80     Treatments    Therapeutic Exercise  Therapeutic Exercise Performed: Yes  Therapeutic Exercise Activity 1: Recumbent bike x 7 minutes, resistance 6.0  Therapeutic Exercise Activity 2: Marching in standing x 30 reps  Therapeutic Exercise  Activity 3: Mini-Squats x 20 reps  Therapeutic Exercise Activity 4: Re-Evaluation  Therapeutic Exercise Activity 5: Forward step-ups 6 inch x 20 reps  Therapeutic Exercise Activity 6: Lateral step-ups 6 inch x 20 reps    Treatment  Time in clinic started at:   3:30pm  Time in clinic ended at:   4:10pm  Total time in clinic is:   40 minutes  Total timed code time is:  38 minutes    Treatment Performed Today:.   Therapeutic Exercise x 3 units

## 2025-01-28 NOTE — LETTER
January 28, 2025    Garret Dillard, PT  [address] [place], [address] [place] [address]    Patient: Ramesh Julian   YOB: 1970   Date of Visit: 1/28/2025       Dear Vaughn Adler MD  [address]   [facility], [address] [place] [address],  [place] [address]    The attached plan of care is being sent to you because your patient’s medical reimbursement requires that you certify the plan of care. Your signature is required to allow uninterrupted insurance coverage.      You may indicate your approval by signing below and faxing this form back to us at Dept Fax: [phone].    Please call Dept: [phone] with any questions or concerns.    Thank you for this referral,        Garret Dillard, PT  [place] [address] [facility]  [address] [facility] [address]    Payer: Payor: [facility] / Plan: [facility] / Product Type: *No Product type* /                                                                         Date:     Dear Garret Dillard, PT,     Re: Mr. Ramesh Julian, MRN:[id number]    I certify that I have reviewed the attached plan of care and it is medically necessary for Mr. Ramesh Julian (1970) who is under my care.          ______________________________________                    _________________  Provider name and credentials                                           Date and time                                                                                           Plan of Care 1/28/25   Effective from: 1/28/2025  Effective to: 4/28/2025    Plan ID: [id number]            Participants as of Finalize on 1/28/2025    Name Type Comments Contact Info    Vaughn Adler MD Surgeon  [phone]    Garret Dillard, PT Physical Therapist  [phone]       Last Plan Note     Author: Garret Dillard PT Status: Incomplete Last edited: 1/28/2025  3:30 PM       PHYSICAL  THERAPY TREATMENT    Patient name:  Ramesh Julian    MRN:  [id number]    :  1970    Today's Date:  25    Time Calculation  Start Time: 1530  Stop Time: 1610  Time Calculation (min): 40 min     PT Therapeutic Procedures Time Entry  Therapeutic Exercise Time Entry: 38       Referral by:  Referred By: Dr. Vaughn Adler    Diagnoses:  Diagnosis and Precautions: L hip osteoarthritis, s/p L ARIANNA [facility] 2024    Goals:   By the end of 8 visits patient will be able to do the following with < 0-1/10 L HIP pain:    HEP:  Patient will consistently perform HIS home exercise program for 20-30 minute sessions, 1-2x/day, 3-4 days/week independently by the end of 8 visits.  Progressing    Basic ADL's:   Patient will perform bADL's/instrumental ADL's for 30 minutes moving between various closed kinetic chain postures.  Progressing    ROM and Strength:  Patient will demonstrate 5/5 L HIP strength in all planes and WNL's L HIP AROM in all planes to improve their ability to lift, stand, ambulate and perform basic ADL's.  Progressing    Stair Negotiation:  Patient will be able to ambulate up/down stairs for 1-2 flights at a time.   Progressing    Gait/Locomotion:  Patient will be able to ambulate for 30-60 minutes at a time.  Progressing  Minimal to no gait deviation across level ground/stairs.    Sleep:  Patient will sleep thru the night 4/7 nights/week.  Progressing    Participation restrictions:  Increase LEFS to > or = to 55/80 for increased functional ability.  Progressing    Pain:  Decrease pain at worst to < or = to 0-1/10 for improved QOL and ability to sleep.  Progressing    No point tenderness noted over the L hip.  Progressing    Assessment/Plan:  Patient comes into clinic today for PT Re-Evaluation secondary to being 11 weeks s/p L ARIANNA [facility] 2024.    Patient demonstrates fair progress with regards to his L hip rehab thus far.  Patient still requires skilled PT intervention in order to improve functional  activity level/gait pattern/strength/ROM and reduce overall pain.  Therapeutic exercise performed in order to improve L hip strength/ROM/mobility.  Patient requires increased tactile/verbal cues with exercise in order to reduce L hip stress/strain during the particular exercise performed.  Patient challenged with exercises performed in clinic secondary to L hip fatigue.   PT Assessment  PT Assessment Results: Decreased strength, Decreased range of motion, Decreased endurance, Impaired balance, Decreased mobility, Orthopedic restrictions, Pain  Rehab Prognosis: Good  OP PT Plan  Treatment/Interventions: Cryotherapy, Education/ Instruction, Electrical stimulation, Gait training, Manual therapy, Neuromuscular re-education, Self care/ home management, Therapeutic activities, Therapeutic exercises  PT Plan: Skilled PT  PT Frequency: 1 time per week  Duration: 4 visits  Certification Period Start Date: 01/28/25  Certification Period End Date: 04/28/25  Number of Treatments Authorized: 4 visits  Rehab Potential: Good  Plan of Care Agreement: Patient    General Visit Information  PT  Visit  PT Received On: 01/28/25    General  Reason for Referral: PT Evaluation  Referred By: Dr. Vaughn Adler  General Comment: L hip osteoarthritis, s/p L ARIANNA [facility] 11/12/2024    Insurance Reviewed  Name of insurance:  [facility]   Visit #:   8  [facility] // 6600/13,500 // 0 COPAY // 20% COINSUR // 100V CY PT/OT COMB // PRIOR AUTH NOT REQ PER [email or web address] // Evonne confirmed 1/26/25 3:45pm     Subjective:  Patient comes into clinic today for PT Re-Evaluation secondary to being 11 weeks s/p L ARIANNA Dell 11/12/2024.  Patient reports that his L hip feels 60% improved.    Precautions  LE Weight Bearing Status: Weight Bearing as Tolerated  Post-Surgical Precautions: Left hip precautions (see eval)    Pain:  Pain Assessment  Pain Assessment: 0-10  0-10 (Numeric) Pain Score: 2 (5/10 worst, 1/10  least)  Pain Location: Hip  Pain Orientation: Left    Objective:    Weightbearing Status:  WBAT L LE    Any Pre-Cautions:    Avoid Hip Flexion Beyond 90 Degrees  Do not bend at the hip to reach down past your knees. This means avoiding bending forward too far, such as when putting on shoes or socks, or picking something up from the ground.  2. Avoid Internal Rotation  Do not turn the operated leg inward. Keep your toes pointing forward or slightly outwards, rather than letting your toes turn towards the center of your body.  3. Avoid Crossing Legs or Ankles  Do not cross your operated leg over the midline of your body, and avoid crossing your legs or ankles when sitting, standing, or lying down.  4. Avoid Combined Hip Flexion and Internal Rotation  Avoid positions that combine bending your hip while turning it inward. An example to avoid would be sitting in a low chair and twisting to reach behind you.  5. Sleeping Precautions  When sleeping, do not lie on the operated side without support between your legs. A pillow between your legs can help maintain proper hip alignment. Some surgeons may recommend sleeping on your back with a pillow between your knees for a period.     Skin:  L hip surgical ARIANNA incision healed and closed, no active signs of infection.    Palpation:   mild point tenderness over L lateral hip.    Sensation:  Patient denies numbness/tingling of bilateral LOWER extremities.    Gait:  Decreased L hip extension late stance phase    ROM:    L hip PROM  Flexion 90 degrees  Abduction 40 degrees  ER 20 degrees    Strength:    L hip flexion 4/5 with pain  Abduction 4-/5 with pain  ER 4-/5    Outcome measure  Lower Extremity Funtional Score (LEFS): 44/80     Treatments    Therapeutic Exercise  Therapeutic Exercise Performed: Yes  Therapeutic Exercise Activity 1: Recumbent bike x 7 minutes, resistance 6.0  Therapeutic Exercise Activity 2: Marching in standing x 30 reps  Therapeutic Exercise Activity 3:  Mini-Squats x 20 reps  Therapeutic Exercise Activity 4: Re-Evaluation  Therapeutic Exercise Activity 5: Forward step-ups 6 inch x 20 reps  Therapeutic Exercise Activity 6: Lateral step-ups 6 inch x 20 reps    Treatment  Time in clinic started at:   3:30pm  Time in clinic ended at:   4:10pm  Total time in clinic is:   40 minutes  Total timed code time is:  38 minutes    Treatment Performed Today:.   Therapeutic Exercise x 3 units         Current Participants as of 1/28/2025    Name Type Comments Contact Info    Vaughn Adler MD Surgeon  [phone]    Signature pending    Garret Dillard PT Physical Therapist  [phone]    Signature pending

## 2025-02-17 ENCOUNTER — OFFICE VISIT (OUTPATIENT)
Dept: ORTHOPEDIC SURGERY | Facility: CLINIC | Age: 55
End: 2025-02-17
Payer: COMMERCIAL

## 2025-02-17 ENCOUNTER — HOSPITAL ENCOUNTER (OUTPATIENT)
Dept: RADIOLOGY | Facility: CLINIC | Age: 55
Discharge: HOME | End: 2025-02-17
Payer: COMMERCIAL

## 2025-02-17 DIAGNOSIS — Z96.642 STATUS POST LEFT HIP REPLACEMENT: Primary | ICD-10-CM

## 2025-02-17 DIAGNOSIS — Z96.642 STATUS POST LEFT HIP REPLACEMENT: ICD-10-CM

## 2025-02-17 DIAGNOSIS — M70.72 BURSITIS OF LEFT HIP, UNSPECIFIED BURSA: ICD-10-CM

## 2025-02-17 PROCEDURE — 99213 OFFICE O/P EST LOW 20 MIN: CPT | Performed by: STUDENT IN AN ORGANIZED HEALTH CARE EDUCATION/TRAINING PROGRAM

## 2025-02-17 PROCEDURE — 73502 X-RAY EXAM HIP UNI 2-3 VIEWS: CPT | Mod: LT

## 2025-02-17 PROCEDURE — 73502 X-RAY EXAM HIP UNI 2-3 VIEWS: CPT | Mod: LEFT SIDE | Performed by: STUDENT IN AN ORGANIZED HEALTH CARE EDUCATION/TRAINING PROGRAM

## 2025-02-17 RX ORDER — METHYLPREDNISOLONE 4 MG/1
TABLET ORAL
Qty: 1 EACH | Refills: 0 | Status: SHIPPED | OUTPATIENT
Start: 2025-02-17

## 2025-02-17 NOTE — PROGRESS NOTES
Chief Complaint   Patient presents with    Left Hip - Follow-up     Total hip arthoplasty TRISTAN 11/12/2024       History of Present Illness  Ramesh is a 54-year-old male presenting today for first postop follow-up after left total hip arthroplasty 11/12/2024.  Patient returns today endorsing mild pain.  Denies any numbness or tingling.  Has been ambulating with the assistance of a cane.  Has been able to tolerate more activities however is still having persistent lateral based hip pain.  Denies any serious numbness or tingling to the distal lower extremity.  No calf pain, No chest pain or shortness of breath.     Exam  Mild swelling thigh  Well-healed incision, no erythema or drainage  Tolerates PROM and gentle log roll of hip with moderate pain  + Plantar/dorsiflexion  Negative Stanislav test  Exquisite tenderness outpatient lateral hip     X-Ray   Narrative & Impression   Interpreted By:  Jake Griggs III,   STUDY:  XR HIP LEFT WITH PELVIS WHEN PERFORMED 2 OR 3 VIEWS; ;  2/17/2025  3:45 pm      INDICATION:  Signs/Symptoms:pain.      ,Z96.642 Presence of left artificial hip joint      COMPARISON:  None.      ACCESSION NUMBER(S):  [id number]      ORDERING CLINICIAN:  JAKE GRIGGS      FINDINGS:  Two views left hip: Status post left total hip arthroplasty  appropriately placed no paco-implant fracture or lucency      IMPRESSION:  Status post left total hip arthroplasty          MACRO:  None      Signed by: Jake Griggs III 2/17/2025 3:51 PM  Dictation workstation:   [id number]      Narrative & Impression   Interpreted By:  Jake Griggs III,   STUDY:  XR HIP LEFT WITH PELVIS WHEN PERFORMED 2 OR 3 VIEWS; ;  11/25/2024  1:57 pm      INDICATION:  Signs/Symptoms:pain.      ,M16.12 Unilateral primary osteoarthritis, left hip      COMPARISON:  None.      ACCESSION NUMBER(S):  [id number]      ORDERING CLINICIAN:  JAKE GRIGGS      FINDINGS:  AP pelvis lateral left hip: Status post left total hip  arthroplasty  appropriately placed no paco-implant fracture or lucency. Well placed  right total hip arthroplasty      IMPRESSION:  Status post left total hip arthroplasty          MACRO:  None      Signed by: Vaughn Adler III 11/25/2024 2:12 PM  Dictation workstation:   [id number]        Assessment  Patient status post left total hip arthroplasty 3 months out now with greater trochanteric bursitis     Plan  Discussed analgesics, encouraging non-opioid modalities.  Encouraged ice, rest.  Discussed hip precautions, DVT prophylaxis, and rehab  Discussed activities to avoid as well as importance of using pain as guide  Discussed modalities including oral Medrol Dosepak versus cortisone injection as well as risk benefits contraindications and alternatives to each.  Patient elects to proceed with a Medrol Dosepak  Follow-up 2 months      Harjit Simmons PA-C    In a face to face encounter, I evaluated the patient and performed a physical examination, discussed pertinent diagnostic studies if indicated and discussed diagnosis and management strategies with both the patient and physician assistant / nurse practitioner.  I reviewed the PA/NP's note and agree with the documented findings and plan of care.     84-year-old male status post left total hip arthroplasty 5 months prior.  Has had some laterally based hip pain as of late.  No recent history of trauma has been working with physical therapy.  On exam he is exquisite tenderness palpation about the hip bursa.  54-year-old male with concerns for left hip trochanteric bursitis status post left total hip arthroplasty 3 months prior.  Will provide a prescription for oral Medrol Dosepak.  He will follow-up in 2 months time for assessment of progress we will provide a handicap placard today.  If fails to have significant relief may benefit from a hip bursa injection    Vaughn Adler III, MD

## 2025-02-20 ENCOUNTER — TREATMENT (OUTPATIENT)
Dept: PHYSICAL THERAPY | Facility: CLINIC | Age: 55
End: 2025-02-20
Payer: COMMERCIAL

## 2025-02-20 DIAGNOSIS — M16.12 PRIMARY OSTEOARTHRITIS OF LEFT HIP: Primary | ICD-10-CM

## 2025-02-20 PROCEDURE — 97110 THERAPEUTIC EXERCISES: CPT | Mod: GP,CQ

## 2025-02-20 ASSESSMENT — PAIN - FUNCTIONAL ASSESSMENT: PAIN_FUNCTIONAL_ASSESSMENT: 0-10

## 2025-02-20 ASSESSMENT — PAIN SCALES - GENERAL: PAINLEVEL_OUTOF10: 4

## 2025-02-20 NOTE — PROGRESS NOTES
"PHYSICAL THERAPY TREATMENT    Patient name:  Ramesh Julian    MRN:  [id number]    :  1970    Today's Date:  25    Time Calculation  Start Time: 317  Stop Time: 400  Time Calculation (min): 43 min     PT Therapeutic Procedures Time Entry  Therapeutic Exercise Time Entry: 38       Referral by:  Referred By: Dr. Vaughn Adler    Diagnoses:  Diagnosis and Precautions: L hip osteoarthritis, s/p L ARIANNA [facility] 2024        Assessment/Plan:  Decreased overall ther ex secondary to bursitis per pt. Therapeutic exercise performed in order to improve L hip strength/ROM/mobility. Pt reports \"it's about the same\" after tx. Patient challenged with exercises performed in clinic secondary to L hip fatigue.  Dr's notes just said \"discussed hip precautions\" need to message Dr to see if any change in hip precautions. Pt at 12 weeks and 2 days post-op. Dr follow up 25.  PT Assessment  Evaluation/Treatment Tolerance: Patient tolerated treatment well  OP PT Plan  PT Plan: Skilled PT    General Visit Information       General  Reason for Referral: PT Evaluation  Referred By: Dr. Vaughn Adler  General Comment: L hip osteoarthritis, s/p L ARIANNA [facility] 2024    Insurance Reviewed  Name of insurance:  [facility]   Visit #:   10  [facility] // 6600/13,500 // 0 COPAY // 20% COINSUR // 100V CY PT/OT COMB // PRIOR AUTH NOT REQ PER [email or web address] // Evonne confirmed 25 3:45pm     Subjective:  Patient comes into clinic today noting that the Dr said he has bursitis and started a steroid pack on Tuesday. \"It's better today than Monday.\" Notes the pain is on the lateral hip.    Precautions  LE Weight Bearing Status: Weight Bearing as Tolerated  Post-Surgical Precautions: Left hip precautions (see eval)    Pain:  Pain Assessment  Pain Assessment: 0-10  0-10 (Numeric) Pain Score: 4  Pain Location: Hip  Pain Orientation: Left    Objective:    Weightbearing Status:  WBAT " "L LE  Amb w/ antalgic gait w/SPC.    Treatments  Recumbent bike (SH 15) x 6 min, resistance 6.0 (<90 hip flexion)  Standing march 2\" x 30 (< 90 deg hip fleixon)  Mini squats x 20   Heel Raises  Standing hip flexor stretch at step 30 sec x 4  Fwd step up 6\" x 15 NT  Lateral step up 6\" x 15 NT   Standing hip flexion/extension/abduction x 15 B ( NO ABD this date secondary to increase pain as of recent).  Standing hip hikes x 15 NT  Supine bridges 2x 15  Side lying hip abduction x 20 NT  Supine bent knee fallout x 20 NT  LAQ 4# 2x15  Hamstring curls BTB  x 20  Sit to stand NT  Side stepping in hallway 20 ft x 2  NT  Dynamic march in hallway (<90) 20 ft x 2  NT  DLS March x10ea  DLS w/KO x10ea    Goals:   By the end of 8 visits patient will be able to do the following with < 0-1/10 L HIP pain:    HEP:  Patient will consistently perform HIS home exercise program for 20-30 minute sessions, 1-2x/day, 3-4 days/week independently by the end of 8 visits.  Progressing    Basic ADL's:   Patient will perform bADL's/instrumental ADL's for 30 minutes moving between various closed kinetic chain postures.  Progressing    ROM and Strength:  Patient will demonstrate 5/5 L HIP strength in all planes and WNL's L HIP AROM in all planes to improve their ability to lift, stand, ambulate and perform basic ADL's.  Progressing    Stair Negotiation:  Patient will be able to ambulate up/down stairs for 1-2 flights at a time.   Progressing    Gait/Locomotion:  Patient will be able to ambulate for 30-60 minutes at a time.  Progressing  Minimal to no gait deviation across level ground/stairs.    Sleep:  Patient will sleep thru the night 4/7 nights/week.  Progressing    Participation restrictions:  Increase LEFS to > or = to 55/80 for increased functional ability.  Progressing    Pain:  Decrease pain at worst to < or = to 0-1/10 for improved QOL and ability to sleep.  Progressing    No point tenderness noted over the L hip.  Progressing  "

## 2025-02-24 DIAGNOSIS — Z96.642 STATUS POST LEFT HIP REPLACEMENT: Primary | ICD-10-CM

## 2025-02-24 RX ORDER — AMOXICILLIN 500 MG/1
CAPSULE ORAL
Qty: 4 CAPSULE | Refills: 1 | Status: SHIPPED | OUTPATIENT
Start: 2025-02-24

## 2025-02-27 ENCOUNTER — TREATMENT (OUTPATIENT)
Dept: PHYSICAL THERAPY | Facility: CLINIC | Age: 55
End: 2025-02-27
Payer: COMMERCIAL

## 2025-02-27 DIAGNOSIS — M16.12 PRIMARY OSTEOARTHRITIS OF LEFT HIP: Primary | ICD-10-CM

## 2025-02-27 PROCEDURE — 97110 THERAPEUTIC EXERCISES: CPT | Mod: GP | Performed by: PHYSICAL THERAPIST

## 2025-02-27 ASSESSMENT — PAIN SCALES - GENERAL: PAINLEVEL_OUTOF10: 3

## 2025-02-27 ASSESSMENT — PAIN - FUNCTIONAL ASSESSMENT: PAIN_FUNCTIONAL_ASSESSMENT: 0-10

## 2025-02-27 NOTE — PROGRESS NOTES
PHYSICAL THERAPY TREATMENT    Patient name:  Ramesh Julian    MRN:  [id number]    :  1970    Today's Date:  25    Time Calculation  Start Time: 1530  Stop Time: 1613  Time Calculation (min): 43 min     PT Therapeutic Procedures Time Entry  Therapeutic Exercise Time Entry: 38       Referral by:  Referred By: Dr. Vaughn Adler    Diagnoses:  Diagnosis and Precautions: L hip osteoarthritis, s/p L ARIANNA [facility] 2024    Assessment/Plan:  Therapeutic exercise performed in order to improve L hip strength/ROM/mobility.  Patient requires increased tactile/verbal cues with exercise in order to reduce L hip stress/strain during the particular exercise performed.  Patient challenged with exercises performed in clinic secondary to L hip fatigue.   PT Assessment  PT Assessment Results: Decreased strength, Decreased range of motion, Decreased endurance, Impaired balance, Decreased mobility, Orthopedic restrictions, Pain  Rehab Prognosis: Good  Evaluation/Treatment Tolerance: Patient tolerated treatment well  OP PT Plan  PT Plan: Skilled PT  Rehab Potential: Good  Plan of Care Agreement: Patient    General Visit Information  PT  Visit  PT Received On: 25    General  Reason for Referral: PT Evaluation  Referred By: Dr. Vaughn Adler  General Comment: L hip osteoarthritis, s/p L ARIANNA [facility] 2024    Insurance Reviewed  Name of insurance:  [facility]   [facility] #:   10  [facility] // 6600/13,500 // 0 COPAY // 20% COINSUR // 100V CY PT/OT COMB // PRIOR AUTH NOT REQ PER [email or web address] // Evonne confirmed 25 3:45pm     Subjective:  Patient reports that his L hip just feels a little sore and stiff.    Precautions  LE Weight Bearing Status: Weight Bearing as Tolerated  Post-Surgical Precautions: Left hip precautions (see eval)    Pain:  Pain Assessment  Pain Assessment: 0-10  0-10 (Numeric) Pain Score: 3  Pain Location: Hip  Pain Orientation:  "Left    Treatments    Therapeutic Exercise  Therapeutic Exercise Performed: Yes  Therapeutic Exercise Activity 1: Recumbent bike x 7 minutes, resistance 6.0  Therapeutic Exercise Activity 2: Standing HS stretch 30\"x5 at steps  Therapeutic Exercise Activity 3: Standing piriformis stretch at table 30\"x5  Therapeutic Exercise Activity 4: Mini-Squats x 30 reps  Therapeutic Exercise Activity 5: Forward step-ups 6 inch x 30 reps  Therapeutic Exercise Activity 6: Lateral step-ups 6 inch x 20 reps  Therapeutic Exercise Activity 7: Standing hip flexion/extension/abduction x 30 reps R and L  Therapeutic Exercise Activity 8: Standing hip hikes x 20 reps  Therapeutic Exercise Activity 9: supine L hip ER stretch 20\"x10 reps    Treatment  Time in clinic started at:   3:30pm  Time in clinic ended at:   4:13pm  Total time in clinic is:    43 minutes  Total timed code time is:  38 minutes    Treatment Performed Today:.   Therapeutic Exercise x 3 units  "

## 2025-03-06 ENCOUNTER — TREATMENT (OUTPATIENT)
Dept: PHYSICAL THERAPY | Facility: CLINIC | Age: 55
End: 2025-03-06
Payer: COMMERCIAL

## 2025-03-06 DIAGNOSIS — M16.12 PRIMARY OSTEOARTHRITIS OF LEFT HIP: Primary | ICD-10-CM

## 2025-03-06 PROCEDURE — 97110 THERAPEUTIC EXERCISES: CPT | Mod: GP | Performed by: PHYSICAL THERAPIST

## 2025-03-06 ASSESSMENT — PAIN SCALES - GENERAL: PAINLEVEL_OUTOF10: 2

## 2025-03-06 ASSESSMENT — PAIN - FUNCTIONAL ASSESSMENT: PAIN_FUNCTIONAL_ASSESSMENT: 0-10

## 2025-03-06 NOTE — PROGRESS NOTES
PHYSICAL THERAPY TREATMENT    Patient name:  Ramesh Julian    MRN:  [id number]    :  1970    Today's Date:  25    Time Calculation  Start Time: 1535  Stop Time: 1613  Time Calculation (min): 38 min     PT Therapeutic Procedures Time Entry  Therapeutic Exercise Time Entry: 38       Referral by:  Referred By: Dr. Vaughn Adler    Diagnoses:  Diagnosis and Precautions: L hip osteoarthritis, s/p L ARIANNA [facility] 2024    Assessment/Plan:  Therapeutic exercise performed in order to improve L hip strength/ROM/mobility.  Patient requires increased tactile/verbal cues with exercise in order to reduce L hip stress/strain during the particular exercise performed.  Patient challenged with exercises performed in clinic secondary to L hip fatigue.   PT Assessment  PT Assessment Results: Decreased strength, Decreased range of motion, Decreased endurance, Impaired balance, Decreased mobility, Orthopedic restrictions, Pain  Rehab Prognosis: Good  Evaluation/Treatment Tolerance: Patient tolerated treatment well  OP PT Plan  PT Plan: Skilled PT  Rehab Potential: Good  Plan of Care Agreement: Patient    General Visit Information  PT  Visit  PT Received On: 25    General  Reason for Referral: PT Evaluation  Referred By: Dr. Vaughn Adler  General Comment: L hip osteoarthritis, s/p L ARIANNA [facility] 2024    Insurance Reviewed  Name of insurance:  [facility]   Visit #:   11  [facility] // 6600/13,500 // 0 COPAY // 20% COINSUR // 100V CY PT/OT COMB // PRIOR AUTH NOT REQ PER [email or web address] // Evonne confirmed 25 3:45pm     Subjective:  Patient reports that his L hip is feeling better.    Precautions  LE Weight Bearing Status: Weight Bearing as Tolerated  Post-Surgical Precautions: Left hip precautions (see eval)    Pain:  Pain Assessment  Pain Assessment: 0-10  0-10 (Numeric) Pain Score: 2  Pain Location: Hip  Pain Orientation: Left    Treatments    Therapeutic  "Exercise  Therapeutic Exercise Performed: Yes  Therapeutic Exercise Activity 1: Recumbent bike x 7 minutes, resistance 7.0  Therapeutic Exercise Activity 2: Standing HS stretch 30\"x5 at steps  Therapeutic Exercise Activity 3: Standing piriformis stretch at table 30\"x5  Therapeutic Exercise Activity 4: Mini-Squats x 30 reps  Therapeutic Exercise Activity 5: Forward step-ups 6 inch x 30 reps  Therapeutic Exercise Activity 6: Lateral step-ups 6 inch x 20 reps  Therapeutic Exercise Activity 7: Standing hip flexion/extension/abduction x 30 reps R and L  Therapeutic Exercise Activity 8: Standing hip hikes x 30 reps  Therapeutic Exercise Activity 9: supine L hip ER stretch 20\"x10 reps  Therapeutic Exercise Activity 10: supine bridges x 30 reps    Treatment  Time in clinic started at:   3:35pm  Time in clinic ended at:   4:13pm  Total time in clinic is:     38 minutes  Total timed code time is:  38 minutes    Treatment Performed Today:.   Therapeutic Exercise x 3 units  "

## 2025-03-07 ENCOUNTER — TELEPHONE (OUTPATIENT)
Dept: PRIMARY CARE | Facility: CLINIC | Age: 55
End: 2025-03-07
Payer: COMMERCIAL

## 2025-03-10 ENCOUNTER — APPOINTMENT (OUTPATIENT)
Dept: PRIMARY CARE | Facility: CLINIC | Age: 55
End: 2025-03-10
Payer: COMMERCIAL

## 2025-03-10 VITALS
BODY MASS INDEX: 30.45 KG/M2 | SYSTOLIC BLOOD PRESSURE: 164 MMHG | WEIGHT: 183 LBS | DIASTOLIC BLOOD PRESSURE: 88 MMHG | HEART RATE: 76 BPM | TEMPERATURE: 97.4 F

## 2025-03-10 DIAGNOSIS — I10 PRIMARY HYPERTENSION: Primary | ICD-10-CM

## 2025-03-10 DIAGNOSIS — R53.83 FATIGUE, UNSPECIFIED TYPE: ICD-10-CM

## 2025-03-10 DIAGNOSIS — Z12.11 SCREENING FOR COLON CANCER: ICD-10-CM

## 2025-03-10 DIAGNOSIS — E78.2 MIXED HYPERLIPIDEMIA: ICD-10-CM

## 2025-03-10 DIAGNOSIS — Z12.5 PROSTATE CANCER SCREENING: ICD-10-CM

## 2025-03-10 PROCEDURE — 99214 OFFICE O/P EST MOD 30 MIN: CPT | Performed by: INTERNAL MEDICINE

## 2025-03-10 PROCEDURE — 3077F SYST BP >= 140 MM HG: CPT | Performed by: INTERNAL MEDICINE

## 2025-03-10 PROCEDURE — 3079F DIAST BP 80-89 MM HG: CPT | Performed by: INTERNAL MEDICINE

## 2025-03-10 RX ORDER — LOSARTAN POTASSIUM 25 MG/1
25 TABLET ORAL DAILY
Qty: 90 TABLET | Refills: 1 | Status: SHIPPED | OUTPATIENT
Start: 2025-03-10 | End: 2026-04-14

## 2025-03-10 ASSESSMENT — ENCOUNTER SYMPTOMS
RESPIRATORY NEGATIVE: 1
MUSCULOSKELETAL NEGATIVE: 1
CARDIOVASCULAR NEGATIVE: 1
PSYCHIATRIC NEGATIVE: 1
ALLERGIC/IMMUNOLOGIC NEGATIVE: 1
CONSTITUTIONAL NEGATIVE: 1
NEUROLOGICAL NEGATIVE: 1
EYES NEGATIVE: 1
GASTROINTESTINAL NEGATIVE: 1
HEMATOLOGIC/LYMPHATIC NEGATIVE: 1
ENDOCRINE NEGATIVE: 1

## 2025-03-10 ASSESSMENT — PATIENT HEALTH QUESTIONNAIRE - PHQ9
SUM OF ALL RESPONSES TO PHQ9 QUESTIONS 1 & 2: 0
1. LITTLE INTEREST OR PLEASURE IN DOING THINGS: NOT AT ALL
2. FEELING DOWN, DEPRESSED OR HOPELESS: NOT AT ALL

## 2025-03-10 NOTE — PROGRESS NOTES
Subjective   Patient ID: Ramesh Julian is a 54 y.o. male who presents for No chief complaint on file..  Follow-up hypertension.    HPI patient is s/p left hip replacement.  Still smokes half pack of cigarettes daily.  Blood pressure on the high side we will start him on losartan.  He should follow DASH diet.  Screening colonoscopy advised LDCT for lung cancer screening advised    Review of Systems   Constitutional: Negative.    HENT: Negative.     Eyes: Negative.    Respiratory: Negative.     Cardiovascular: Negative.    Gastrointestinal: Negative.    Endocrine: Negative.  Negative for cold intolerance.   Genitourinary: Negative.    Musculoskeletal: Negative.    Allergic/Immunologic: Negative.    Neurological: Negative.    Hematological: Negative.    Psychiatric/Behavioral: Negative.     All other systems reviewed and are negative.      Objective   There were no vitals taken for this visit.    Physical Exam  Vitals reviewed.   Constitutional:       Appearance: Normal appearance.   HENT:      Head: Atraumatic.   Eyes:      Conjunctiva/sclera: Conjunctivae normal.   Cardiovascular:      Rate and Rhythm: Normal rate and regular rhythm.      Pulses: Normal pulses.      Heart sounds: Normal heart sounds.   Pulmonary:      Effort: Pulmonary effort is normal.      Breath sounds: Normal breath sounds.   Musculoskeletal:      Right lower leg: No edema.      Left lower leg: No edema.   Lymphadenopathy:      Cervical: No cervical adenopathy.   Neurological:      General: No focal deficit present.      Mental Status: He is alert and oriented to person, place, and time.   Psychiatric:         Mood and Affect: Mood normal.         Behavior: Behavior normal.         Thought Content: Thought content normal.         Assessment/Plan   Problem List Items Addressed This Visit    None  Visit Diagnoses         Codes    Primary hypertension    -  Primary I10    Relevant Medications    losartan (Cozaar) 25 mg tablet    Mixed  hyperlipidemia     E78.2    Relevant Orders    Cholesterol, LDL Direct    Lipid Panel    Comprehensive Metabolic Panel    Fatigue, unspecified type     R53.83    Relevant Orders    CBC and Auto Differential    Prostate cancer screening     Z12.5    Relevant Orders    Prostate Specific Antigen    Screening for colon cancer     Z12.11    Relevant Orders    Referral to Gastroenterology        We advised and discussed smoking cessation blood work ordered for wellness exam for next time referral to GI for screening colonoscopy made also we recommend LDCT for lung cancer screening.

## 2025-03-17 DIAGNOSIS — Z12.5 PROSTATE CANCER SCREENING: ICD-10-CM

## 2025-03-19 ENCOUNTER — APPOINTMENT (OUTPATIENT)
Dept: PHYSICAL THERAPY | Facility: CLINIC | Age: 55
End: 2025-03-19
Payer: COMMERCIAL

## 2025-03-19 DIAGNOSIS — M16.12 PRIMARY OSTEOARTHRITIS OF LEFT HIP: Primary | ICD-10-CM

## 2025-03-19 PROCEDURE — 97110 THERAPEUTIC EXERCISES: CPT | Mod: GP | Performed by: PHYSICAL THERAPIST

## 2025-03-19 ASSESSMENT — PAIN SCALES - GENERAL: PAINLEVEL_OUTOF10: 2

## 2025-03-19 ASSESSMENT — PAIN - FUNCTIONAL ASSESSMENT: PAIN_FUNCTIONAL_ASSESSMENT: 0-10

## 2025-03-19 NOTE — Clinical Note
March 19, 2025    Garret Dillard, PT  [address] [place], [address] [place] [address]    Patient: Ramesh Julian   YOB: 1970   Date of Visit: 3/19/2025       Dear Vaughn Adler MD  [address]   [facility], [address] [place] [address],  [place] [address]    The attached plan of care is being sent to you because your patient’s medical reimbursement requires that you certify the plan of care. Your signature is required to allow uninterrupted insurance coverage.      You may indicate your approval by signing below and faxing this form back to us at Dept Fax: [phone].    Please call Dept: [phone] with any questions or concerns.    Thank you for this referral,        Garret Dillard, PT  [facility] [address] [facility]  [address] [address], located within [facility] [address]    Payer: Payor: [facility] / Plan: [facility] / Product Type: *No Product type* /                                                                         Date:     Dear Garret Dillard, PT,     Re: Mr. Ramesh Julian, MRN:[id number]    I certify that I have reviewed the attached plan of care and it is medically necessary for Mr. Ramesh Julian (1970) who is under my care.          ______________________________________                    _________________  Provider name and credentials                                           Date and time                                                                                           Plan of Care 3/19/25   Effective from: 3/19/2025  Effective to: 6/17/2025    Plan ID: [id number]            Participants as of Finalize on 3/19/2025    Name Type Comments Contact Info    Vaughn Adler MD Surgeon  [phone]    Garret Dillard, PT Physical Therapist  [phone]       Last Plan Note     Author: Garret Dillard PT Status: Signed Last edited: 3/19/2025  3:30 PM       PHYSICAL  THERAPY TREATMENT    Patient name:  Ramesh Julian    MRN:  [id number]    :  1970    Today's Date:  25    Time Calculation  Start Time: 1530  Stop Time: 1611  Time Calculation (min): 41 min     PT Therapeutic Procedures Time Entry  Therapeutic Exercise Time Entry: 38       Referral by:  Referred By: Dr. Vaughn Adler    Diagnoses:  Diagnosis and Precautions: L hip osteoarthritis, s/p L ARIANNA [facility] 2024    Goals:   By the end of 15 visits patient will be able to do the following with < 0-1/10 L HIP pain:    HEP:  Patient will consistently perform HIS home exercise program for 20-30 minute sessions, 1-2x/day, 3-4 days/week independently by the end of 8 visits.  Progressing    Basic ADL's:   Patient will perform bADL's/instrumental ADL's for 30 minutes moving between various closed kinetic chain postures.  Progressing    ROM and Strength:  Patient will demonstrate 5/5 L HIP strength in all planes and WNL's L HIP AROM in all planes to improve their ability to lift, stand, ambulate and perform basic ADL's.  Progressing    Stair Negotiation:  Patient will be able to ambulate up/down stairs for 1-2 flights at a time.   Progressing    Gait/Locomotion:  Patient will be able to ambulate for 30-60 minutes at a time.  Progressing  Minimal to no gait deviation across level ground/stairs.    Sleep:  Patient will sleep thru the night 4/7 nights/week.  Progressing    Participation restrictions:  Increase LEFS to > or = to 55/80 for increased functional ability.  Minimal change    Pain:  Decrease pain at worst to < or = to 0-1/10 for improved QOL and ability to sleep.  Progressing    No point tenderness noted over the L hip.  Progressing    Assessment/Plan:  Patient comes into clinic today for PT Re-Evaluation secondary to being 18 weeks s/p L ARIANNA [facility] 2024.  Patient demonstrates fair overall progression as it relates to his L hip rehab thus far.  Patient still requires skilled PT intervention in order to  improve L hip strength/ROM/gait pattern/functional activity level and reduce overall pain.  Therapeutic exercise performed in order to improve L hip strength/ROM/mobility.  Patient requires increased tactile/verbal cues with exercise in order to reduce L hip stress/strain during the particular exercise performed.  Patient challenged with exercises performed in clinic secondary to L hip fatigue.   PT Assessment  PT Assessment Results: Decreased strength, Decreased range of motion, Decreased endurance, Impaired balance, Decreased mobility, Orthopedic restrictions, Pain  Rehab Prognosis: Good  OP PT Plan  Treatment/Interventions: Cryotherapy, Education/ Instruction, Electrical stimulation, Gait training, Manual therapy, Neuromuscular re-education, Self care/ home management, Therapeutic activities, Therapeutic exercises  PT Plan: Skilled PT  PT Frequency:  (1 visit every 2 weeks)  Duration: 2-3 visits  Certification Period Start Date: 03/19/25  Certification Period End Date: 06/17/25  Number of Treatments Authorized: 3  Rehab Potential: Good  Plan of Care Agreement: Patient    General Visit Information  PT  Visit  PT Received On: 03/19/25    General  Reason for Referral: PT Evaluation  Referred By: Dr. Vaughn Adler  General Comment: L hip osteoarthritis, s/p L ARIANNA [facility] 11/12/2024    Insurance Reviewed  Name of insurance:  [facility]   Visit #:   12  [facility] THR [facility] // 6600/13,500 // 0 COPAY // 20% COINSUR // 100V CY PT/OT COMB // PRIOR AUTH NOT REQ PER [email or web address] // Evonne confirmed 1/26/25 3:45pm     Subjective:  Patient comes into clinic today for PT Re-Evaluation secondary to being 18 weeks s/p L ARIANNA [facility] 11/12/2024.  Patient to follow-up with MD next month.    Precautions  LE Weight Bearing Status: Weight Bearing as Tolerated  Post-Surgical Precautions: Left hip precautions (see eval)    Pain:  Pain Assessment  Pain Assessment: 0-10  0-10 (Numeric) Pain Score:  "2  Pain Location: Hip  Pain Orientation: Left    Objective:    Weightbearing Status:  WBAT L LE    Skin:  L hip surgical ARIANNA incision healed and closed, no active signs of infection.    Palpation:   mild point tenderness over L lateral hip.    Sensation:  Patient denies numbness/tingling of bilateral LOWER extremities.    Gait:  reduced L hip extension late stance phase    ROM:    L hip PROM  Flexion 100 degrees  Abduction 40 degrees  ER 28 degrees    Strength:    L hip flexion 4/5 with pain  Abduction 4/5 with pain  ER 4/5    Outcome measure  Lower Extremity Funtional Score (LEFS): 41/80     Treatments    Therapeutic Exercise  Therapeutic Exercise Performed: Yes  Therapeutic Exercise Activity 1: Recumbent bike x 8 minutes, resistance 7.0  Therapeutic Exercise Activity 2: half kneeling on foam, hip extension mob with black TT x 20 reps  Therapeutic Exercise Activity 3: half kneeling on foam, hip flexion mob with TT black x 20 reps  Therapeutic Exercise Activity 4: half kneeling on foam, R lateral lunge (lateral hip mob) with TT black, x 20 reps  Therapeutic Exercise Activity 5: prone hip ER stretch 30\"x5  Therapeutic Exercise Activity 6: Standing hip hikes x 30 reps  Therapeutic Exercise Activity 7: Re-Evaluation  Therapeutic Exercise Activity 8: Standing hip flexion/extension/abduction x 30 reps R and L    Treatment  Time in clinic started at:   3:30pm  Time in clinic ended at:   4:11pm  Total time in clinic is:      41 minutes  Total timed code time is:  38 minutes    Treatment Performed Today:.   Therapeutic Exercise x 3 units         Current Participants as of 3/19/2025    Name Type Comments Contact Info    Vaughn Adler MD Surgeon  [phone]    Signature pending    Garret Dillard PT Physical Therapist  [phone]    Signature pending      "

## 2025-03-19 NOTE — PROGRESS NOTES
PHYSICAL THERAPY TREATMENT    Patient name:  Ramesh Julian    MRN:  [id number]    :  1970    Today's Date:  25    Time Calculation  Start Time: 1530  Stop Time: 1611  Time Calculation (min): 41 min     PT Therapeutic Procedures Time Entry  Therapeutic Exercise Time Entry: 38       Referral by:  Referred By: Dr. Vaughn Adler    Diagnoses:  Diagnosis and Precautions: L hip osteoarthritis, s/p L ARIANNA [facility] 2024    Goals:   By the end of 15 visits patient will be able to do the following with < 0-1/10 L HIP pain:    HEP:  Patient will consistently perform HIS home exercise program for 20-30 minute sessions, 1-2x/day, 3-4 days/week independently by the end of 8 visits.  Progressing    Basic ADL's:   Patient will perform bADL's/instrumental ADL's for 30 minutes moving between various closed kinetic chain postures.  Progressing    ROM and Strength:  Patient will demonstrate 5/5 L HIP strength in all planes and WNL's L HIP AROM in all planes to improve their ability to lift, stand, ambulate and perform basic ADL's.  Progressing    Stair Negotiation:  Patient will be able to ambulate up/down stairs for 1-2 flights at a time.   Progressing    Gait/Locomotion:  Patient will be able to ambulate for 30-60 minutes at a time.  Progressing  Minimal to no gait deviation across level ground/stairs.    Sleep:  Patient will sleep thru the night 4/7 nights/week.  Progressing    Participation restrictions:  Increase LEFS to > or = to 55/80 for increased functional ability.  Minimal change    Pain:  Decrease pain at worst to < or = to 0-1/10 for improved QOL and ability to sleep.  Progressing    No point tenderness noted over the L hip.  Progressing    Assessment/Plan:  Patient comes into clinic today for PT Re-Evaluation secondary to being 18 weeks s/p L ARIANNA [facility] 2024.  Patient demonstrates fair overall progression as it relates to his L hip rehab thus far.  Patient still requires skilled PT intervention in  order to improve L hip strength/ROM/gait pattern/functional activity level and reduce overall pain.  Therapeutic exercise performed in order to improve L hip strength/ROM/mobility.  Patient requires increased tactile/verbal cues with exercise in order to reduce L hip stress/strain during the particular exercise performed.  Patient challenged with exercises performed in clinic secondary to L hip fatigue.   PT Assessment  PT Assessment Results: Decreased strength, Decreased range of motion, Decreased endurance, Impaired balance, Decreased mobility, Orthopedic restrictions, Pain  Rehab Prognosis: Good  OP PT Plan  Treatment/Interventions: Cryotherapy, Education/ Instruction, Electrical stimulation, Gait training, Manual therapy, Neuromuscular re-education, Self care/ home management, Therapeutic activities, Therapeutic exercises  PT Plan: Skilled PT  PT Frequency:  (1 visit every 2 weeks)  Duration: 2-3 visits  Certification Period Start Date: 03/19/25  Certification Period End Date: 06/17/25  Number of Treatments Authorized: 3  Rehab Potential: Good  Plan of Care Agreement: Patient    General Visit Information  PT  Visit  PT Received On: 03/19/25    General  Reason for Referral: PT Evaluation  Referred By: Dr. Vaughn Adler  General Comment: L hip osteoarthritis, s/p L ARIANNA [facility] 11/12/2024    Insurance Reviewed  Name of insurance:  [facility]   Visit #:   12  [facility] // 6600/13,500 // 0 COPAY // 20% [employer]INSUR // 100V CY PT/OT COMB // PRIOR AUTH NOT REQ PER [email or web address] // Evonne confirmed 1/26/25 3:45pm     Subjective:  Patient comes into clinic today for PT Re-Evaluation secondary to being 18 weeks s/p L ARIANNA [facility] 11/12/2024.  Patient to follow-up with MD next month.    Precautions  LE Weight Bearing Status: Weight Bearing as Tolerated  Post-Surgical Precautions: Left hip precautions (see eval)    Pain:  Pain Assessment  Pain Assessment: 0-10  0-10 (Numeric)  "Pain Score: 2  Pain Location: Hip  Pain Orientation: Left    Objective:    Weightbearing Status:  WBAT L LE    Skin:  L hip surgical ARIANNA incision healed and closed, no active signs of infection.    Palpation:   mild point tenderness over L lateral hip.    Sensation:  Patient denies numbness/tingling of bilateral LOWER extremities.    Gait:  reduced L hip extension late stance phase    ROM:    L hip PROM  Flexion 100 degrees  Abduction 40 degrees  ER 28 degrees    Strength:    L hip flexion 4/5 with pain  Abduction 4/5 with pain  ER 4/5    Outcome measure  Lower Extremity Funtional Score (LEFS): 41/80     Treatments    Therapeutic Exercise  Therapeutic Exercise Performed: Yes  Therapeutic Exercise Activity 1: Recumbent bike x 8 minutes, resistance 7.0  Therapeutic Exercise Activity 2: half kneeling on foam, hip extension mob with black TT x 20 reps  Therapeutic Exercise Activity 3: half kneeling on foam, hip flexion mob with TT black x 20 reps  Therapeutic Exercise Activity 4: half kneeling on foam, R lateral lunge (lateral hip mob) with TT black, x 20 reps  Therapeutic Exercise Activity 5: prone hip ER stretch 30\"x5  Therapeutic Exercise Activity 6: Standing hip hikes x 30 reps  Therapeutic Exercise Activity 7: Re-Evaluation  Therapeutic Exercise Activity 8: Standing hip flexion/extension/abduction x 30 reps R and L    Treatment  Time in clinic started at:   3:30pm  Time in clinic ended at:   4:11pm  Total time in clinic is:      41 minutes  Total timed code time is:  38 minutes    Treatment Performed Today:.   Therapeutic Exercise x 3 units  "

## 2025-03-24 DIAGNOSIS — R53.83 FATIGUE, UNSPECIFIED TYPE: ICD-10-CM

## 2025-03-24 DIAGNOSIS — E78.2 MIXED HYPERLIPIDEMIA: ICD-10-CM

## 2025-04-02 ENCOUNTER — APPOINTMENT (OUTPATIENT)
Dept: PHYSICAL THERAPY | Facility: CLINIC | Age: 55
End: 2025-04-02
Payer: COMMERCIAL

## 2025-04-02 DIAGNOSIS — M16.12 PRIMARY OSTEOARTHRITIS OF LEFT HIP: Primary | ICD-10-CM

## 2025-04-02 PROCEDURE — 97110 THERAPEUTIC EXERCISES: CPT | Mod: GP,CQ

## 2025-04-02 ASSESSMENT — PAIN SCALES - GENERAL: PAINLEVEL_OUTOF10: 2

## 2025-04-02 ASSESSMENT — PAIN - FUNCTIONAL ASSESSMENT: PAIN_FUNCTIONAL_ASSESSMENT: 0-10

## 2025-04-02 NOTE — PROGRESS NOTES
PHYSICAL THERAPY TREATMENT    Patient name:  Ramesh Julian    MRN:  [id number]    :  1970    Today's Date:  25    Time Calculation  Start Time: 325  Stop Time: 405  Time Calculation (min): 40 min     PT Therapeutic Procedures Time Entry  Therapeutic Exercise Time Entry: 38       Referral by:  Referred By: Dr. Vaughn Adler    Diagnoses:  Diagnosis and Precautions: L hip osteoarthritis, s/p L ARIANNA [facility] 2024      Assessment/Plan:  Therapeutic exercise performed in order to improve L hip strength/ROM/mobility.  Patient requires increased tactile/verbal cues with exercise in order to reduce L hip stress/strain and compensation during the particular exercise performed.  Patient challenged with exercises performed in clinic secondary to L hip fatigue. Patient would benefit from P.T. to continue to address impairments in order to improve strength, flexibility, posture, and  to decrease symptoms and increase overall function.    PT Assessment  Evaluation/Treatment Tolerance: Patient tolerated treatment well  OP PT Plan  PT Plan: Skilled PT    General Visit Information       General  Reason for Referral: PT Evaluation  Referred By: Dr. Vaughn Adler  General Comment: L hip osteoarthritis, s/p L ARIANNA [facility] 2024    Insurance Reviewed  Name of insurance:  [facility]   Visit #:   13  [facility]13[facility]ED // 6600/13,500 // 0 COPAY // 20% COINSUR // 100V CY PT/OT COMB // PRIOR AUTH NOT REQ PER SECURE.[employer] // Evonne confirmed 25 3:45pm     Subjective:  Patient comes into clinic today  Patient to follow-up with MD 25.    Precautions  LE Weight Bearing Status: Weight Bearing as Tolerated  Post-Surgical Precautions: Left hip precautions (see eval)    Pain:  Pain Assessment  Pain Assessment: 0-10  0-10 (Numeric) Pain Score: 2  Pain Location: Hip  Pain Orientation: Left    Objective:  Amb in to clinic w/SPC w/decreased L hip ext; vcs to  "correct.    Treatments  Recumbent bike (SH 15) x 6 min, resistance 8  Standing hip flexor stretch at step 30 sec x 3  Standing hip flexion/extension/abduction x 15 B NT  Standing hip hikes 2 x 15 B  Supine bridges w/hip ABD 2x 15 NT  Standing hip abduction x15 B  Supine bent knee fallout x 20   LAQ 4# 2x15 NT  Hamstring curls BLKTB x 20 NT  Dynamic march, hip abd, hsc in hallway (<90) 20 ft x 2  DLS March x15ea  DLS w/KO x10ea NT  half kneeling on foam, hip extension mob with black TT x 20 reps  half kneeling on foam, hip flexion mob with TT black x 20 reps  half kneeling on foam, R lateral lunge (lateral hip mob) with TT black, x 20 reps  Gait tx   Runners Hip Flex stretch 3x30\"  Lunge Step to 2x10 L stance  Prone hip ext (knee flex and ext) 2x10ea   Prone Hip ABD 2x10      Goals:   By the end of 15 visits patient will be able to do the following with < 0-1/10 L HIP pain:    HEP:  Patient will consistently perform HIS home exercise program for 20-30 minute sessions, 1-2x/day, 3-4 days/week independently by the end of 8 visits.  Progressing    Basic ADL's:   Patient will perform bADL's/instrumental ADL's for 30 minutes moving between various closed kinetic chain postures.  Progressing    ROM and Strength:  Patient will demonstrate 5/5 L HIP strength in all planes and WNL's L HIP AROM in all planes to improve their ability to lift, stand, ambulate and perform basic ADL's.  Progressing    Stair Negotiation:  Patient will be able to ambulate up/down stairs for 1-2 flights at a time.   Progressing    Gait/Locomotion:  Patient will be able to ambulate for 30-60 minutes at a time.  Progressing  Minimal to no gait deviation across level ground/stairs.    Sleep:  Patient will sleep thru the night 4/7 nights/week.  Progressing    Participation restrictions:  Increase LEFS to > or = to 55/80 for increased functional ability.  Minimal change    Pain:  Decrease pain at worst to < or = to 0-1/10 for improved QOL and ability to " sleep.  Progressing    No point tenderness noted over the L hip.  Progressing

## 2025-04-15 ENCOUNTER — APPOINTMENT (OUTPATIENT)
Dept: PHYSICAL THERAPY | Facility: CLINIC | Age: 55
End: 2025-04-15
Payer: COMMERCIAL

## 2025-04-15 DIAGNOSIS — M16.12 PRIMARY OSTEOARTHRITIS OF LEFT HIP: Primary | ICD-10-CM

## 2025-04-15 PROCEDURE — 97110 THERAPEUTIC EXERCISES: CPT | Mod: GP

## 2025-04-15 PROCEDURE — 97116 GAIT TRAINING THERAPY: CPT | Mod: GP

## 2025-04-15 NOTE — PROGRESS NOTES
PHYSICAL THERAPY TREATMENT    Patient name:  Ramesh Julian    MRN:  [id number]    :  1970    Today's Date:  04/15/25    Time Calculation  Start Time: 1647  Stop Time: 1728  Time Calculation (min): 41 min     PT Therapeutic Procedures Time Entry  Therapeutic Exercise Time Entry: 30  Gait Training Time Entry: 11       Referral by:       Diagnoses:         Assessment/Plan:    Pt had minor occ of N/T in L foot after strengthening exercises, but recovers as expected with rest and standing rest breaks. Pt demos good balance and endurance with lateral stepping this session. Focus of session after strengthening was ambulation bouts with SPC in fwd/back and start/stop challenges. No LOB and had good gait patterns.             General Visit Information  PT  Visit  PT Received On: 04/15/25  Response to Previous Treatment: Patient with no complaints from previous session.         Insurance Reviewed  Name of insurance:  [facility]   Visit #:   14  [facility]13[facility] DED // 6600/13,500 // 0 COPAY // 20% COINSUR // 100V CY PT/OT COMB // PRIOR AUTH NOT REQ PER SECURE.[employer] // Evonne confirmed 25 3:45pm     Subjective:  Patient comes into clinic today reporting minor pain at hip, using SPC.     Precautions  LE Weight Bearing Status: Weight Bearing as Tolerated  Post-Surgical Precautions: Left hip precautions (see eval)    Pain:       Objective:  Cues for improved balance during amb bouts using SPC, as pt tends to place cane on floor late.     Treatments  Recumbent bike (SH 15) x 6 min, resistance 8  Standing hip flexor stretch at step 30 sec x 3  Standing hip flexion/extension/abduction x 15 B NT  Standing hip hikes 2 x 15 B  Supine bridges w/hip ABD 2x 15 NT  Standing hip abduction x15 B  Supine bent knee fallout x 20   LAQ 4# 2x15 NT  Hamstring curls BLKTB x 20 NT  Dynamic march, hip abd, hsc in hallway (<90) 20 ft x 2  DLS March x15ea  DLS w/KO x10ea NT  half kneeling on  "foam, hip extension mob with black TT x 20 reps  half kneeling on foam, hip flexion mob with TT black x 20 reps  half kneeling on foam, R lateral lunge (lateral hip mob) with TT black, x 20 reps  Gait tx   Runners Hip Flex stretch 3x30\"  Lunge Step to 2x10 L stance  Prone hip ext (knee flex and ext) 2x10ea   Prone Hip ABD 2x10  4/15/58  Supine AAROM>AROM IR/ER to L hip: 2x30 sec  Bridges with manual resistance into valgus to challenge lateral mm: 2x12  R side lying hip abd: 1x10        Progressed to additional hip flex: 2x10  Gait training with SPC: x12 min  Prone hip ext AROM: 2x12  Staggered stance STS, R LE lead: 2x10   Recumbent bike (SH 14.5) x 6 min, resistance progression 6-10     Goals:   By the end of 15 visits patient will be able to do the following with < 0-1/10 L HIP pain:    HEP:  Patient will consistently perform HIS home exercise program for 20-30 minute sessions, 1-2x/day, 3-4 days/week independently by the end of 8 visits.  Progressing    Basic ADL's:   Patient will perform bADL's/instrumental ADL's for 30 minutes moving between various closed kinetic chain postures.  Progressing    ROM and Strength:  Patient will demonstrate 5/5 L HIP strength in all planes and WNL's L HIP AROM in all planes to improve their ability to lift, stand, ambulate and perform basic ADL's.  Progressing    Stair Negotiation:  Patient will be able to ambulate up/down stairs for 1-2 flights at a time.   Progressing    Gait/Locomotion:  Patient will be able to ambulate for 30-60 minutes at a time.  Progressing  Minimal to no gait deviation across level ground/stairs.    Sleep:  Patient will sleep thru the night 4/7 nights/week.  Progressing    Participation restrictions:  Increase LEFS to > or = to 55/80 for increased functional ability.  Minimal change    Pain:  Decrease pain at worst to < or = to 0-1/10 for improved QOL and ability to sleep.  Progressing    No point tenderness noted over the L hip.  Progressing    Gary " Ian  4/15/25

## 2025-04-16 ENCOUNTER — OFFICE VISIT (OUTPATIENT)
Dept: ORTHOPEDIC SURGERY | Facility: CLINIC | Age: 55
End: 2025-04-16
Payer: COMMERCIAL

## 2025-04-16 DIAGNOSIS — M70.62 TROCHANTERIC BURSITIS, LEFT HIP: Primary | ICD-10-CM

## 2025-04-16 DIAGNOSIS — Z96.642 STATUS POST LEFT HIP REPLACEMENT: ICD-10-CM

## 2025-04-16 DIAGNOSIS — M70.72 BURSITIS OF LEFT HIP, UNSPECIFIED BURSA: ICD-10-CM

## 2025-04-16 PROCEDURE — 99213 OFFICE O/P EST LOW 20 MIN: CPT | Performed by: STUDENT IN AN ORGANIZED HEALTH CARE EDUCATION/TRAINING PROGRAM

## 2025-04-16 RX ORDER — METHYLPREDNISOLONE 4 MG/1
TABLET ORAL
Qty: 1 EACH | Refills: 0 | Status: SHIPPED | OUTPATIENT
Start: 2025-04-16

## 2025-04-16 NOTE — PROGRESS NOTES
Chief Complaint   Patient presents with    Left Hip - Follow-up     Total hip arthoplasty TRISTAN 11/12/2024       History of Present Illness  Ramesh is a 54-year-old male presents today for follow-up of his left hip.  Last visit we did give him oral Medrol Dosepak for what sounds to be like hip bursitis this is significantly improved has had significant relief with this continues to have some laterally based hip pain utilizes a cane occasionally however independently at home.    Exam  Mild swelling thigh  Well-healed incision, no erythema or drainage  Tolerates PROM and gentle log roll of hip with moderate pain  + Plantar/dorsiflexion  Negative Stanislav test  Exquisite tenderness outpatient lateral hip     X-Ray   Narrative & Impression   Interpreted By:  Jake Griggs III,   STUDY:  XR HIP LEFT WITH PELVIS WHEN PERFORMED 2 OR 3 VIEWS; ;  2/17/2025  3:45 pm      INDICATION:  Signs/Symptoms:pain.      ,Z96.642 Presence of left artificial hip joint      COMPARISON:  None.      ACCESSION NUMBER(S):  [id number]      ORDERING CLINICIAN:  JAKE GRIGGS      FINDINGS:  Two views left hip: Status post left total hip arthroplasty  appropriately placed no paco-implant fracture or lucency      IMPRESSION:  Status post left total hip arthroplasty          MACRO:  None      Signed by: Jake Griggs III 2/17/2025 3:51 PM  Dictation workstation:   [id number]      Narrative & Impression   Interpreted By:  Jake Griggs III,   STUDY:  XR HIP LEFT WITH PELVIS WHEN PERFORMED 2 OR 3 VIEWS; ;  11/25/2024  1:57 pm      INDICATION:  Signs/Symptoms:pain.      ,M16.12 Unilateral primary osteoarthritis, left hip      COMPARISON:  None.      ACCESSION NUMBER(S):  [id number]      ORDERING CLINICIAN:  JAKE GRIGGS      FINDINGS:  AP pelvis lateral left hip: Status post left total hip arthroplasty  appropriately placed no paco-implant fracture or lucency. Well placed  right total hip arthroplasty      IMPRESSION:  Status post left total  hip arthroplasty          MACRO:  None      Signed by: Vaughn Adler III 11/25/2024 2:12 PM  Dictation workstation:   [id number]        Assessment  Patient status post left total hip arthroplasty 5 months out with resolving left hip bursiti    Plan  Will provide a prescription for oral Medrol Dosepak.  He will follow-up in 3 months time for assessment of progress we will provide a handicap placard today.  If fails to have significant relief may benefit from a hip bursa injection  Discussed activities to avoid as well as importance of using pain as a guide  X-rays at follow-up 2 views left hip  Discussed ultimate goal over the next 3 months is to discontinue use of cane  Overall making progress    Vaughn Adler III, MD

## 2025-04-29 ENCOUNTER — APPOINTMENT (OUTPATIENT)
Dept: PHYSICAL THERAPY | Facility: CLINIC | Age: 55
End: 2025-04-29
Payer: COMMERCIAL

## 2025-04-29 DIAGNOSIS — M16.12 PRIMARY OSTEOARTHRITIS OF LEFT HIP: Primary | ICD-10-CM

## 2025-04-29 PROCEDURE — 97110 THERAPEUTIC EXERCISES: CPT | Mod: GP | Performed by: PHYSICAL THERAPIST

## 2025-04-29 ASSESSMENT — PAIN SCALES - GENERAL: PAINLEVEL_OUTOF10: 1

## 2025-04-29 ASSESSMENT — PAIN - FUNCTIONAL ASSESSMENT: PAIN_FUNCTIONAL_ASSESSMENT: 0-10

## 2025-04-29 NOTE — PROGRESS NOTES
PHYSICAL THERAPY TREATMENT    Patient name:  Ramesh Julian    MRN:  [id number]    :  1970    Today's Date:  25    Time Calculation  Start Time: 1542  Stop Time: 1615  Time Calculation (min): 33 min     PT Therapeutic Procedures Time Entry  Therapeutic Exercise Time Entry: 33       Referral by:  Referred By: Dr. Vaughn Adler    Diagnoses:  Diagnosis and Precautions: L hip osteoarthritis, s/p L ARIANNA [facility] 2024    Goals: All goals met  By the end of 15 visits patient will be able to do the following with < 0-1/10 L HIP pain:    HEP:  Patient will consistently perform HIS home exercise program for 20-30 minute sessions, 1-2x/day, 3-4 days/week independently by the end of 8 visits.       Basic ADL's:   Patient will perform bADL's/instrumental ADL's for 30 minutes moving between various closed kinetic chain postures.       ROM and Strength:  Patient will demonstrate 5/5 L HIP strength in all planes and WNL's L HIP AROM in all planes to improve their ability to lift, stand, ambulate and perform basic ADL's.       Stair Negotiation:  Patient will be able to ambulate up/down stairs for 1-2 flights at a time.        Gait/Locomotion:  Patient will be able to ambulate for 30-60 minutes at a time.     Minimal to no gait deviation across level ground/stairs.    Sleep:  Patient will sleep thru the night 4/7 nights/week.       Participation restrictions:  Increase LEFS to > or = to 55/80 for increased functional ability.       Pain:  Decrease pain at worst to < or = to 0-1/10 for improved QOL and ability to sleep.       No point tenderness noted over the L hip.       Assessment/Plan:  Patient comes into clinic today for PT Re-Evaluation secondary to being 24 weeks s/p L ARIANNA [facility] 2024.  Patient demonstrates good overall progression as it relates to his L hip rehab thus far.  Patient has met all of his PT goals, is independent with his HEP and will be discharged.  PT Assessment  PT Assessment Results:  Decreased strength, Decreased range of motion, Decreased endurance, Impaired balance, Decreased mobility, Orthopedic restrictions, Pain  Rehab Prognosis: Good  OP PT Plan  PT Plan: No Additional PT interventions required at this time  PT Frequency:  (DC)  Duration: DC  Rehab Potential: Good  Plan of Care Agreement: Patient    General Visit Information  PT  Visit  PT Received On: 04/29/25    General  Reason for Referral: PT Evaluation  Referred By: Dr. Vaughn Adler  General Comment: L hip osteoarthritis, s/p L ARIANNA [facility] 11/12/2024    Insurance Reviewed  Name of insurance:  [facility]   Visit #:   15  [facility] DED // 6600/13,500 // 0 COPAY // 20% COINSUR // 100V CY PT/OT COMB // PRIOR AUTH NOT REQ PER SECURE.[employer] // Evonne confirmed 1/26/25 3:45pm     Subjective:  Patient comes into clinic today for PT Re-Evaluation secondary to being 24 weeks s/p L ARIANNA [facility] 11/12/2024.  Patient to follow-up with MD in July 2024.  Patient reports that overall his L hip feels 90% improved with regards to performing basic ADL's.    Precautions  LE Weight Bearing Status: Weight Bearing as Tolerated  Post-Surgical Precautions: Left hip precautions (see eval)    Pain:  Pain Assessment  Pain Assessment: 0-10  0-10 (Numeric) Pain Score: 1  Pain Type: Chronic pain  Pain Location: Hip  Pain Orientation: Left    Objective:    Weightbearing Status:  WBAT L LE    Skin:  L hip surgical ARIANNA incision healed and closed, no active signs of infection.    Palpation:   no point tenderness over L lateral hip.    Sensation:  Patient denies numbness/tingling of bilateral LOWER extremities.    Gait:  Normal gait     ROM:    L hip PROM  Flexion 110 degrees  Abduction 45 degrees  ER 34 degrees    Strength:    L hip flexion 5/5  Abduction 5/5 with pain  ER 4+/5    Outcome measure  Lower Extremity Funtional Score (LEFS): 62/80     Treatments    Therapeutic Exercise  Therapeutic Exercise Performed:  Yes  Therapeutic Exercise Activity 1: Recumbent bike x 8 minutes, resistance 7.0  Therapeutic Exercise Activity 2: Re-Evaluation    Treatment  Time in clinic started at:   3:42pm  Time in clinic ended at:   4:15pm  Total time in clinic is:      33 minutes  Total timed code time is:  33 minutes    Treatment Performed Today:.   Therapeutic Exercise x 2 units

## 2025-05-15 ENCOUNTER — TELEPHONE (OUTPATIENT)
Dept: ORTHOPEDIC SURGERY | Facility: CLINIC | Age: 55
End: 2025-05-15
Payer: COMMERCIAL

## 2025-05-19 DIAGNOSIS — M25.552 LEFT HIP PAIN: Primary | ICD-10-CM

## 2025-05-19 RX ORDER — NAPROXEN 500 MG/1
500 TABLET ORAL 2 TIMES DAILY
Qty: 60 TABLET | Refills: 0 | Status: SHIPPED | OUTPATIENT
Start: 2025-05-19 | End: 2025-06-18

## 2025-06-11 ENCOUNTER — APPOINTMENT (OUTPATIENT)
Dept: PRIMARY CARE | Facility: CLINIC | Age: 55
End: 2025-06-11
Payer: COMMERCIAL

## 2025-06-12 DIAGNOSIS — M25.552 LEFT HIP PAIN: ICD-10-CM

## 2025-06-12 RX ORDER — NAPROXEN 500 MG/1
500 TABLET ORAL 2 TIMES DAILY
Qty: 60 TABLET | Refills: 0 | Status: SHIPPED | OUTPATIENT
Start: 2025-06-12

## 2025-06-17 ENCOUNTER — APPOINTMENT (OUTPATIENT)
Dept: PRIMARY CARE | Facility: CLINIC | Age: 55
End: 2025-06-17
Payer: COMMERCIAL

## 2025-07-07 DIAGNOSIS — M25.552 LEFT HIP PAIN: ICD-10-CM

## 2025-07-07 RX ORDER — NAPROXEN 500 MG/1
500 TABLET ORAL 2 TIMES DAILY
Qty: 60 TABLET | Refills: 0 | Status: SHIPPED | OUTPATIENT
Start: 2025-07-07

## 2025-07-15 ENCOUNTER — APPOINTMENT (OUTPATIENT)
Dept: PRIMARY CARE | Facility: CLINIC | Age: 55
End: 2025-07-15
Payer: COMMERCIAL

## 2025-07-15 VITALS
HEART RATE: 77 BPM | DIASTOLIC BLOOD PRESSURE: 76 MMHG | WEIGHT: 181 LBS | SYSTOLIC BLOOD PRESSURE: 122 MMHG | HEIGHT: 65 IN | BODY MASS INDEX: 30.16 KG/M2 | TEMPERATURE: 97.8 F | OXYGEN SATURATION: 97 %

## 2025-07-15 DIAGNOSIS — I10 HTN (HYPERTENSION), BENIGN: ICD-10-CM

## 2025-07-15 DIAGNOSIS — F17.200 SMOKER: ICD-10-CM

## 2025-07-15 DIAGNOSIS — L02.11 NECK ABSCESS: Primary | ICD-10-CM

## 2025-07-15 DIAGNOSIS — Z12.11 ENCOUNTER FOR SCREENING COLONOSCOPY: ICD-10-CM

## 2025-07-15 DIAGNOSIS — L25.9 CONTACT DERMATITIS, UNSPECIFIED CONTACT DERMATITIS TYPE, UNSPECIFIED TRIGGER: ICD-10-CM

## 2025-07-15 PROCEDURE — 3008F BODY MASS INDEX DOCD: CPT | Performed by: INTERNAL MEDICINE

## 2025-07-15 PROCEDURE — 99213 OFFICE O/P EST LOW 20 MIN: CPT | Performed by: INTERNAL MEDICINE

## 2025-07-15 PROCEDURE — 3074F SYST BP LT 130 MM HG: CPT | Performed by: INTERNAL MEDICINE

## 2025-07-15 PROCEDURE — 3078F DIAST BP <80 MM HG: CPT | Performed by: INTERNAL MEDICINE

## 2025-07-15 RX ORDER — TRIAMCINOLONE ACETONIDE 1 MG/G
CREAM TOPICAL 2 TIMES DAILY
Qty: 30 G | Refills: 0 | Status: SHIPPED | OUTPATIENT
Start: 2025-07-15

## 2025-07-15 RX ORDER — AMOXICILLIN AND CLAVULANATE POTASSIUM 875; 125 MG/1; MG/1
875 TABLET, FILM COATED ORAL 2 TIMES DAILY
Qty: 20 TABLET | Refills: 0 | Status: SHIPPED | OUTPATIENT
Start: 2025-07-15 | End: 2025-07-25

## 2025-07-15 ASSESSMENT — PATIENT HEALTH QUESTIONNAIRE - PHQ9
1. LITTLE INTEREST OR PLEASURE IN DOING THINGS: NOT AT ALL
SUM OF ALL RESPONSES TO PHQ9 QUESTIONS 1 & 2: 0
2. FEELING DOWN, DEPRESSED OR HOPELESS: NOT AT ALL

## 2025-07-16 ENCOUNTER — HOSPITAL ENCOUNTER (OUTPATIENT)
Dept: RADIOLOGY | Facility: CLINIC | Age: 55
Discharge: HOME | End: 2025-07-16
Payer: COMMERCIAL

## 2025-07-16 ENCOUNTER — OFFICE VISIT (OUTPATIENT)
Dept: OTOLARYNGOLOGY | Facility: CLINIC | Age: 55
End: 2025-07-16
Payer: COMMERCIAL

## 2025-07-16 ENCOUNTER — OFFICE VISIT (OUTPATIENT)
Dept: ORTHOPEDIC SURGERY | Facility: CLINIC | Age: 55
End: 2025-07-16
Payer: COMMERCIAL

## 2025-07-16 DIAGNOSIS — M70.62 TROCHANTERIC BURSITIS, LEFT HIP: ICD-10-CM

## 2025-07-16 DIAGNOSIS — M70.72 BURSITIS OF LEFT HIP, UNSPECIFIED BURSA: ICD-10-CM

## 2025-07-16 DIAGNOSIS — M16.0 PRIMARY OSTEOARTHRITIS OF BOTH HIPS: ICD-10-CM

## 2025-07-16 DIAGNOSIS — L02.11 NECK ABSCESS: Primary | ICD-10-CM

## 2025-07-16 DIAGNOSIS — Z96.642 STATUS POST LEFT HIP REPLACEMENT: ICD-10-CM

## 2025-07-16 PROCEDURE — 99213 OFFICE O/P EST LOW 20 MIN: CPT | Performed by: STUDENT IN AN ORGANIZED HEALTH CARE EDUCATION/TRAINING PROGRAM

## 2025-07-16 PROCEDURE — 73502 X-RAY EXAM HIP UNI 2-3 VIEWS: CPT | Mod: LEFT SIDE | Performed by: STUDENT IN AN ORGANIZED HEALTH CARE EDUCATION/TRAINING PROGRAM

## 2025-07-16 PROCEDURE — 10061 I&D ABSCESS COMP/MULTIPLE: CPT | Performed by: OTOLARYNGOLOGY

## 2025-07-16 PROCEDURE — 99212 OFFICE O/P EST SF 10 MIN: CPT | Performed by: STUDENT IN AN ORGANIZED HEALTH CARE EDUCATION/TRAINING PROGRAM

## 2025-07-16 PROCEDURE — 73502 X-RAY EXAM HIP UNI 2-3 VIEWS: CPT | Mod: LT

## 2025-07-16 RX ORDER — CEPHALEXIN 500 MG/1
500 CAPSULE ORAL 3 TIMES DAILY
Qty: 30 CAPSULE | Refills: 0 | Status: SHIPPED | OUTPATIENT
Start: 2025-07-16 | End: 2025-07-26

## 2025-07-16 ASSESSMENT — ENCOUNTER SYMPTOMS
EYES NEGATIVE: 1
CONSTITUTIONAL NEGATIVE: 1
RESPIRATORY NEGATIVE: 1
NEUROLOGICAL NEGATIVE: 1
GASTROINTESTINAL NEGATIVE: 1
CARDIOVASCULAR NEGATIVE: 1

## 2025-07-16 NOTE — PROGRESS NOTES
"Subjective   Patient ID: Ramesh Julian is a 54 y.o. male who presents for Follow-up (Pt here for office visit   was out in the sun takes losartan. Lump on left side of neck approx a month last couple days have gotten larger, hurts put a warm compress yesterday ).    HPI patient here for acute visit he has a lump in the left side of neck and seems like it could be on early abscess and he will need I&D and antibiotics.  Still smokes has not had colonoscopy done.    Review of Systems   Constitutional: Negative.    HENT:  Negative for ear pain.         Swelling of the left side of neck below the jaw   Eyes: Negative.    Respiratory: Negative.     Cardiovascular: Negative.    Gastrointestinal: Negative.    Genitourinary: Negative.    Musculoskeletal:         Hip pain bursitis bilateral.  History of hip replacement.   Neurological: Negative.    All other systems reviewed and are negative.      Objective   /76   Pulse 77   Temp 36.6 °C (97.8 °F)   Ht 1.651 m (5' 5\")   Wt 82.1 kg (181 lb)   SpO2 97%   BMI 30.12 kg/m²     Physical Exam  Vitals reviewed.   Constitutional:       Appearance: Normal appearance.   HENT:      Head: Atraumatic.   Neck:      Comments: 1 inch swelling with eschar at left side below the jawline.  Cardiovascular:      Rate and Rhythm: Normal rate and regular rhythm.      Pulses: Normal pulses.      Heart sounds: Normal heart sounds.   Pulmonary:      Effort: Pulmonary effort is normal.      Breath sounds: Normal breath sounds.   Musculoskeletal:      Cervical back: No rigidity.      Right lower leg: No edema.      Left lower leg: No edema.   Neurological:      General: No focal deficit present.      Mental Status: He is alert.       Assessment/Plan   Problem List Items Addressed This Visit           ICD-10-CM    Smoker F17.200     Other Visit Diagnoses         Codes      Neck abscess    -  Primary L02.11    Relevant Medications    amoxicillin-clavulanate (Augmentin) 875-125 mg tablet    " Other Relevant Orders    Referral to ENT      Contact dermatitis, unspecified contact dermatitis type, unspecified trigger     L25.9    Relevant Medications    triamcinolone (Kenalog) 0.1 % cream      Encounter for screening colonoscopy     Z12.11    Relevant Orders    Referral to Gastroenterology      HTN (hypertension), benign     I10        Neck abscess early antibiotic prescribed ENT consultation ordered.  Patient advised about smoking cessation.  Continue losartan for hypertension.  Patient has a rash consistent with contact dermatitis above right wrist for which topical steroids have been ordered.

## 2025-07-16 NOTE — PROGRESS NOTES
Chief Complaint   Patient presents with    Left Hip - Follow-up     Total hip arthoplasty TRISTAN 11/12/2024, xrays today       History of Present Illness  Patient returns today endorsing minimal if any pain.  Denies any numbness or tingling.  No calf pain, No chest pain or shortness of breath.     Patient is 8 months status post total hip arthroplasty well-known to me for history of hip bursitis that does improve with oral Medrol Dosepak as well as anti-inflammatory.  Overall doing well occasionally ambulates with a cane particularly for longer walks.  Does get significant relief with naproxen.     Exam  Ambulating independently.  Tenderness palpation lateral hip mild  Well healed incision, no erythema or drainage  Tolerates ROM and gentle log roll of hip without issues  + Plantar/dorsiflexion  Negative Stanislav test     X-Ray    Imaging  XR hip left with pelvis when performed 2 or 3 views  Result Date: 7/16/2025  Status post left total hip arthroplasty     MACRO: None   Signed by: Vaughn Adler III 7/16/2025 3:29 PM Dictation workstation:   [id number]      Cardiology, Vascular, and Other Imaging  No other imaging results found for the past 7 days       Assessment  Patient status post total hip arthroplasty doing well, left hip bursitis     Plan  Discussed analgesics, encouraging non-opioid modalities.  Encouraged ice, rest.  Follow-up at 1 year anniversary   XR at follow up 2 views operative hip  Discussed dental prophylaxis    Overall doing okay currently with anti-inflammatories overall happy that he did proceed with hip arthroplasty discussed full recovery does typically take 1 year he will follow-up his 1 year anniversary.  He does have worsening pain any issues he will return to clinic in the interim.  Did discuss long-term use of naproxen that he would benefit from monitoring of kidney function.

## 2025-07-16 NOTE — PROGRESS NOTES
Subjective   Patient ID: Ramesh Julian is a 54 y.o. male who presents for NECK ABSCESS.    HPI  Patient presents today for evidence of a suspected sebaceous cyst which has become significant infected with abscess formation.  There is tenting of the skin is quite painful.  He is here for appropriate incision and drainage.  Was started on amoxicillin yesterday and referred to us appropriate.  Remaining ENT inquiries clear.    Review of Systems   All other systems reviewed and are negative.        Physical Exam  General appearance: No acute distress. Normal facies. Symmetric facial movement. No gross lesions of the face are noted.  The external ear structures appear normal. The ear canals patent and the tympanic membranes are intact without evidence of air-fluid levels, retraction, or congenital defects.  Anterior rhinoscopy notes essentially a midline nasal septum. Examination is noted for normal healthy mucosal membranes without any evidence of lesions, polyps, or exudate. The tongue is normally mobile. There are no lesions on the gingiva, buccal, or oral mucosa. There are no oral cavity masses.  The neck is negative for mass lymphadenopathy but he clearly shows evidence of an abscessed sebaceous cyst left neck. The trachea and parotid are clear. The thyroid bed is grossly unremarkable. The salivary gland structures are grossly unremarkable.    Procedure:  After verbal informed consent patient underwent appropriate prep drape procedure for incision and drainage of the abscess.  The base was overtreated with lidocaine 1%-epinephrine 1 20,000.  A small stab incision was created with a #11 blade and it open accordingly to remove extensive purulence along with extensive sebaceous debris.  The area was probed with a Q-tip dipped in peroxide.  He tolerated this very well.  Dramatic improvement noted.  Significant resolution of symptoms noted.        Assessment/Plan   Infected left neck abscess status post incision and  drainage today.  This relates to a sebaceous cyst which became infected.  He does note a prior history of this in the past.  I suspect it may relate to ingrown hair phenomenon.  Nonetheless we have drained this and I will see him back for recheck in 3 weeks.  I did go ahead and put him on systemic antibiotics for now.  He will contact me sooner with issue.  All questions were answered in this regard accordingly.

## 2025-07-28 PROBLEM — L72.0 INCLUSION CYST: Status: ACTIVE | Noted: 2017-06-23

## 2025-07-28 NOTE — PROGRESS NOTES
Department of Gastroenterology & Hepatology  Initial Consult Note  Date of Service:  2025         Patient: Ramesh Julian    Medical Record: [id number]  Reason for Admission / Consultation: Colonoscopy consult  Gastroenterology Attending: Jimbo Barragan MD  Referring Provider: Sarah Miller MD No referring provider defined for this encounter.         My final recommendations will be communicated back to the requesting physician by way of shared medical record or letter via US mail         Impression: 54-year-old male with past medical history of smoking, family history of colon cancer in brother at 55 here for colonoscopy    No prior colonoscopy or stool based screening test  Schedule colonoscopy    Follow-up as needed    Jimbo Barragan MD  Gastroenterology, Hepatology and Nutrition  [facility]  =========================================================================  History of Present Illness:     Ramesh Julian  is a 54 y.o.   has a past medical history of Arthritis, Dental disease, Joint pain, OA (osteoarthritis), Smoker, and Smoker. who presents for colonoscopy.  Has never had a colonoscopy done before.  His brother  of rectal cancer at age 55.  Another brother also  of cancer but patient is not sure of the kind of cancer.  Denies any nausea, vomiting or abdominal pain.  Having 1-2 formed soft bowel movement every day.  Denies any blood in stool or weight loss.     Smokes 1/2 PPD  One daily drink   No THC       Pertinent Review of Systems:    Per HPI rest 12 point ROS negative      Past Medical History:    Medical History[1]     Past Surgical History:  Surgical History[2]     Family History:  Family History[3]     Social History:  Social History     Tobacco Use    Smoking status: Every Day     Current packs/day: 0.50     Types: Cigarettes    Smokeless tobacco: Never   Substance Use Topics    Alcohol use: Yes     Comment: social        Allergies:  RX Allergies[4]     "  Medications:  Medications Ordered Prior to Encounter[5]         Physical Exam:  BP (!) 144/91   Pulse 88   Temp 36.2 °C (97.1 °F) (Temporal)   Ht 1.651 m (5' 5\")   Wt 82.1 kg (181 lb)   SpO2 96%   BMI 30.12 kg/m²    General appearance: well appearing, alert, in no acute distress  Skin:  no rashes or lesions  Head: normal  Eyes: Anicteric sclera.   ENT: No oral erythema  Lungs: lungs clear to auscultation, no wheezing or rhonchi  Heart: RRR without murmur  Abdomen:  Abdomen soft, non-tender. Bowel sounds normal.   Extremities: Extremities normal.   Musculoskeletal: Muscular strength grossly intact  Neuro: CN2-12 grossly intact     Labs:  Current Medications[6]       SIGNATURE: Jimbo Barragan MD PATIENT NAME: Ramesh Julian   DATE: 2025 MRN: [id number]   TIME: 11:50 AM              [1]   Past Medical History:  Diagnosis Date    Arthritis     Dental disease     Joint pain     OA (osteoarthritis)     Smoker     Smoker    [2]   Past Surgical History:  Procedure Laterality Date    WISDOM TOOTH EXTRACTION     [3]   Family History  Problem Relation Name Age of Onset    Heart failure Mother      Cancer Sibling     [4] No Known Allergies  [5]   Current Outpatient Medications on File Prior to Visit   Medication Sig Dispense Refill    losartan (Cozaar) 25 mg tablet Take 1 tablet (25 mg) by mouth once daily. 90 tablet 1    naproxen (Naprosyn) 500 mg tablet Take 1 tablet by mouth twice daily 60 tablet 0    triamcinolone (Kenalog) 0.1 % cream Apply topically 2 times a day. Apply to affected area 1-2 times daily as needed. Avoid face and groin. 30 g 0    acetaminophen (Tylenol) 325 mg tablet Take 2 tablets (650 mg) by mouth every 6 hours if needed for mild pain (1 - 3). 42 tablet 0    amoxicillin (Amoxil) 500 mg capsule Take 4 tabs on hour prior to dental procedure 4 capsule 1    [] amoxicillin-clavulanate (Augmentin) 875-125 mg tablet Take 1 tablet (875 mg of amoxicillin) by mouth 2 times a day for 10 days. " 20 tablet 0    [] cephalexin (Keflex) 500 mg capsule Take 1 capsule (500 mg) by mouth 3 times a day for 10 days. 30 capsule 0    chlorhexidine (Hibiclens) 4 % external liquid Wash for 5 days prior to surgery, including day of surgery 473 mL 0    chlorhexidine (Peridex) 0.12 % solution Rinse mouth with 15 mL (1 cap full) for 30 seconds, AM and PM after toothbrushing. Expectorate after rinsing, do not swallow. Use the night before and morning of surgery. 30 mL 0    cyclobenzaprine (Flexeril) 10 mg tablet Take 1 tablet (10 mg) by mouth 3 times a day as needed for muscle spasms for up to 7 days. 21 tablet 0    docusate sodium (Colace) 100 mg capsule Take 1 capsule (100 mg) by mouth 2 times a day as needed for constipation. (Patient not taking: Reported on 2025) 60 capsule 0    ibuprofen (Motrin) capsule Take 2 capsules (400 mg) by mouth 4 times a day as needed (pain, alternate with Tylenol). (Patient not taking: Reported on 2025)      L. rhamnosus GG/inulin ([employer] ORAL) Take 1 tablet by mouth once daily. (Patient not taking: Reported on 2024)      methylPREDNISolone (Medrol Dospak) 4 mg tablets Follow schedule on package instructions (Patient not taking: Reported on 3/10/2025) 1 each 0    methylPREDNISolone (Medrol Dospak) 4 mg tablets Follow schedule on package instructions (Patient not taking: Reported on 2025) 1 each 0    multivitamin tablet Take 1 tablet by mouth once daily. (Patient not taking: Reported on 2025)       No current facility-administered medications on file prior to visit.   [6]   Current Outpatient Medications:     losartan (Cozaar) 25 mg tablet, Take 1 tablet (25 mg) by mouth once daily., Disp: 90 tablet, Rfl: 1    naproxen (Naprosyn) 500 mg tablet, Take 1 tablet by mouth twice daily, Disp: 60 tablet, Rfl: 0    triamcinolone (Kenalog) 0.1 % cream, Apply topically 2 times a day. Apply to affected area 1-2 times daily as needed. Avoid face and  groin., Disp: 30 g, Rfl: 0    acetaminophen (Tylenol) 325 mg tablet, Take 2 tablets (650 mg) by mouth every 6 hours if needed for mild pain (1 - 3)., Disp: 42 tablet, Rfl: 0    amoxicillin (Amoxil) 500 mg capsule, Take 4 tabs on hour prior to dental procedure, Disp: 4 capsule, Rfl: 1    chlorhexidine (Hibiclens) 4 % external liquid, Wash for 5 days prior to surgery, including day of surgery, Disp: 473 mL, Rfl: 0    chlorhexidine (Peridex) 0.12 % solution, Rinse mouth with 15 mL (1 cap full) for 30 seconds, AM and PM after toothbrushing. Expectorate after rinsing, do not swallow. Use the night before and morning of surgery., Disp: 30 mL, Rfl: 0    cyclobenzaprine (Flexeril) 10 mg tablet, Take 1 tablet (10 mg) by mouth 3 times a day as needed for muscle spasms for up to 7 days., Disp: 21 tablet, Rfl: 0    docusate sodium (Colace) 100 mg capsule, Take 1 capsule (100 mg) by mouth 2 times a day as needed for constipation. (Patient not taking: Reported on 7/30/2025), Disp: 60 capsule, Rfl: 0    ibuprofen (Motrin) capsule, Take 2 capsules (400 mg) by mouth 4 times a day as needed (pain, alternate with Tylenol). (Patient not taking: Reported on 7/30/2025), Disp: , Rfl:     L. rhamnosus GG/inulin ([facility] ORAL), Take 1 tablet by mouth once daily. (Patient not taking: Reported on 11/8/2024), Disp: , Rfl:     methylPREDNISolone (Medrol Dospak) 4 mg tablets, Follow schedule on package instructions (Patient not taking: Reported on 3/10/2025), Disp: 1 each, Rfl: 0    methylPREDNISolone (Medrol Dospak) 4 mg tablets, Follow schedule on package instructions (Patient not taking: Reported on 7/30/2025), Disp: 1 each, Rfl: 0    multivitamin tablet, Take 1 tablet by mouth once daily. (Patient not taking: Reported on 7/30/2025), Disp: , Rfl:

## 2025-07-30 ENCOUNTER — APPOINTMENT (OUTPATIENT)
Dept: GASTROENTEROLOGY | Facility: CLINIC | Age: 55
End: 2025-07-30
Payer: COMMERCIAL

## 2025-07-30 VITALS
TEMPERATURE: 97.1 F | HEART RATE: 88 BPM | WEIGHT: 181 LBS | DIASTOLIC BLOOD PRESSURE: 91 MMHG | SYSTOLIC BLOOD PRESSURE: 144 MMHG | OXYGEN SATURATION: 96 % | BODY MASS INDEX: 30.16 KG/M2 | HEIGHT: 65 IN

## 2025-07-30 DIAGNOSIS — Z12.11 COLON CANCER SCREENING: ICD-10-CM

## 2025-07-30 DIAGNOSIS — Z12.11 COLON CANCER SCREENING: Primary | ICD-10-CM

## 2025-07-30 DIAGNOSIS — Z80.0 FAMILY HISTORY OF MALIGNANT NEOPLASM OF COLON IN FIRST DEGREE RELATIVE DIAGNOSED WHEN YOUNGER THAN 60 YEARS OF AGE: Primary | ICD-10-CM

## 2025-07-30 PROCEDURE — 3008F BODY MASS INDEX DOCD: CPT | Performed by: INTERNAL MEDICINE

## 2025-07-30 PROCEDURE — 99203 OFFICE O/P NEW LOW 30 MIN: CPT | Performed by: INTERNAL MEDICINE

## 2025-07-30 RX ORDER — SODIUM, POTASSIUM,MAG SULFATES 17.5-3.13G
SOLUTION, RECONSTITUTED, ORAL ORAL
Qty: 354 ML | Refills: 0 | Status: SHIPPED | OUTPATIENT
Start: 2025-07-30

## 2025-08-04 DIAGNOSIS — M25.552 LEFT HIP PAIN: ICD-10-CM

## 2025-08-04 RX ORDER — NAPROXEN 500 MG/1
500 TABLET ORAL 2 TIMES DAILY
Qty: 60 TABLET | Refills: 0 | Status: SHIPPED | OUTPATIENT
Start: 2025-08-04

## 2025-08-06 ENCOUNTER — APPOINTMENT (OUTPATIENT)
Dept: OTOLARYNGOLOGY | Facility: CLINIC | Age: 55
End: 2025-08-06
Payer: COMMERCIAL

## 2025-08-06 DIAGNOSIS — L02.11 NECK ABSCESS: Primary | ICD-10-CM

## 2025-08-06 PROCEDURE — 99214 OFFICE O/P EST MOD 30 MIN: CPT | Performed by: OTOLARYNGOLOGY

## 2025-08-06 RX ORDER — CEPHALEXIN 500 MG/1
500 CAPSULE ORAL 3 TIMES DAILY
Qty: 30 CAPSULE | Refills: 0 | Status: SHIPPED | OUTPATIENT
Start: 2025-08-06 | End: 2025-08-16

## 2025-08-06 NOTE — PROGRESS NOTES
Subjective   Patient ID: Ramesh Julian is a 54 y.o. male who presents for FU ON NECK ABSCESS.    HPI  The patient returns, being seen for 3-week follow-up on left neck abscess related to sebaceous cyst that became infected. He is doing well since last visit where he underwent incision and drainage. Wife who is a nurse has been helping him to clean the area twice per day with saline solution. He does notice some occasional drainage from the area and a firmness beneath the skin. Denies acute pain.     All remaining head neck inquiry otherwise negative.     Physical Exam  Neck:  The neck is negative for mass lymphadenopathy. The trachea and parotid are clear. The thyroid bed is grossly unremarkable. The salivary gland structures are grossly unremarkable. Area or previous I&D does not appear to be infected at this time, though there is evidence of cyst structure remaining under skin.    Assessment/Plan   54yr old male presents for follow-up on left neck abscess. I&D performed at last visit. Today neck looks better but under microscope still noted for some evidence of skin particles trapped beneath the skin which I am concerned will smolder into another infection in only a matter of time. At this time I would recommend taking to surgery for removal of cyst structure. Detailed discussion with patient today regarding that procedure, including risks, benefits, and alternatives. He understands and agrees to proceed and we will get him scheduled accordingly. Will also place referral to Dermatology for him.

## 2025-08-19 ENCOUNTER — APPOINTMENT (OUTPATIENT)
Dept: PRIMARY CARE | Facility: CLINIC | Age: 55
End: 2025-08-19
Payer: COMMERCIAL

## 2025-08-19 ENCOUNTER — ANESTHESIA EVENT (OUTPATIENT)
Dept: GASTROENTEROLOGY | Facility: EXTERNAL LOCATION | Age: 55
End: 2025-08-19

## 2025-08-22 ENCOUNTER — HOSPITAL ENCOUNTER (OUTPATIENT)
Dept: PREADMISSION TESTING | Age: 55
Discharge: HOME | End: 2025-08-26
Payer: COMMERCIAL

## 2025-08-22 VITALS
BODY MASS INDEX: 29.12 KG/M2 | HEART RATE: 79 BPM | TEMPERATURE: 98.6 F | HEIGHT: 66 IN | RESPIRATION RATE: 16 BRPM | WEIGHT: 181.2 LBS | OXYGEN SATURATION: 99 % | SYSTOLIC BLOOD PRESSURE: 148 MMHG | DIASTOLIC BLOOD PRESSURE: 88 MMHG

## 2025-08-22 DIAGNOSIS — I10 HYPERTENSION, UNSPECIFIED TYPE: Primary | ICD-10-CM

## 2025-08-22 PROBLEM — R14.0 ABDOMINAL BLOATING: Status: ACTIVE | Noted: 2023-10-31

## 2025-08-22 PROBLEM — M16.11 UNILATERAL PRIMARY OSTEOARTHRITIS, RIGHT HIP: Status: ACTIVE | Noted: 2024-03-28

## 2025-08-22 PROBLEM — Z96.641 S/P TOTAL RIGHT HIP ARTHROPLASTY: Status: ACTIVE | Noted: 2024-03-28

## 2025-08-22 PROBLEM — L72.0 INCLUSION CYST: Status: ACTIVE | Noted: 2017-06-23

## 2025-08-22 PROBLEM — F17.200 SMOKER: Status: ACTIVE | Noted: 2024-03-12

## 2025-08-22 PROBLEM — M16.12 PRIMARY OSTEOARTHRITIS OF LEFT HIP: Status: ACTIVE | Noted: 2024-12-04

## 2025-08-22 PROBLEM — L02.11 NECK ABSCESS: Status: ACTIVE | Noted: 2025-07-16

## 2025-08-22 PROBLEM — M16.10 HIP ARTHRITIS: Status: ACTIVE | Noted: 2024-11-08

## 2025-08-22 PROBLEM — E78.1 HYPERTRIGLYCERIDEMIA: Status: ACTIVE | Noted: 2024-11-08

## 2025-08-22 PROBLEM — N52.9 ED (ERECTILE DYSFUNCTION) OF ORGANIC ORIGIN: Status: ACTIVE | Noted: 2023-10-31

## 2025-08-22 LAB
ABO/RH: NORMAL
ALBUMIN SERPL-MCNC: 4.4 G/DL (ref 3.5–4.6)
ALP SERPL-CCNC: 81 U/L (ref 35–104)
ALT SERPL-CCNC: 24 U/L (ref 0–41)
ANION GAP SERPL CALCULATED.3IONS-SCNC: 15 MEQ/L (ref 9–15)
ANTIBODY SCREEN: NORMAL
AST SERPL-CCNC: 28 U/L (ref 0–40)
BASOPHILS # BLD: 0.1 K/UL (ref 0–0.2)
BASOPHILS NFR BLD: 1.6 %
BILIRUB SERPL-MCNC: 0.5 MG/DL (ref 0.2–0.7)
BUN SERPL-MCNC: 16 MG/DL (ref 6–20)
CALCIUM SERPL-MCNC: 9.5 MG/DL (ref 8.5–9.9)
CHLORIDE SERPL-SCNC: 105 MEQ/L (ref 95–107)
CO2 SERPL-SCNC: 21 MEQ/L (ref 20–31)
CREAT SERPL-MCNC: 0.88 MG/DL (ref 0.7–1.2)
EOSINOPHIL # BLD: 0.4 K/UL (ref 0–0.7)
EOSINOPHIL NFR BLD: 7.7 %
ERYTHROCYTE [DISTWIDTH] IN BLOOD BY AUTOMATED COUNT: 13.1 % (ref 11.5–14.5)
GLOBULIN SER CALC-MCNC: 3.4 G/DL (ref 2.3–3.5)
GLUCOSE SERPL-MCNC: 82 MG/DL (ref 70–99)
HCT VFR BLD AUTO: 41.9 % (ref 42–52)
HGB BLD-MCNC: 15.1 G/DL (ref 14–18)
LYMPHOCYTES # BLD: 1.5 K/UL (ref 1–4.8)
LYMPHOCYTES NFR BLD: 29.1 %
MCH RBC QN AUTO: 35.9 PG (ref 27–31.3)
MCHC RBC AUTO-ENTMCNC: 36 % (ref 33–37)
MCV RBC AUTO: 99.5 FL (ref 79–92.2)
MONOCYTES # BLD: 0.6 K/UL (ref 0.2–0.8)
MONOCYTES NFR BLD: 12.5 %
NEUTROPHILS # BLD: 2.5 K/UL (ref 1.4–6.5)
NEUTS SEG NFR BLD: 48.9 %
PLATELET # BLD AUTO: 226 K/UL (ref 130–400)
POTASSIUM SERPL-SCNC: 4.1 MEQ/L (ref 3.4–4.9)
PROT SERPL-MCNC: 7.8 G/DL (ref 6.3–8)
RBC # BLD AUTO: 4.21 M/UL (ref 4.7–6.1)
SODIUM SERPL-SCNC: 141 MEQ/L (ref 135–144)
WBC # BLD AUTO: 5.1 K/UL (ref 4.8–10.8)

## 2025-08-22 PROCEDURE — 80053 COMPREHEN METABOLIC PANEL: CPT

## 2025-08-22 PROCEDURE — 86901 BLOOD TYPING SEROLOGIC RH(D): CPT

## 2025-08-22 PROCEDURE — 86900 BLOOD TYPING SEROLOGIC ABO: CPT

## 2025-08-22 PROCEDURE — 86850 RBC ANTIBODY SCREEN: CPT

## 2025-08-22 PROCEDURE — 85025 COMPLETE CBC W/AUTO DIFF WBC: CPT

## 2025-08-22 RX ORDER — SODIUM CHLORIDE 9 MG/ML
INJECTION, SOLUTION INTRAVENOUS PRN
Status: CANCELLED | OUTPATIENT
Start: 2025-08-26

## 2025-08-22 RX ORDER — LIDOCAINE HYDROCHLORIDE 10 MG/ML
1 INJECTION, SOLUTION EPIDURAL; INFILTRATION; INTRACAUDAL; PERINEURAL
Status: CANCELLED | OUTPATIENT
Start: 2025-08-26

## 2025-08-22 RX ORDER — SODIUM CHLORIDE 0.9 % (FLUSH) 0.9 %
5-40 SYRINGE (ML) INJECTION PRN
Status: CANCELLED | OUTPATIENT
Start: 2025-08-26

## 2025-08-22 RX ORDER — ACETAMINOPHEN 325 MG/1
650 TABLET ORAL EVERY 6 HOURS PRN
COMMUNITY
Start: 2024-11-11

## 2025-08-22 RX ORDER — NAPROXEN 500 MG/1
500 TABLET ORAL 2 TIMES DAILY
COMMUNITY
Start: 2025-08-04

## 2025-08-22 RX ORDER — SODIUM CHLORIDE 0.9 % (FLUSH) 0.9 %
5-40 SYRINGE (ML) INJECTION EVERY 12 HOURS SCHEDULED
Status: CANCELLED | OUTPATIENT
Start: 2025-08-26

## 2025-08-22 RX ORDER — LOSARTAN POTASSIUM 25 MG/1
25 TABLET ORAL DAILY
COMMUNITY
Start: 2025-03-10 | End: 2026-04-14

## 2025-08-25 ENCOUNTER — ANESTHESIA EVENT (OUTPATIENT)
Dept: OPERATING ROOM | Age: 55
End: 2025-08-25
Payer: COMMERCIAL

## 2025-08-26 ENCOUNTER — ANESTHESIA (OUTPATIENT)
Dept: OPERATING ROOM | Age: 55
End: 2025-08-26
Payer: COMMERCIAL

## 2025-08-26 ENCOUNTER — HOSPITAL ENCOUNTER (OUTPATIENT)
Age: 55
Setting detail: OUTPATIENT SURGERY
Discharge: HOME | End: 2025-08-26
Attending: OTOLARYNGOLOGY | Admitting: OTOLARYNGOLOGY
Payer: COMMERCIAL

## 2025-08-26 VITALS
BODY MASS INDEX: 29.12 KG/M2 | SYSTOLIC BLOOD PRESSURE: 168 MMHG | WEIGHT: 181.22 LBS | DIASTOLIC BLOOD PRESSURE: 92 MMHG | RESPIRATION RATE: 14 BRPM | OXYGEN SATURATION: 100 % | HEART RATE: 72 BPM | HEIGHT: 66 IN | TEMPERATURE: 97.3 F

## 2025-08-26 DIAGNOSIS — L02.11 NECK ABSCESS: ICD-10-CM

## 2025-08-26 PROCEDURE — 6360000002 HC RX W HCPCS

## 2025-08-26 PROCEDURE — 2500000003 HC RX 250 WO HCPCS: Performed by: OTOLARYNGOLOGY

## 2025-08-26 PROCEDURE — 6360000002 HC RX W HCPCS: Performed by: OTOLARYNGOLOGY

## 2025-08-26 PROCEDURE — 3700000001 HC ADD 15 MINUTES (ANESTHESIA): Performed by: OTOLARYNGOLOGY

## 2025-08-26 PROCEDURE — 3600000002 HC SURGERY LEVEL 2 BASE: Performed by: OTOLARYNGOLOGY

## 2025-08-26 PROCEDURE — 3700000000 HC ANESTHESIA ATTENDED CARE: Performed by: OTOLARYNGOLOGY

## 2025-08-26 PROCEDURE — 7100000010 HC PHASE II RECOVERY - FIRST 15 MIN: Performed by: OTOLARYNGOLOGY

## 2025-08-26 PROCEDURE — 7100000001 HC PACU RECOVERY - ADDTL 15 MIN: Performed by: OTOLARYNGOLOGY

## 2025-08-26 PROCEDURE — 3600000012 HC SURGERY LEVEL 2 ADDTL 15MIN: Performed by: OTOLARYNGOLOGY

## 2025-08-26 PROCEDURE — 7100000000 HC PACU RECOVERY - FIRST 15 MIN: Performed by: OTOLARYNGOLOGY

## 2025-08-26 PROCEDURE — 88304 TISSUE EXAM BY PATHOLOGIST: CPT

## 2025-08-26 PROCEDURE — 7100000011 HC PHASE II RECOVERY - ADDTL 15 MIN: Performed by: OTOLARYNGOLOGY

## 2025-08-26 PROCEDURE — 2709999900 HC NON-CHARGEABLE SUPPLY: Performed by: OTOLARYNGOLOGY

## 2025-08-26 PROCEDURE — 2580000003 HC RX 258

## 2025-08-26 PROCEDURE — 2500000003 HC RX 250 WO HCPCS

## 2025-08-26 RX ORDER — SODIUM CHLORIDE 0.9 % (FLUSH) 0.9 %
5-40 SYRINGE (ML) INJECTION EVERY 12 HOURS SCHEDULED
Status: DISCONTINUED | OUTPATIENT
Start: 2025-08-26 | End: 2025-08-26 | Stop reason: HOSPADM

## 2025-08-26 RX ORDER — FENTANYL CITRATE 50 UG/ML
INJECTION, SOLUTION INTRAMUSCULAR; INTRAVENOUS
Status: DISCONTINUED | OUTPATIENT
Start: 2025-08-26 | End: 2025-08-26 | Stop reason: SDUPTHER

## 2025-08-26 RX ORDER — LIDOCAINE HYDROCHLORIDE 10 MG/ML
INJECTION, SOLUTION EPIDURAL; INFILTRATION; INTRACAUDAL; PERINEURAL
Status: DISCONTINUED | OUTPATIENT
Start: 2025-08-26 | End: 2025-08-26 | Stop reason: SDUPTHER

## 2025-08-26 RX ORDER — SODIUM CHLORIDE 9 MG/ML
INJECTION, SOLUTION INTRAVENOUS PRN
Status: DISCONTINUED | OUTPATIENT
Start: 2025-08-26 | End: 2025-08-26 | Stop reason: HOSPADM

## 2025-08-26 RX ORDER — OXYCODONE HYDROCHLORIDE 5 MG/1
5 TABLET ORAL
Status: DISCONTINUED | OUTPATIENT
Start: 2025-08-26 | End: 2025-08-26 | Stop reason: HOSPADM

## 2025-08-26 RX ORDER — PROPOFOL 10 MG/ML
INJECTION, EMULSION INTRAVENOUS
Status: DISCONTINUED | OUTPATIENT
Start: 2025-08-26 | End: 2025-08-26 | Stop reason: SDUPTHER

## 2025-08-26 RX ORDER — MEPERIDINE HYDROCHLORIDE 25 MG/ML
12.5 INJECTION INTRAMUSCULAR; INTRAVENOUS; SUBCUTANEOUS
Status: DISCONTINUED | OUTPATIENT
Start: 2025-08-26 | End: 2025-08-26 | Stop reason: HOSPADM

## 2025-08-26 RX ORDER — ROCURONIUM BROMIDE 10 MG/ML
INJECTION, SOLUTION INTRAVENOUS
Status: DISCONTINUED | OUTPATIENT
Start: 2025-08-26 | End: 2025-08-26 | Stop reason: SDUPTHER

## 2025-08-26 RX ORDER — MIDAZOLAM HYDROCHLORIDE 1 MG/ML
INJECTION, SOLUTION INTRAMUSCULAR; INTRAVENOUS
Status: DISCONTINUED | OUTPATIENT
Start: 2025-08-26 | End: 2025-08-26 | Stop reason: SDUPTHER

## 2025-08-26 RX ORDER — SODIUM CHLORIDE 0.9 % (FLUSH) 0.9 %
5-40 SYRINGE (ML) INJECTION PRN
Status: DISCONTINUED | OUTPATIENT
Start: 2025-08-26 | End: 2025-08-26 | Stop reason: HOSPADM

## 2025-08-26 RX ORDER — LIDOCAINE HYDROCHLORIDE AND EPINEPHRINE 10; 10 MG/ML; UG/ML
INJECTION, SOLUTION INFILTRATION; PERINEURAL PRN
Status: DISCONTINUED | OUTPATIENT
Start: 2025-08-26 | End: 2025-08-26 | Stop reason: HOSPADM

## 2025-08-26 RX ORDER — DEXAMETHASONE SODIUM PHOSPHATE 10 MG/ML
INJECTION, SOLUTION INTRA-ARTICULAR; INTRALESIONAL; INTRAMUSCULAR; INTRAVENOUS; SOFT TISSUE
Status: DISCONTINUED | OUTPATIENT
Start: 2025-08-26 | End: 2025-08-26 | Stop reason: SDUPTHER

## 2025-08-26 RX ORDER — ONDANSETRON 2 MG/ML
INJECTION INTRAMUSCULAR; INTRAVENOUS
Status: DISCONTINUED | OUTPATIENT
Start: 2025-08-26 | End: 2025-08-26 | Stop reason: SDUPTHER

## 2025-08-26 RX ORDER — FENTANYL CITRATE 0.05 MG/ML
50 INJECTION, SOLUTION INTRAMUSCULAR; INTRAVENOUS EVERY 10 MIN PRN
Status: DISCONTINUED | OUTPATIENT
Start: 2025-08-26 | End: 2025-08-26 | Stop reason: HOSPADM

## 2025-08-26 RX ORDER — MAGNESIUM HYDROXIDE 1200 MG/15ML
LIQUID ORAL CONTINUOUS PRN
Status: DISCONTINUED | OUTPATIENT
Start: 2025-08-26 | End: 2025-08-26 | Stop reason: HOSPADM

## 2025-08-26 RX ORDER — METOCLOPRAMIDE HYDROCHLORIDE 5 MG/ML
10 INJECTION INTRAMUSCULAR; INTRAVENOUS
Status: DISCONTINUED | OUTPATIENT
Start: 2025-08-26 | End: 2025-08-26 | Stop reason: HOSPADM

## 2025-08-26 RX ORDER — LIDOCAINE HYDROCHLORIDE 10 MG/ML
1 INJECTION, SOLUTION EPIDURAL; INFILTRATION; INTRACAUDAL; PERINEURAL
Status: DISCONTINUED | OUTPATIENT
Start: 2025-08-26 | End: 2025-08-26 | Stop reason: HOSPADM

## 2025-08-26 RX ORDER — ONDANSETRON 2 MG/ML
4 INJECTION INTRAMUSCULAR; INTRAVENOUS
Status: DISCONTINUED | OUTPATIENT
Start: 2025-08-26 | End: 2025-08-26 | Stop reason: HOSPADM

## 2025-08-26 RX ORDER — DIPHENHYDRAMINE HYDROCHLORIDE 50 MG/ML
12.5 INJECTION, SOLUTION INTRAMUSCULAR; INTRAVENOUS
Status: DISCONTINUED | OUTPATIENT
Start: 2025-08-26 | End: 2025-08-26 | Stop reason: HOSPADM

## 2025-08-26 RX ORDER — WOUND DRESSING ADHESIVE - LIQUID
LIQUID MISCELLANEOUS PRN
Status: DISCONTINUED | OUTPATIENT
Start: 2025-08-26 | End: 2025-08-26 | Stop reason: HOSPADM

## 2025-08-26 RX ADMIN — SODIUM CHLORIDE: 0.9 INJECTION, SOLUTION INTRAVENOUS at 07:53

## 2025-08-26 RX ADMIN — CEFAZOLIN 2000 MG: 2 INJECTION, POWDER, FOR SOLUTION INTRAMUSCULAR; INTRAVENOUS at 10:48

## 2025-08-26 RX ADMIN — ROCURONIUM BROMIDE 40 MG: 10 INJECTION, SOLUTION INTRAVENOUS at 10:43

## 2025-08-26 RX ADMIN — ONDANSETRON 4 MG: 2 INJECTION, SOLUTION INTRAMUSCULAR; INTRAVENOUS at 10:48

## 2025-08-26 RX ADMIN — FENTANYL CITRATE 100 MCG: 50 INJECTION, SOLUTION INTRAMUSCULAR; INTRAVENOUS at 10:43

## 2025-08-26 RX ADMIN — LIDOCAINE HYDROCHLORIDE 25 MG: 10 INJECTION, SOLUTION EPIDURAL; INFILTRATION; INTRACAUDAL; PERINEURAL at 10:43

## 2025-08-26 RX ADMIN — PROPOFOL 150 MG: 10 INJECTION, EMULSION INTRAVENOUS at 10:43

## 2025-08-26 RX ADMIN — MIDAZOLAM HYDROCHLORIDE 2 MG: 1 INJECTION, SOLUTION INTRAMUSCULAR; INTRAVENOUS at 10:36

## 2025-08-26 RX ADMIN — SODIUM CHLORIDE: 0.9 INJECTION, SOLUTION INTRAVENOUS at 10:53

## 2025-08-26 RX ADMIN — DEXAMETHASONE SODIUM PHOSPHATE 10 MG: 10 INJECTION INTRAMUSCULAR; INTRAVENOUS at 10:48

## 2025-08-26 ASSESSMENT — PAIN SCALES - GENERAL
PAINLEVEL_OUTOF10: 0

## 2025-08-26 ASSESSMENT — PAIN - FUNCTIONAL ASSESSMENT: PAIN_FUNCTIONAL_ASSESSMENT: 0-10

## 2025-08-29 ENCOUNTER — ANESTHESIA (OUTPATIENT)
Dept: GASTROENTEROLOGY | Facility: EXTERNAL LOCATION | Age: 55
End: 2025-08-29

## 2025-08-29 ENCOUNTER — APPOINTMENT (OUTPATIENT)
Dept: GASTROENTEROLOGY | Facility: EXTERNAL LOCATION | Age: 55
End: 2025-08-29
Payer: COMMERCIAL

## 2025-08-29 VITALS
HEART RATE: 55 BPM | DIASTOLIC BLOOD PRESSURE: 97 MMHG | WEIGHT: 180 LBS | SYSTOLIC BLOOD PRESSURE: 192 MMHG | TEMPERATURE: 97.2 F | OXYGEN SATURATION: 100 % | BODY MASS INDEX: 28.93 KG/M2 | RESPIRATION RATE: 20 BRPM | HEIGHT: 66 IN

## 2025-08-29 DIAGNOSIS — Z12.11 COLON CANCER SCREENING: ICD-10-CM

## 2025-08-29 PROCEDURE — 45385 COLONOSCOPY W/LESION REMOVAL: CPT | Performed by: INTERNAL MEDICINE

## 2025-08-29 RX ORDER — LIDOCAINE HYDROCHLORIDE 20 MG/ML
INJECTION, SOLUTION INFILTRATION; PERINEURAL AS NEEDED
Status: DISCONTINUED | OUTPATIENT
Start: 2025-08-29 | End: 2025-08-29

## 2025-08-29 RX ORDER — PROPOFOL 10 MG/ML
INJECTION, EMULSION INTRAVENOUS AS NEEDED
Status: DISCONTINUED | OUTPATIENT
Start: 2025-08-29 | End: 2025-08-29

## 2025-08-29 RX ORDER — SODIUM CHLORIDE 9 MG/ML
INJECTION, SOLUTION INTRAVENOUS CONTINUOUS PRN
Status: DISCONTINUED | OUTPATIENT
Start: 2025-08-29 | End: 2025-08-29

## 2025-08-29 RX ADMIN — PROPOFOL 50 MG: 10 INJECTION, EMULSION INTRAVENOUS at 07:50

## 2025-08-29 RX ADMIN — PROPOFOL 50 MG: 10 INJECTION, EMULSION INTRAVENOUS at 07:55

## 2025-08-29 RX ADMIN — SODIUM CHLORIDE: 9 INJECTION, SOLUTION INTRAVENOUS at 07:42

## 2025-08-29 RX ADMIN — LIDOCAINE HYDROCHLORIDE 50 MG: 20 INJECTION, SOLUTION INFILTRATION; PERINEURAL at 07:44

## 2025-08-29 RX ADMIN — PROPOFOL 50 MG: 10 INJECTION, EMULSION INTRAVENOUS at 07:52

## 2025-08-29 RX ADMIN — PROPOFOL 50 MG: 10 INJECTION, EMULSION INTRAVENOUS at 07:47

## 2025-08-29 RX ADMIN — PROPOFOL 100 MG: 10 INJECTION, EMULSION INTRAVENOUS at 07:44

## 2025-08-29 SDOH — HEALTH STABILITY: MENTAL HEALTH: CURRENT SMOKER: 1

## 2025-08-29 ASSESSMENT — PAIN SCALES - GENERAL
PAINLEVEL_OUTOF10: 0 - NO PAIN
PAIN_LEVEL: 0

## 2025-08-29 ASSESSMENT — PAIN - FUNCTIONAL ASSESSMENT
PAIN_FUNCTIONAL_ASSESSMENT: 0-10

## 2025-08-31 DIAGNOSIS — I10 PRIMARY HYPERTENSION: ICD-10-CM

## 2025-09-02 RX ORDER — LOSARTAN POTASSIUM 25 MG/1
25 TABLET ORAL DAILY
Qty: 90 TABLET | Refills: 0 | Status: SHIPPED | OUTPATIENT
Start: 2025-09-02

## 2025-09-05 DIAGNOSIS — M25.552 LEFT HIP PAIN: ICD-10-CM

## 2025-09-05 RX ORDER — NAPROXEN 500 MG/1
500 TABLET ORAL 2 TIMES DAILY
Qty: 60 TABLET | Refills: 0 | Status: SHIPPED | OUTPATIENT
Start: 2025-09-05

## 2025-09-10 ENCOUNTER — APPOINTMENT (OUTPATIENT)
Dept: OTOLARYNGOLOGY | Facility: CLINIC | Age: 55
End: 2025-09-10
Payer: COMMERCIAL

## 2025-12-29 ENCOUNTER — APPOINTMENT (OUTPATIENT)
Dept: DERMATOLOGY | Facility: CLINIC | Age: 55
End: 2025-12-29
Payer: COMMERCIAL

## (undated) DEVICE — BLADE, GEN COATED 2.75, LF

## (undated) DEVICE — SUTURE VICRYL SZ 4-0 L18IN ABSRB UD L19MM PS-2 3/8 CIR PRIM J496H

## (undated) DEVICE — PENCIL SMK EVAC BLADE COAT 70 MM BUTTON SWITCH NEPTUNE E-SEP

## (undated) DEVICE — SOLUTION, IRRIGATION, STERILE WATER, 1000 ML, POUR BOTTLE

## (undated) DEVICE — SOLUTION, IRRIGATION, SODIUM CHLORIDE 0.9%, 1000 ML, POUR BOTTLE

## (undated) DEVICE — SUTURE, MONOCRYL, 4-0, 27 IN, PS-2, UNDYED

## (undated) DEVICE — DRESSING, AQUACEL AG, HYDROFIBER W/SILVER, 3.5 X 12 IN

## (undated) DEVICE — HIGH FLOW TIP FOR INTERPULSE HANDPIECE SET

## (undated) DEVICE — COVER HANDLE LIGHT, STERIS, BLUE, STERILE

## (undated) DEVICE — FEMORAL CANAL TIP FOR INTERPULSE HANDPIECE SET

## (undated) DEVICE — TOWEL PACK, STERILE, 4/PACK, BLUE

## (undated) DEVICE — GLOVE ORANGE PI 7 1/2   MSG9075

## (undated) DEVICE — PIN, BONE, 4MM X 140MM, STERILE

## (undated) DEVICE — WOUND SYSTEM, DEBRIDEMENT & CLEANING, O.R DUOPAK

## (undated) DEVICE — Device

## (undated) DEVICE — SYRINGE, 60 CC, LUER LOCK, MONOJECT, W/O CAP, LF

## (undated) DEVICE — CHECKPOINT, 3.5 HEX

## (undated) DEVICE — COVER, PLASTIC, MAYO STAND, 29.5IN X 55.5IN

## (undated) DEVICE — BLADE, SAW, SAGITTAL, 21 X 84.5 X 0.89 MM, STAINLESS STEEL, STERILE

## (undated) DEVICE — SUTURE, STRATAFIX, 1 SYMMETRIC, PDS PLUS, 60CM, CTX, VIOLET

## (undated) DEVICE — GOWN, SURGICAL, NON-REINFORCED, XLARGE, LF

## (undated) DEVICE — BLADE ES ELASTOMERIC COAT INSUL DURABLE BEND UPTO 90DEG

## (undated) DEVICE — SUTURE, ETHIBOND XTRA, 5 CCS, GRN/BR, LF

## (undated) DEVICE — SUTURE, VICRYL, 1, 36 IN, CT-1, UNDYED

## (undated) DEVICE — STRIP SKIN CLSR W0.5XL4IN WHT SPUNBOUND FBR NYL STERIL HI ADH

## (undated) DEVICE — APPLICATOR, CHLORAPREP, W/ORANGE TINT, 26ML

## (undated) DEVICE — ELECTRODE, ELECTROSURGICAL, BLADE, EXTENDED

## (undated) DEVICE — DRAPE KIT, RIO

## (undated) DEVICE — NEEDLE, SPINAL, QUINCKE, 18 G X 3.5 IN, PINK HUB

## (undated) DEVICE — TRACKING KIT, HIP PROCEDURES, VIZADISC

## (undated) DEVICE — TISSUE ADHESIVE, PREMIERPRO EXOFIN, PRECISION PEN HV, 1.0ML

## (undated) DEVICE — SUTURE VICRYL + SZ 3-0 L27IN ABSRB UD L26MM SH 1/2 CIR VCP416H

## (undated) DEVICE — DRAPE, SHEET, U, STERI DRAPE, 47 X 51 IN, DISPOSABLE, STERILE

## (undated) DEVICE — GLOVE, SURGICAL, PROTEXIS PI , 7.5, PF, LF

## (undated) DEVICE — INTERPULSE HANDPIECE SET W/ 10FT SUCTION TUBING

## (undated) DEVICE — PILLOW, ABDUCTION, MEDIUM

## (undated) DEVICE — HOOD, STERISHIELD T4 SYSTEM

## (undated) DEVICE — MANIFOLD SUCT SMK EVAC SGL PRT DISP NEPTUNE 2

## (undated) DEVICE — SUTURE, VICRYL, 2-0, 36 IN, CT-1, UNDYED